# Patient Record
Sex: FEMALE | Race: WHITE | NOT HISPANIC OR LATINO | URBAN - METROPOLITAN AREA
[De-identification: names, ages, dates, MRNs, and addresses within clinical notes are randomized per-mention and may not be internally consistent; named-entity substitution may affect disease eponyms.]

---

## 2020-12-05 ENCOUNTER — INPATIENT (INPATIENT)
Facility: HOSPITAL | Age: 85
LOS: 5 days | Discharge: NURSING, REHAB OR LONG-TERM CARE | End: 2020-12-11
Attending: INTERNAL MEDICINE | Admitting: INTERNAL MEDICINE
Payer: MEDICARE

## 2020-12-05 VITALS
HEART RATE: 99 BPM | HEIGHT: 67 IN | SYSTOLIC BLOOD PRESSURE: 182 MMHG | WEIGHT: 199.96 LBS | OXYGEN SATURATION: 95 % | TEMPERATURE: 98 F | DIASTOLIC BLOOD PRESSURE: 129 MMHG | RESPIRATION RATE: 17 BRPM

## 2020-12-05 LAB
ALBUMIN SERPL ELPH-MCNC: 3.4 G/DL — SIGNIFICANT CHANGE UP (ref 3.3–5)
ALP SERPL-CCNC: 105 U/L — SIGNIFICANT CHANGE UP (ref 40–120)
ALT FLD-CCNC: 33 U/L — SIGNIFICANT CHANGE UP (ref 12–78)
ANION GAP SERPL CALC-SCNC: 8 MMOL/L — SIGNIFICANT CHANGE UP (ref 5–17)
APTT BLD: 39.6 SEC — HIGH (ref 27.5–35.5)
AST SERPL-CCNC: 54 U/L — HIGH (ref 15–37)
BASOPHILS # BLD AUTO: 0.03 K/UL — SIGNIFICANT CHANGE UP (ref 0–0.2)
BASOPHILS NFR BLD AUTO: 0.4 % — SIGNIFICANT CHANGE UP (ref 0–2)
BILIRUB SERPL-MCNC: 1.3 MG/DL — HIGH (ref 0.2–1.2)
BUN SERPL-MCNC: 16 MG/DL — SIGNIFICANT CHANGE UP (ref 7–23)
CALCIUM SERPL-MCNC: 10 MG/DL — SIGNIFICANT CHANGE UP (ref 8.5–10.1)
CHLORIDE SERPL-SCNC: 105 MMOL/L — SIGNIFICANT CHANGE UP (ref 96–108)
CK SERPL-CCNC: 125 U/L — SIGNIFICANT CHANGE UP (ref 26–192)
CO2 SERPL-SCNC: 24 MMOL/L — SIGNIFICANT CHANGE UP (ref 22–31)
CREAT SERPL-MCNC: 1.16 MG/DL — SIGNIFICANT CHANGE UP (ref 0.5–1.3)
EOSINOPHIL # BLD AUTO: 0.04 K/UL — SIGNIFICANT CHANGE UP (ref 0–0.5)
EOSINOPHIL NFR BLD AUTO: 0.5 % — SIGNIFICANT CHANGE UP (ref 0–6)
GLUCOSE SERPL-MCNC: 115 MG/DL — HIGH (ref 70–99)
HCT VFR BLD CALC: 52.6 % — HIGH (ref 34.5–45)
HGB BLD-MCNC: 17.8 G/DL — HIGH (ref 11.5–15.5)
IMM GRANULOCYTES NFR BLD AUTO: 0.4 % — SIGNIFICANT CHANGE UP (ref 0–1.5)
INR BLD: 1.75 RATIO — HIGH (ref 0.88–1.16)
LYMPHOCYTES # BLD AUTO: 1.33 K/UL — SIGNIFICANT CHANGE UP (ref 1–3.3)
LYMPHOCYTES # BLD AUTO: 17.7 % — SIGNIFICANT CHANGE UP (ref 13–44)
MCHC RBC-ENTMCNC: 30.6 PG — SIGNIFICANT CHANGE UP (ref 27–34)
MCHC RBC-ENTMCNC: 33.8 GM/DL — SIGNIFICANT CHANGE UP (ref 32–36)
MCV RBC AUTO: 90.4 FL — SIGNIFICANT CHANGE UP (ref 80–100)
MONOCYTES # BLD AUTO: 0.69 K/UL — SIGNIFICANT CHANGE UP (ref 0–0.9)
MONOCYTES NFR BLD AUTO: 9.2 % — SIGNIFICANT CHANGE UP (ref 2–14)
NEUTROPHILS # BLD AUTO: 5.38 K/UL — SIGNIFICANT CHANGE UP (ref 1.8–7.4)
NEUTROPHILS NFR BLD AUTO: 71.8 % — SIGNIFICANT CHANGE UP (ref 43–77)
NRBC # BLD: 0 /100 WBCS — SIGNIFICANT CHANGE UP (ref 0–0)
PLATELET # BLD AUTO: 257 K/UL — SIGNIFICANT CHANGE UP (ref 150–400)
POTASSIUM SERPL-MCNC: 4.5 MMOL/L — SIGNIFICANT CHANGE UP (ref 3.5–5.3)
POTASSIUM SERPL-SCNC: 4.5 MMOL/L — SIGNIFICANT CHANGE UP (ref 3.5–5.3)
PROT SERPL-MCNC: 9 GM/DL — HIGH (ref 6–8.3)
PROTHROM AB SERPL-ACNC: 19.8 SEC — HIGH (ref 10.6–13.6)
RBC # BLD: 5.82 M/UL — HIGH (ref 3.8–5.2)
RBC # FLD: 12.6 % — SIGNIFICANT CHANGE UP (ref 10.3–14.5)
SODIUM SERPL-SCNC: 137 MMOL/L — SIGNIFICANT CHANGE UP (ref 135–145)
WBC # BLD: 7.69 K/UL — SIGNIFICANT CHANGE UP (ref 3.8–10.5)
WBC # FLD AUTO: 7.69 K/UL — SIGNIFICANT CHANGE UP (ref 3.8–10.5)

## 2020-12-05 PROCEDURE — 93010 ELECTROCARDIOGRAM REPORT: CPT

## 2020-12-05 PROCEDURE — 70450 CT HEAD/BRAIN W/O DYE: CPT | Mod: 26,MA

## 2020-12-05 PROCEDURE — 99285 EMERGENCY DEPT VISIT HI MDM: CPT

## 2020-12-05 PROCEDURE — 72125 CT NECK SPINE W/O DYE: CPT | Mod: 26,MA

## 2020-12-05 PROCEDURE — 74176 CT ABD & PELVIS W/O CONTRAST: CPT | Mod: 26,MA

## 2020-12-05 PROCEDURE — 71250 CT THORAX DX C-: CPT | Mod: 26,MA

## 2020-12-05 RX ORDER — ACETAMINOPHEN 500 MG
650 TABLET ORAL ONCE
Refills: 0 | Status: COMPLETED | OUTPATIENT
Start: 2020-12-05 | End: 2020-12-05

## 2020-12-05 RX ORDER — SODIUM CHLORIDE 9 MG/ML
500 INJECTION, SOLUTION INTRAVENOUS ONCE
Refills: 0 | Status: COMPLETED | OUTPATIENT
Start: 2020-12-05 | End: 2020-12-05

## 2020-12-05 RX ADMIN — SODIUM CHLORIDE 500 MILLILITER(S): 9 INJECTION, SOLUTION INTRAVENOUS at 23:47

## 2020-12-05 NOTE — ED PROVIDER NOTE - PROGRESS NOTE DETAILS
Nohemi: pt signed out to me at 7 pm, compression fracture @ L2, able to move lower extremities, sensation intact, unable to properly assess rectal tone as patient not bearing down/coughing, retaining >200 cc urine, MR ordered r/o cauda equina. Spoke with son who states that patient does not have metal in body, no contraindications to MRI.

## 2020-12-05 NOTE — ED PROVIDER NOTE - NS ED MD DISPO ISOLATION TYPES
VTE: Coumadin dosed nightly, with INR daily checks, goal 2-3  GI PPx: PPI  DNR- Made DNR, family wants escalation of care, pressors if needed.  F: No IVF in setting of HD  E: Replete electrolytes with caution in setting of HD  N: Jevity 1.5 goal rate 62cc/hr, per updated nutrition recs 5/21  Dispo: SD to [facility], likely chronic vent facility + HD, pending SW placement. Dispo plans and SD plans discussed with daughter Cristal Airborne/Contact

## 2020-12-05 NOTE — ED ADULT TRIAGE NOTE - CHIEF COMPLAINT QUOTE
89 y/o female with PMH of cva 2017, HLD, afib, DEMMENTIA,. pt had a fall 2 days ago and has been bed ridden now c/c of lower back pain. pt on coumadin.

## 2020-12-05 NOTE — ED PROVIDER NOTE - CONSTITUTIONAL, MLM
normal... Well appearing, awake, alert, oriented to person, place, time/situation and in no apparent distress. Well appearing. A&Ox1.

## 2020-12-05 NOTE — ED ADULT NURSE NOTE - ED STAT RN HANDOFF DETAILS
Assuming patient's care for coverage. Report received from VELASQUEZ Mckenna and patient informed during rounding. Assessment available on KB. Will continue to monitor. on cardiac monitor at bedside. awaiting LR and urine.

## 2020-12-05 NOTE — ED PROVIDER NOTE - MUSCULOSKELETAL, MLM
Spine appears normal, range of motion is not limited, no muscle or joint tenderness Spine appears normal, range of motion is not limited. Mild pain to the top of back. No midline tenderness

## 2020-12-05 NOTE — ED PROVIDER NOTE - NS ED ATTENDING STATEMENT MOD
Broad differential however patient refuses care wants to be made comfort measures   Continue 02 for support and comfort   Attending Only

## 2020-12-05 NOTE — ED ADULT TRIAGE NOTE - TEMPERATURE IN FAHRENHEIT (DEGREES F)
[facility]  Progress Note     Alysia Larkin Patient Status:  Observation    10/7/1935 MRN [id number]   [facility] 3NE-A Attending Saw Barron MD   Hosp Day # 0 PCP Rebekah Duarte MD     Chief Complaint: confusion    S: Patient wi TROP <0.045            Imaging: Imaging data reviewed in Epic.     Medications:   • Heparin Sodium (Porcine)  5,000 Units Subcutaneous 2 times per day   • ceFAZolin  1 g Intravenous Q12H   • allopurinol  300 mg Oral Daily   • aspirin  81 mg Oral Daily   • 97.7

## 2020-12-05 NOTE — ED ADULT NURSE NOTE - CHIEF COMPLAINT QUOTE
89 y/o female with PMH of cva 2017, HLD, afib, DEMMENTIA,. pt had a fall 2 days ago and has been bed ridden now c/c of lower back pain. pt on coumadin.

## 2020-12-05 NOTE — ED PROVIDER NOTE - CLINICAL SUMMARY MEDICAL DECISION MAKING FREE TEXT BOX
Will access for traumatic injury with CT. Will access for AMS with labs and urine. Will require admission for failure to thrive.

## 2020-12-05 NOTE — ED PROVIDER NOTE - OBJECTIVE STATEMENT
89 y/o F with a PMHx of CVA and A-Fib (on coumadin) presents to the ED 89 y/o F with a PMHx of CVA and A-Fib (on coumadin) presents to the ED for evaluation. 2 weeks ago Patient fell and since then she has not been getting out of bed. Patient has AMS at baseline but has been more altered s/p fall.

## 2020-12-06 DIAGNOSIS — S32.020A WEDGE COMPRESSION FRACTURE OF SECOND LUMBAR VERTEBRA, INITIAL ENCOUNTER FOR CLOSED FRACTURE: ICD-10-CM

## 2020-12-06 DIAGNOSIS — N30.00 ACUTE CYSTITIS WITHOUT HEMATURIA: ICD-10-CM

## 2020-12-06 DIAGNOSIS — F03.90 UNSPECIFIED DEMENTIA, UNSPECIFIED SEVERITY, WITHOUT BEHAVIORAL DISTURBANCE, PSYCHOTIC DISTURBANCE, MOOD DISTURBANCE, AND ANXIETY: ICD-10-CM

## 2020-12-06 DIAGNOSIS — Z29.9 ENCOUNTER FOR PROPHYLACTIC MEASURES, UNSPECIFIED: ICD-10-CM

## 2020-12-06 DIAGNOSIS — I48.91 UNSPECIFIED ATRIAL FIBRILLATION: ICD-10-CM
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
## 2020-12-06 DIAGNOSIS — I10 ESSENTIAL (PRIMARY) HYPERTENSION: ICD-10-CM

## 2020-12-06 LAB
ANION GAP SERPL CALC-SCNC: 8 MMOL/L — SIGNIFICANT CHANGE UP (ref 5–17)
APPEARANCE UR: CLEAR — SIGNIFICANT CHANGE UP
BACTERIA # UR AUTO: ABNORMAL
BILIRUB UR-MCNC: NEGATIVE — SIGNIFICANT CHANGE UP
BUN SERPL-MCNC: 20 MG/DL — SIGNIFICANT CHANGE UP (ref 7–23)
CALCIUM SERPL-MCNC: 9.6 MG/DL — SIGNIFICANT CHANGE UP (ref 8.5–10.1)
CHLORIDE SERPL-SCNC: 103 MMOL/L — SIGNIFICANT CHANGE UP (ref 96–108)
CO2 SERPL-SCNC: 30 MMOL/L — SIGNIFICANT CHANGE UP (ref 22–31)
COLOR SPEC: YELLOW — SIGNIFICANT CHANGE UP
CREAT SERPL-MCNC: 1.3 MG/DL — SIGNIFICANT CHANGE UP (ref 0.5–1.3)
DIFF PNL FLD: ABNORMAL
EPI CELLS # UR: ABNORMAL
FLUAV AG NPH QL: SIGNIFICANT CHANGE UP COUNTS
FLUBV AG NPH QL: SIGNIFICANT CHANGE UP COUNTS
GLUCOSE SERPL-MCNC: 106 MG/DL — HIGH (ref 70–99)
GLUCOSE UR QL: NEGATIVE MG/DL — SIGNIFICANT CHANGE UP
HCT VFR BLD CALC: 50.3 % — HIGH (ref 34.5–45)
HGB BLD-MCNC: 17.3 G/DL — HIGH (ref 11.5–15.5)
INR BLD: 1.81 RATIO — HIGH (ref 0.88–1.16)
KETONES UR-MCNC: NEGATIVE — SIGNIFICANT CHANGE UP
LEUKOCYTE ESTERASE UR-ACNC: ABNORMAL
MCHC RBC-ENTMCNC: 30.8 PG — SIGNIFICANT CHANGE UP (ref 27–34)
MCHC RBC-ENTMCNC: 34.4 GM/DL — SIGNIFICANT CHANGE UP (ref 32–36)
MCV RBC AUTO: 89.5 FL — SIGNIFICANT CHANGE UP (ref 80–100)
NITRITE UR-MCNC: NEGATIVE — SIGNIFICANT CHANGE UP
NRBC # BLD: 0 /100 WBCS — SIGNIFICANT CHANGE UP (ref 0–0)
PH UR: 7 — SIGNIFICANT CHANGE UP (ref 5–8)
PLATELET # BLD AUTO: 259 K/UL — SIGNIFICANT CHANGE UP (ref 150–400)
POTASSIUM SERPL-MCNC: 3.5 MMOL/L — SIGNIFICANT CHANGE UP (ref 3.5–5.3)
POTASSIUM SERPL-SCNC: 3.5 MMOL/L — SIGNIFICANT CHANGE UP (ref 3.5–5.3)
PROT UR-MCNC: 500 MG/DL
PROTHROM AB SERPL-ACNC: 20.4 SEC — HIGH (ref 10.6–13.6)
RBC # BLD: 5.62 M/UL — HIGH (ref 3.8–5.2)
RBC # FLD: 12.6 % — SIGNIFICANT CHANGE UP (ref 10.3–14.5)
RBC CASTS # UR COMP ASSIST: ABNORMAL /HPF (ref 0–4)
RSV RNA NPH QL NAA+NON-PROBE: SIGNIFICANT CHANGE UP COUNTS
SARS-COV-2 IGG SERPL QL IA: NEGATIVE — SIGNIFICANT CHANGE UP
SARS-COV-2 IGM SERPL IA-ACNC: 0.07 INDEX — SIGNIFICANT CHANGE UP
SARS-COV-2 RNA SPEC QL NAA+PROBE: SIGNIFICANT CHANGE UP COUNTS
SODIUM SERPL-SCNC: 141 MMOL/L — SIGNIFICANT CHANGE UP (ref 135–145)
SP GR SPEC: 1.01 — SIGNIFICANT CHANGE UP (ref 1.01–1.02)
UROBILINOGEN FLD QL: NEGATIVE MG/DL — SIGNIFICANT CHANGE UP
WBC # BLD: 9.44 K/UL — SIGNIFICANT CHANGE UP (ref 3.8–10.5)
WBC # FLD AUTO: 9.44 K/UL — SIGNIFICANT CHANGE UP (ref 3.8–10.5)
WBC UR QL: ABNORMAL

## 2020-12-06 PROCEDURE — 12345: CPT | Mod: NC

## 2020-12-06 PROCEDURE — G1004: CPT

## 2020-12-06 PROCEDURE — 76775 US EXAM ABDO BACK WALL LIM: CPT | Mod: 26

## 2020-12-06 PROCEDURE — 99223 1ST HOSP IP/OBS HIGH 75: CPT | Mod: AI

## 2020-12-06 PROCEDURE — 72148 MRI LUMBAR SPINE W/O DYE: CPT | Mod: 26,ME

## 2020-12-06 RX ORDER — CEFTRIAXONE 500 MG/1
1000 INJECTION, POWDER, FOR SOLUTION INTRAMUSCULAR; INTRAVENOUS ONCE
Refills: 0 | Status: COMPLETED | OUTPATIENT
Start: 2020-12-06 | End: 2020-12-06

## 2020-12-06 RX ORDER — INFLUENZA VIRUS VACCINE 15; 15; 15; 15 UG/.5ML; UG/.5ML; UG/.5ML; UG/.5ML
0.5 SUSPENSION INTRAMUSCULAR ONCE
Refills: 0 | Status: DISCONTINUED | OUTPATIENT
Start: 2020-12-06 | End: 2020-12-11

## 2020-12-06 RX ORDER — CEFTRIAXONE 500 MG/1
1000 INJECTION, POWDER, FOR SOLUTION INTRAMUSCULAR; INTRAVENOUS EVERY 24 HOURS
Refills: 0 | Status: DISCONTINUED | OUTPATIENT
Start: 2020-12-06 | End: 2020-12-07

## 2020-12-06 RX ORDER — WARFARIN SODIUM 2.5 MG/1
3 TABLET ORAL ONCE
Refills: 0 | Status: COMPLETED | OUTPATIENT
Start: 2020-12-06 | End: 2020-12-06

## 2020-12-06 RX ORDER — METOPROLOL TARTRATE 50 MG
25 TABLET ORAL
Refills: 0 | Status: DISCONTINUED | OUTPATIENT
Start: 2020-12-06 | End: 2020-12-11

## 2020-12-06 RX ORDER — ENOXAPARIN SODIUM 100 MG/ML
40 INJECTION SUBCUTANEOUS DAILY
Refills: 0 | Status: DISCONTINUED | OUTPATIENT
Start: 2020-12-06 | End: 2020-12-06

## 2020-12-06 RX ADMIN — SODIUM CHLORIDE 500 MILLILITER(S): 9 INJECTION, SOLUTION INTRAVENOUS at 03:33

## 2020-12-06 RX ADMIN — WARFARIN SODIUM 3 MILLIGRAM(S): 2.5 TABLET ORAL at 21:35

## 2020-12-06 RX ADMIN — Medication 650 MILLIGRAM(S): at 03:33

## 2020-12-06 RX ADMIN — Medication 25 MILLIGRAM(S): at 17:32

## 2020-12-06 RX ADMIN — CEFTRIAXONE 100 MILLIGRAM(S): 500 INJECTION, POWDER, FOR SOLUTION INTRAMUSCULAR; INTRAVENOUS at 02:59

## 2020-12-06 RX ADMIN — CEFTRIAXONE 1000 MILLIGRAM(S): 500 INJECTION, POWDER, FOR SOLUTION INTRAMUSCULAR; INTRAVENOUS at 03:33

## 2020-12-06 RX ADMIN — ENOXAPARIN SODIUM 40 MILLIGRAM(S): 100 INJECTION SUBCUTANEOUS at 11:41

## 2020-12-06 RX ADMIN — Medication 650 MILLIGRAM(S): at 00:04

## 2020-12-06 NOTE — ED ADULT NURSE REASSESSMENT NOTE - NS ED NURSE REASSESS COMMENT FT1
Dr. Song aware of pt's vitals and still wants fluids to be administered as ordered.
Patient remains stable while in the ED, admitted to medicine pending ebd availability. VS wdl. Report handoff to VELASQUEZ Faith of 1D. Transported to unit in stable condition.
Received a 89F from VELASQUEZ Crouch, patient awake, with h/o CVA, HLD, Afib, on coumadin, dementia, bibems from home with c/o AMS, unable to ambulate s/p fall  2 days ago and also c/o back pain. BP elevated, MD aware, no stat orders noted. Sent to CT scan for evaluation.

## 2020-12-06 NOTE — CHART NOTE - NSCHARTNOTEFT_GEN_A_CORE
seen and examined  spoke to son, he is unsure of medications patient is on, gave me pharmacy , pharmacy has no record of patient  pt eval/ortho    Vital Signs Last 24 Hrs  T(C): 36.6 (06 Dec 2020 06:10), Max: 37.2 (05 Dec 2020 23:33)  T(F): 97.9 (06 Dec 2020 06:10), Max: 98.9 (05 Dec 2020 23:33)  HR: 75 (06 Dec 2020 06:10) (71 - 99)  BP: 153/90 (06 Dec 2020 06:10) (153/90 - 189/98)  BP(mean): --  RR: 18 (06 Dec 2020 06:10) (16 - 18)  SpO2: 94% (06 Dec 2020 06:10) (92% - 98%)                          17.8   7.69  )-----------( 257      ( 05 Dec 2020 18:51 )             52.6     12-05    137  |  105  |  16  ----------------------------<  115<H>  4.5   |  24  |  1.16    Ca    10.0      05 Dec 2020 18:51    TPro  9.0<H>  /  Alb  3.4  /  TBili  1.3<H>  /  DBili  x   /  AST  54<H>  /  ALT  33  /  AlkPhos  105  12-05    PT/INR - ( 05 Dec 2020 18:51 )   PT: 19.8 sec;   INR: 1.75 ratio         PTT - ( 05 Dec 2020 18:51 )  PTT:39.6 sec  Urinalysis Basic - ( 06 Dec 2020 00:27 )    Color: Yellow / Appearance: Clear / S.015 / pH: x  Gluc: x / Ketone: Negative  / Bili: Negative / Urobili: Negative mg/dL   Blood: x / Protein: 500 mg/dL / Nitrite: Negative   Leuk Esterase: Moderate / RBC: 3-5 /HPF / WBC 26-50   Sq Epi: x / Non Sq Epi: Moderate / Bacteria: Many

## 2020-12-06 NOTE — H&P ADULT - ASSESSMENT
pt w/dementia, htn, afib on coumadin and cva comes w/increased confusion after fall w/age indeterminate t11,12, l2 compression fx and uti

## 2020-12-06 NOTE — H&P ADULT - HISTORY OF PRESENT ILLNESS
Pt is a 89 y/o female w/pmhx of dementia, htn, afib on coumadin and cva comes after fall 2 days ago and since has been more confused. no reported fever, chills, sob , cp, palpitations, n/v/d/c no travel or sick contacts.  in ed found to have uti and multiple compression fractures at t11, t12 and l2. Pt is a 89 y/o female w/pmhx of dementia, htn, afib on coumadin and cva comes after fall 2 days ago and since has been more confused. no reported fever, chills, sob , cp, palpitations, n/v/d/c no travel or sick contacts.  in ed found to have uti and multiple compression fractures at t11, t12 and l2.  unable to get med list from family and not available on surescripts, check in am.

## 2020-12-06 NOTE — H&P ADULT - NSHPLABSRESULTS_GEN_ALL_CORE
LABS:                        17.8   7.69  )-----------( 257      ( 05 Dec 2020 18:51 )             52.6     12-    137  |  105  |  16  ----------------------------<  115<H>  4.5   |  24  |  1.16    Ca    10.0      05 Dec 2020 18:51    TPro  9.0<H>  /  Alb  3.4  /  TBili  1.3<H>  /  DBili  x   /  AST  54<H>  /  ALT  33  /  AlkPhos  105  12-05    PT/INR - ( 05 Dec 2020 18:51 )   PT: 19.8 sec;   INR: 1.75 ratio         PTT - ( 05 Dec 2020 18:51 )  PTT:39.6 sec  Urinalysis Basic - ( 06 Dec 2020 00:27 )    Color: Yellow / Appearance: Clear / S.015 / pH: x  Gluc: x / Ketone: Negative  / Bili: Negative / Urobili: Negative mg/dL   Blood: x / Protein: 500 mg/dL / Nitrite: Negative   Leuk Esterase: Moderate / RBC: 3-5 /HPF / WBC 26-50   Sq Epi: x / Non Sq Epi: Moderate / Bacteria: Many      CAPILLARY BLOOD GLUCOSE            RADIOLOGY & ADDITIONAL TESTS:    Imaging Personally Reviewed:  [ X] YES  [ ] NO

## 2020-12-06 NOTE — H&P ADULT - NSHPPHYSICALEXAM_GEN_ALL_CORE
Vital Signs Last 24 Hrs  T(C): 37.2 (05 Dec 2020 23:33), Max: 37.2 (05 Dec 2020 23:33)  T(F): 98.9 (05 Dec 2020 23:33), Max: 98.9 (05 Dec 2020 23:33)  HR: 71 (06 Dec 2020 02:59) (71 - 99)  BP: 158/101 (06 Dec 2020 02:59) (158/101 - 182/129)  BP(mean): --  RR: 16 (06 Dec 2020 02:59) (16 - 18)  SpO2: 94% (06 Dec 2020 02:59) (92% - 95%)    PHYSICAL EXAM:    GENERAL: NAD, well-groomed, elderly female  HEAD:  Atraumatic, Normocephalic  EYES: EOMI, PERRLA, conjunctiva and sclera clear  ENMT: No tonsillar erythema, exudates, or enlargement; Moist mucous membranes, No lesions  NECK: Supple, No JVD, Normal thyroid  NERVOUS SYSTEM:  Alert & Oriented X1, unable to cooperate but able to move all limbs  CHEST/LUNG: Clear to percussion bilaterally; No rales, rhonchi, wheezing, or rubs  HEART: Regular rate and rhythm; No rubs, or gallops, +S1,S2  ABDOMEN: Soft, Nontender, Nondistended; Bowel sounds present  EXTREMITIES:  2+ Peripheral Pulses, No clubbing, cyanosis, or edema  LYMPH: No cervical adenopathy  RECTAL: deferred  BREAST: deferred  : deferred  SKIN: No rashes or lesions    IMPROVE VTE Individual Risk Assessment        RISK                                                          Points  [  ] Previous VTE                                                3  [  ] Thrombophilia                                             2  [  ] Lower limb paralysis                                    2        (unable to hold up >15 seconds)    [  ] Current Cancer                                             2         (within 6 months)  [ x ] Immobilization > 24 hrs                              1  [  ] ICU/CCU stay > 24 hours                            1  [ x ] Age > 60                                                    1  IMPROVE VTE Score _____2____

## 2020-12-07 LAB
CULTURE RESULTS: SIGNIFICANT CHANGE UP
INR BLD: 1.9 RATIO — HIGH (ref 0.88–1.16)
PROTHROM AB SERPL-ACNC: 21.4 SEC — HIGH (ref 10.6–13.6)
SPECIMEN SOURCE: SIGNIFICANT CHANGE UP

## 2020-12-07 PROCEDURE — 99232 SBSQ HOSP IP/OBS MODERATE 35: CPT

## 2020-12-07 RX ORDER — WARFARIN SODIUM 2.5 MG/1
3 TABLET ORAL ONCE
Refills: 0 | Status: COMPLETED | OUTPATIENT
Start: 2020-12-07 | End: 2020-12-07

## 2020-12-07 RX ADMIN — Medication 25 MILLIGRAM(S): at 17:20

## 2020-12-07 RX ADMIN — WARFARIN SODIUM 3 MILLIGRAM(S): 2.5 TABLET ORAL at 22:17

## 2020-12-07 RX ADMIN — CEFTRIAXONE 100 MILLIGRAM(S): 500 INJECTION, POWDER, FOR SOLUTION INTRAMUSCULAR; INTRAVENOUS at 04:18

## 2020-12-07 RX ADMIN — Medication 25 MILLIGRAM(S): at 05:58

## 2020-12-07 NOTE — PROGRESS NOTE ADULT - SUBJECTIVE AND OBJECTIVE BOX
Patient is a 89y old  Female who presents with a chief complaint of fall (06 Dec 2020 04:21)      INTERVAL HPI/OVERNIGHT EVENTS: pt is a poor hx. reports feeling fine     MEDICATIONS  (STANDING):  cefTRIAXone   IVPB 1000 milliGRAM(s) IV Intermittent every 24 hours  influenza   Vaccine 0.5 milliLiter(s) IntraMuscular once  metoprolol tartrate 25 milliGRAM(s) Oral two times a day    MEDICATIONS  (PRN):      Allergies    No Known Allergies    Intolerances        Vital Signs Last 24 Hrs  T(C): 36.5 (07 Dec 2020 10:48), Max: 37.2 (07 Dec 2020 05:29)  T(F): 97.7 (07 Dec 2020 10:48), Max: 98.9 (07 Dec 2020 05:29)  HR: 70 (07 Dec 2020 14:20) (70 - 88)  BP: 141/84 (07 Dec 2020 14:20) (124/92 - 173/98)  BP(mean): --  RR: 18 (07 Dec 2020 14:20) (16 - 18)  SpO2: 95% (07 Dec 2020 14:20) (95% - 98%)    PHYSICAL EXAM:  GENERAL: NAD, well-groomed, well-developed  HEAD:  Atraumatic, Normocephalic  EYES: EOMI, PERRLA, conjunctiva and sclera clear  ENMT: No tonsillar erythema, exudates, or enlargement; Moist mucous membranes, Good dentition, No lesions  NECK: Supple, No JVD, Normal thyroid  NERVOUS SYSTEM:  Alert & Oriented X3, Good concentration; Motor Strength 5/5 B/L upper and lower extremities; DTRs 2+ intact and symmetric  CHEST/LUNG: Clear to percussion bilaterally; No rales, rhonchi, wheezing, or rubs  HEART: Regular rate and rhythm; No murmurs, rubs, or gallops  ABDOMEN: Soft, Nontender, Nondistended; Bowel sounds present  EXTREMITIES:  2+ Peripheral Pulses, No clubbing, cyanosis, or edema  LYMPH: No lymphadenopathy noted  SKIN: No rashes or lesions    LABS:                        17.3   9.44  )-----------( 259      ( 06 Dec 2020 12:42 )             50.3     12-06    141  |  103  |  20  ----------------------------<  106<H>  3.5   |  30  |  1.30    Ca    9.6      06 Dec 2020 12:42    TPro  9.0<H>  /  Alb  3.4  /  TBili  1.3<H>  /  DBili  x   /  AST  54<H>  /  ALT  33  /  AlkPhos  105  12-05    PT/INR - ( 07 Dec 2020 06:28 )   PT: 21.4 sec;   INR: 1.90 ratio         PTT - ( 05 Dec 2020 18:51 )  PTT:39.6 sec  Urinalysis Basic - ( 06 Dec 2020 00:27 )    Color: Yellow / Appearance: Clear / S.015 / pH: x  Gluc: x / Ketone: Negative  / Bili: Negative / Urobili: Negative mg/dL   Blood: x / Protein: 500 mg/dL / Nitrite: Negative   Leuk Esterase: Moderate / RBC: 3-5 /HPF / WBC 26-50   Sq Epi: x / Non Sq Epi: Moderate / Bacteria: Many      CAPILLARY BLOOD GLUCOSE          RADIOLOGY & ADDITIONAL TESTS:    Imaging Personally Reviewed:  [ ] YES  [ ] NO    Consultant(s) Notes Reviewed:  [ ] YES  [ ] NO    Care Discussed with Consultants/Other Providers [ ] YES  [ ] NO Patient is a 89y old  Female who presents with a chief complaint of fall (06 Dec 2020 04:21)      INTERVAL HPI/OVERNIGHT EVENTS: pt is a poor hx. reports feeling fine     MEDICATIONS  (STANDING):  cefTRIAXone   IVPB 1000 milliGRAM(s) IV Intermittent every 24 hours  influenza   Vaccine 0.5 milliLiter(s) IntraMuscular once  metoprolol tartrate 25 milliGRAM(s) Oral two times a day    MEDICATIONS  (PRN):      Allergies    No Known Allergies    Intolerances        Vital Signs Last 24 Hrs  T(C): 36.5 (07 Dec 2020 10:48), Max: 37.2 (07 Dec 2020 05:29)  T(F): 97.7 (07 Dec 2020 10:48), Max: 98.9 (07 Dec 2020 05:29)  HR: 70 (07 Dec 2020 14:20) (70 - 88)  BP: 141/84 (07 Dec 2020 14:20) (124/92 - 173/98)  BP(mean): --  RR: 18 (07 Dec 2020 14:20) (16 - 18)  SpO2: 95% (07 Dec 2020 14:20) (95% - 98%)    PHYSICAL EXAM:  GENERAL: NAD, well-groomed, well-developed  HEAD:  Atraumatic, Normocephalic  EYES: EOMI, PERRLA, conjunctiva and sclera clear  ENMT: No tonsillar erythema, exudates, or enlargement; Moist mucous membranes, Good dentition, No lesions  NECK: Supple, No JVD, Normal thyroid  NERVOUS SYSTEM:  Alert & Oriented X1, FROM x4   CHEST/LUNG: Clear to percussion bilaterally; No rales, rhonchi, wheezing, or rubs  HEART: Regular rate and rhythm; No murmurs, rubs, or gallops  ABDOMEN: Soft, Nontender, Nondistended; Bowel sounds present  EXTREMITIES:  2+ Peripheral Pulses, No clubbing, cyanosis, or edema  LYMPH: No lymphadenopathy noted  SKIN: No rashes or lesions    LABS:                        17.3   9.44  )-----------( 259      ( 06 Dec 2020 12:42 )             50.3     12-    141  |  103  |  20  ----------------------------<  106<H>  3.5   |  30  |  1.30    Ca    9.6      06 Dec 2020 12:42    TPro  9.0<H>  /  Alb  3.4  /  TBili  1.3<H>  /  DBili  x   /  AST  54<H>  /  ALT  33  /  AlkPhos  105  12-05    PT/INR - ( 07 Dec 2020 06:28 )   PT: 21.4 sec;   INR: 1.90 ratio         PTT - ( 05 Dec 2020 18:51 )  PTT:39.6 sec  Urinalysis Basic - ( 06 Dec 2020 00:27 )    Color: Yellow / Appearance: Clear / S.015 / pH: x  Gluc: x / Ketone: Negative  / Bili: Negative / Urobili: Negative mg/dL   Blood: x / Protein: 500 mg/dL / Nitrite: Negative   Leuk Esterase: Moderate / RBC: 3-5 /HPF / WBC 26-50   Sq Epi: x / Non Sq Epi: Moderate / Bacteria: Many      CAPILLARY BLOOD GLUCOSE          RADIOLOGY & ADDITIONAL TESTS:    Imaging Personally Reviewed:  [x ] YES  [ ] NO    Consultant(s) Notes Reviewed:  [ x] YES  [ ] NO    Care Discussed with Consultants/Other Providers [ ] YES  [ ] NO

## 2020-12-07 NOTE — PROGRESS NOTE ADULT - PROBLEM SELECTOR PLAN 5
dc abx as urine cx negative and is afebrile   renal lesion - Apparent 2.1 cm complex cyst in the upper pole of the right kidney as described above. If clinically indicated, abdominal MR without and with IV contrast may be pursued for further evaluation. can be done as an outpatient

## 2020-12-07 NOTE — PHYSICAL THERAPY INITIAL EVALUATION ADULT - GENERAL OBSERVATIONS, REHAB EVAL
Pt encountered in R sidelying, davila in place, alert, unable to assess orientation, NAD. Pt with (+) age indeterminate compression deformity T11, T12, L2. CT head and cervical spine (-) for acute changes.

## 2020-12-07 NOTE — PHYSICAL THERAPY INITIAL EVALUATION ADULT - PERTINENT HX OF CURRENT PROBLEM, REHAB EVAL
Pt is a 89 y/o female w/pmhx of dementia, htn, afib on coumadin and cva comes after fall 2 days ago and since has been more confused. no reported fever, chills, sob , cp, palpitations, n/v/d/c no travel or sick contacts.  in ed found to have uti and multiple compression fractures at t11, t12 and l2.

## 2020-12-07 NOTE — PROGRESS NOTE ADULT - PROBLEM SELECTOR PLAN 3
continue ac for now but will likely dc as she is a fall risk. reached out to family but no answer.   carole

## 2020-12-07 NOTE — PHYSICAL THERAPY INITIAL EVALUATION ADULT - ADDITIONAL COMMENTS
Pt spoke to pt son Denis via phone regarding pt previous level of function and home environment. Pt lives in a private house, there are steps but pt does not negotiate them. Pt has a cane and rolling walker, does not use them. Pt uses glasses, no home health aide services, recent fall. Son not able to recall if pt has had other falls. Pt needs assistance with showering, was able to walk short distances in the home without assistance prior to hospitalization.

## 2020-12-07 NOTE — PHYSICAL THERAPY INITIAL EVALUATION ADULT - TRANSFER TRAINING, PT EVAL
pt will be able to perform sit to stand transfers with stand by assistance with appropriate device x8 weeks

## 2020-12-07 NOTE — PROGRESS NOTE ADULT - PROBLEM SELECTOR PLAN 1
1. Mild to moderate acute benign appearing compression fracture of T12. Chronic compression deformity of L2 and T11.  2. Underlying disc disease and degenerative changes with mild stenosis at L4-5. No tight stenosis suggested.  analgesia

## 2020-12-08 DIAGNOSIS — R33.9 RETENTION OF URINE, UNSPECIFIED: ICD-10-CM

## 2020-12-08 DIAGNOSIS — I50.22 CHRONIC SYSTOLIC (CONGESTIVE) HEART FAILURE: ICD-10-CM

## 2020-12-08 LAB
ANION GAP SERPL CALC-SCNC: 9 MMOL/L — SIGNIFICANT CHANGE UP (ref 5–17)
BUN SERPL-MCNC: 23 MG/DL — SIGNIFICANT CHANGE UP (ref 7–23)
CALCIUM SERPL-MCNC: 9.7 MG/DL — SIGNIFICANT CHANGE UP (ref 8.5–10.1)
CHLORIDE SERPL-SCNC: 105 MMOL/L — SIGNIFICANT CHANGE UP (ref 96–108)
CO2 SERPL-SCNC: 26 MMOL/L — SIGNIFICANT CHANGE UP (ref 22–31)
CREAT SERPL-MCNC: 1.13 MG/DL — SIGNIFICANT CHANGE UP (ref 0.5–1.3)
GLUCOSE SERPL-MCNC: 87 MG/DL — SIGNIFICANT CHANGE UP (ref 70–99)
HCT VFR BLD CALC: 50.1 % — HIGH (ref 34.5–45)
HGB BLD-MCNC: 16.6 G/DL — HIGH (ref 11.5–15.5)
INR BLD: 1.73 RATIO — HIGH (ref 0.88–1.16)
MCHC RBC-ENTMCNC: 30.6 PG — SIGNIFICANT CHANGE UP (ref 27–34)
MCHC RBC-ENTMCNC: 33.1 GM/DL — SIGNIFICANT CHANGE UP (ref 32–36)
MCV RBC AUTO: 92.3 FL — SIGNIFICANT CHANGE UP (ref 80–100)
NRBC # BLD: 0 /100 WBCS — SIGNIFICANT CHANGE UP (ref 0–0)
PLATELET # BLD AUTO: 249 K/UL — SIGNIFICANT CHANGE UP (ref 150–400)
POTASSIUM SERPL-MCNC: 3.2 MMOL/L — LOW (ref 3.5–5.3)
POTASSIUM SERPL-SCNC: 3.2 MMOL/L — LOW (ref 3.5–5.3)
PROTHROM AB SERPL-ACNC: 19.6 SEC — HIGH (ref 10.6–13.6)
RBC # BLD: 5.43 M/UL — HIGH (ref 3.8–5.2)
RBC # FLD: 12.7 % — SIGNIFICANT CHANGE UP (ref 10.3–14.5)
SODIUM SERPL-SCNC: 140 MMOL/L — SIGNIFICANT CHANGE UP (ref 135–145)
WBC # BLD: 8.8 K/UL — SIGNIFICANT CHANGE UP (ref 3.8–10.5)
WBC # FLD AUTO: 8.8 K/UL — SIGNIFICANT CHANGE UP (ref 3.8–10.5)

## 2020-12-08 PROCEDURE — 99232 SBSQ HOSP IP/OBS MODERATE 35: CPT

## 2020-12-08 RX ORDER — WARFARIN SODIUM 2.5 MG/1
5 TABLET ORAL ONCE
Refills: 0 | Status: COMPLETED | OUTPATIENT
Start: 2020-12-08 | End: 2020-12-08

## 2020-12-08 RX ORDER — ATORVASTATIN CALCIUM 80 MG/1
40 TABLET, FILM COATED ORAL AT BEDTIME
Refills: 0 | Status: DISCONTINUED | OUTPATIENT
Start: 2020-12-08 | End: 2020-12-11

## 2020-12-08 RX ORDER — POLYETHYLENE GLYCOL 3350 17 G/17G
17 POWDER, FOR SOLUTION ORAL DAILY
Refills: 0 | Status: DISCONTINUED | OUTPATIENT
Start: 2020-12-08 | End: 2020-12-11

## 2020-12-08 RX ORDER — ISOSORBIDE MONONITRATE 60 MG/1
60 TABLET, EXTENDED RELEASE ORAL DAILY
Refills: 0 | Status: DISCONTINUED | OUTPATIENT
Start: 2020-12-08 | End: 2020-12-11

## 2020-12-08 RX ORDER — POTASSIUM CHLORIDE 20 MEQ
40 PACKET (EA) ORAL ONCE
Refills: 0 | Status: COMPLETED | OUTPATIENT
Start: 2020-12-08 | End: 2020-12-08

## 2020-12-08 RX ORDER — DIGOXIN 250 MCG
0.12 TABLET ORAL DAILY
Refills: 0 | Status: DISCONTINUED | OUTPATIENT
Start: 2020-12-08 | End: 2020-12-11

## 2020-12-08 RX ORDER — LACTULOSE 10 G/15ML
20 SOLUTION ORAL ONCE
Refills: 0 | Status: COMPLETED | OUTPATIENT
Start: 2020-12-08 | End: 2020-12-08

## 2020-12-08 RX ORDER — AMLODIPINE BESYLATE 2.5 MG/1
5 TABLET ORAL DAILY
Refills: 0 | Status: DISCONTINUED | OUTPATIENT
Start: 2020-12-08 | End: 2020-12-08

## 2020-12-08 RX ORDER — ESCITALOPRAM OXALATE 10 MG/1
5 TABLET, FILM COATED ORAL DAILY
Refills: 0 | Status: DISCONTINUED | OUTPATIENT
Start: 2020-12-08 | End: 2020-12-11

## 2020-12-08 RX ORDER — DILTIAZEM HCL 120 MG
360 CAPSULE, EXT RELEASE 24 HR ORAL DAILY
Refills: 0 | Status: DISCONTINUED | OUTPATIENT
Start: 2020-12-08 | End: 2020-12-11

## 2020-12-08 RX ORDER — OXYCODONE AND ACETAMINOPHEN 5; 325 MG/1; MG/1
1 TABLET ORAL EVERY 6 HOURS
Refills: 0 | Status: DISCONTINUED | OUTPATIENT
Start: 2020-12-08 | End: 2020-12-11

## 2020-12-08 RX ORDER — CALCITONIN SALMON 200 [IU]/ML
1 INJECTION, SOLUTION INTRAMUSCULAR DAILY
Refills: 0 | Status: DISCONTINUED | OUTPATIENT
Start: 2020-12-08 | End: 2020-12-11

## 2020-12-08 RX ADMIN — OXYCODONE AND ACETAMINOPHEN 1 TABLET(S): 5; 325 TABLET ORAL at 14:23

## 2020-12-08 RX ADMIN — OXYCODONE AND ACETAMINOPHEN 1 TABLET(S): 5; 325 TABLET ORAL at 13:53

## 2020-12-08 RX ADMIN — Medication 40 MILLIEQUIVALENT(S): at 09:01

## 2020-12-08 RX ADMIN — ATORVASTATIN CALCIUM 40 MILLIGRAM(S): 80 TABLET, FILM COATED ORAL at 21:41

## 2020-12-08 RX ADMIN — AMLODIPINE BESYLATE 5 MILLIGRAM(S): 2.5 TABLET ORAL at 09:01

## 2020-12-08 RX ADMIN — LACTULOSE 20 GRAM(S): 10 SOLUTION ORAL at 13:55

## 2020-12-08 RX ADMIN — WARFARIN SODIUM 5 MILLIGRAM(S): 2.5 TABLET ORAL at 21:41

## 2020-12-08 RX ADMIN — Medication 25 MILLIGRAM(S): at 17:01

## 2020-12-08 RX ADMIN — Medication 25 MILLIGRAM(S): at 05:27

## 2020-12-08 NOTE — CONSULT NOTE ADULT - SUBJECTIVE AND OBJECTIVE BOX
89F w/ hx of dementia who was a/w UTI and low back pain several days ago for evaluation of low back pain. Patient is extremely poor historian due to baseline dementia but reports diffuse pain. ROS is unobtainable.     Vital Signs Last 24 Hrs  T(C): 36.9 (08 Dec 2020 17:37), Max: 36.9 (08 Dec 2020 17:37)  T(F): 98.5 (08 Dec 2020 17:37), Max: 98.5 (08 Dec 2020 17:37)  HR: 71 (08 Dec 2020 18:51) (68 - 81)  BP: 151/94 (08 Dec 2020 18:51) (135/71 - 155/98)  BP(mean): --  RR: 18 (08 Dec 2020 18:51) (18 - 18)  SpO2: 94% (08 Dec 2020 18:51) (92% - 95%)                          16.6   8.80  )-----------( 249      ( 08 Dec 2020 07:38 )             50.1       Exam:  General: Awake, alert, no acute distress  Spine:   Skin intact. No obvious abrasions or lesions.   No apparent C/T/L spine tenderness. No palpable deformity or stepoff. No hematoma appreciated.   Negative Saeed. Negative Babinski. Negative Clonus.   No apparent calf tenderness bilaterally  Motor/sensory exam difficult to obtain secondary to dementia; but patient spontaneously moving all extremities    Negative Log roll/axial load bilaterally. Ecchymosis over anterior left knee. No other apparent bony tenderness appreciated.     MRI imaging reviewed with evidence of T12 acute VCF, chronic VCFs T11 and L2

## 2020-12-08 NOTE — PROGRESS NOTE ADULT - PROBLEM SELECTOR PLAN 1
1. Mild to moderate acute benign appearing compression fracture of T12. Chronic compression deformity of L2 and T11.  2. Underlying disc disease and degenerative changes with mild stenosis at L4-5. No tight stenosis suggested.  pt still with significant pain. will get ortho consult. may need TLSO brace. add bisphosphonate?   add calcitonin nasal spray  analgesia

## 2020-12-08 NOTE — PROGRESS NOTE ADULT - PROBLEM SELECTOR PLAN 3
continue ac for now but will likely dc as she is a fall risk. reached out to family but no answer so could not do med rec. pharmacy given does not know the pt  bb  3mg 3mg 5mg continue ac for now but will likely dc as she is a fall risk. reached out to family but no answer so could not do med rec. finally got pharmacy and med rec done   pt on 5mg daily on average

## 2020-12-08 NOTE — CONSULT NOTE ADULT - ASSESSMENT
89F a/w UTI back pain with acute T12 VCF and chronic appearing T11/L2 VCFs    Plan:   Medical management appreciated  MRI Imaging reviewed w/ acute T12 VCF, chronic T11/L2 VCF  XR C/T/L spine ordered  XR L knee ordered to evaluate ecchymosis as patient poor historian so difficult to evaluate tenderness  TLSO brace ordered; To be worn only with ambulation - not while in bed  Recommend continue IN calcitonin for 2 weeks, then start bisphosphonate treatment  WBAT/PT  Pain control PRN  DVT per primary team  No signs of acute cord compression. No evidence of fever or infection that would suggest epidural abscess. No back/spinal hematoma appreciated on clinical exam.   No acute surgical orthopedic intervention indicated at this time. Ortho stable for discharge. Patient can follow up with spine attending Dr. Veras as outpatient after discharge from hospital/[facility] for further evaluation and treatment  Discussed with spine attending Dr. Veras who agrees with treatment plan

## 2020-12-08 NOTE — PROGRESS NOTE ADULT - SUBJECTIVE AND OBJECTIVE BOX
Patient is a 89y old  Female who presents with a chief complaint of fall (06 Dec 2020 04:21)      INTERVAL HPI/OVERNIGHT EVENTS: pt is a poor hx. reports feeling fine but is in pain with movement     MEDICATIONS  (STANDING):  amLODIPine   Tablet 5 milliGRAM(s) Oral daily  influenza   Vaccine 0.5 milliLiter(s) IntraMuscular once  metoprolol tartrate 25 milliGRAM(s) Oral two times a day  warfarin 5 milliGRAM(s) Oral once    MEDICATIONS  (PRN):  oxycodone    5 mG/acetaminophen 325 mG 1 Tablet(s) Oral every 6 hours PRN Severe Pain (7 - 10)  polyethylene glycol 3350 17 Gram(s) Oral daily PRN Constipation        Allergies    No Known Allergies    Intolerances      Vital Signs Last 24 Hrs  T(C): 36.6 (08 Dec 2020 14:33), Max: 36.7 (07 Dec 2020 23:39)  T(F): 97.8 (08 Dec 2020 14:33), Max: 98 (07 Dec 2020 23:39)  HR: 71 (08 Dec 2020 14:33) (68 - 80)  BP: 135/71 (08 Dec 2020 14:33) (135/71 - 155/98)  BP(mean): --  RR: 18 (08 Dec 2020 14:33) (18 - 18)  SpO2: 95% (08 Dec 2020 14:33) (92% - 96%)    PHYSICAL EXAM:  GENERAL: NAD, well-groomed, well-developed  HEAD:  Atraumatic, Normocephalic  EYES: EOMI, PERRLA, conjunctiva and sclera clear  ENMT: No tonsillar erythema, exudates, or enlargement; Moist mucous membranes, Good dentition, No lesions  NECK: Supple, No JVD, Normal thyroid  NERVOUS SYSTEM:  Alert & Oriented X1, FROM x4   CHEST/LUNG: Clear to percussion bilaterally; No rales, rhonchi, wheezing, or rubs  HEART: Regular rate and rhythm; No murmurs, rubs, or gallops  ABDOMEN: Soft, Nontender, Nondistended; Bowel sounds present  EXTREMITIES:  2+ Peripheral Pulses, No clubbing, cyanosis, or edema  LYMPH: No lymphadenopathy noted  SKIN: No rashes or lesions    LABS:                                   16.6   8.80  )-----------( 249      ( 08 Dec 2020 07:38 )             50.1     12-08    140  |  105  |  23  ----------------------------<  87  3.2<L>   |  26  |  1.13    Ca    9.7      08 Dec 2020 07:38      PT/INR - ( 08 Dec 2020 07:38 )   PT: 19.6 sec;   INR: 1.73 ratio               CAPILLARY BLOOD GLUCOSE          RADIOLOGY & ADDITIONAL TESTS:    Imaging Personally Reviewed:  [x ] YES  [ ] NO    Consultant(s) Notes Reviewed:  [ x] YES  [ ] NO    Care Discussed with Consultants/Other Providers [ ] YES  [ ] NO

## 2020-12-09 DIAGNOSIS — N28.1 CYST OF KIDNEY, ACQUIRED: ICD-10-CM

## 2020-12-09 LAB
ANION GAP SERPL CALC-SCNC: 7 MMOL/L — SIGNIFICANT CHANGE UP (ref 5–17)
BUN SERPL-MCNC: 18 MG/DL — SIGNIFICANT CHANGE UP (ref 7–23)
CALCIUM SERPL-MCNC: 9.5 MG/DL — SIGNIFICANT CHANGE UP (ref 8.5–10.1)
CHLORIDE SERPL-SCNC: 105 MMOL/L — SIGNIFICANT CHANGE UP (ref 96–108)
CO2 SERPL-SCNC: 27 MMOL/L — SIGNIFICANT CHANGE UP (ref 22–31)
CREAT SERPL-MCNC: 1.03 MG/DL — SIGNIFICANT CHANGE UP (ref 0.5–1.3)
DIGOXIN SERPL-MCNC: 0.62 NG/ML — LOW (ref 0.8–2)
GLUCOSE SERPL-MCNC: 98 MG/DL — SIGNIFICANT CHANGE UP (ref 70–99)
HCT VFR BLD CALC: 48.2 % — HIGH (ref 34.5–45)
HGB BLD-MCNC: 16.4 G/DL — HIGH (ref 11.5–15.5)
INR BLD: 1.74 RATIO — HIGH (ref 0.88–1.16)
MCHC RBC-ENTMCNC: 31.4 PG — SIGNIFICANT CHANGE UP (ref 27–34)
MCHC RBC-ENTMCNC: 34 GM/DL — SIGNIFICANT CHANGE UP (ref 32–36)
MCV RBC AUTO: 92.3 FL — SIGNIFICANT CHANGE UP (ref 80–100)
NRBC # BLD: 0 /100 WBCS — SIGNIFICANT CHANGE UP (ref 0–0)
PLATELET # BLD AUTO: 201 K/UL — SIGNIFICANT CHANGE UP (ref 150–400)
POTASSIUM SERPL-MCNC: 3.6 MMOL/L — SIGNIFICANT CHANGE UP (ref 3.5–5.3)
POTASSIUM SERPL-SCNC: 3.6 MMOL/L — SIGNIFICANT CHANGE UP (ref 3.5–5.3)
PROTHROM AB SERPL-ACNC: 19.7 SEC — HIGH (ref 10.6–13.6)
RBC # BLD: 5.22 M/UL — HIGH (ref 3.8–5.2)
RBC # FLD: 12.6 % — SIGNIFICANT CHANGE UP (ref 10.3–14.5)
SODIUM SERPL-SCNC: 139 MMOL/L — SIGNIFICANT CHANGE UP (ref 135–145)
WBC # BLD: 7.24 K/UL — SIGNIFICANT CHANGE UP (ref 3.8–10.5)
WBC # FLD AUTO: 7.24 K/UL — SIGNIFICANT CHANGE UP (ref 3.8–10.5)

## 2020-12-09 PROCEDURE — 72040 X-RAY EXAM NECK SPINE 2-3 VW: CPT | Mod: 26

## 2020-12-09 PROCEDURE — 73562 X-RAY EXAM OF KNEE 3: CPT | Mod: 26,LT

## 2020-12-09 PROCEDURE — 72110 X-RAY EXAM L-2 SPINE 4/>VWS: CPT | Mod: 26

## 2020-12-09 PROCEDURE — 72072 X-RAY EXAM THORAC SPINE 3VWS: CPT | Mod: 26

## 2020-12-09 PROCEDURE — 99233 SBSQ HOSP IP/OBS HIGH 50: CPT

## 2020-12-09 RX ORDER — DILTIAZEM HCL 120 MG
1 CAPSULE, EXT RELEASE 24 HR ORAL
Qty: 0 | Refills: 0 | DISCHARGE
Start: 2020-12-09

## 2020-12-09 RX ORDER — DIGOXIN 250 MCG
1 TABLET ORAL
Qty: 0 | Refills: 0 | DISCHARGE
Start: 2020-12-09

## 2020-12-09 RX ORDER — METOPROLOL TARTRATE 50 MG
1 TABLET ORAL
Qty: 0 | Refills: 0 | DISCHARGE
Start: 2020-12-09

## 2020-12-09 RX ORDER — POLYETHYLENE GLYCOL 3350 17 G/17G
17 POWDER, FOR SOLUTION ORAL
Qty: 0 | Refills: 0 | DISCHARGE
Start: 2020-12-09

## 2020-12-09 RX ORDER — ESCITALOPRAM OXALATE 10 MG/1
1 TABLET, FILM COATED ORAL
Qty: 0 | Refills: 0 | DISCHARGE
Start: 2020-12-09

## 2020-12-09 RX ORDER — ISOSORBIDE MONONITRATE 60 MG/1
1 TABLET, EXTENDED RELEASE ORAL
Qty: 0 | Refills: 0 | DISCHARGE
Start: 2020-12-09

## 2020-12-09 RX ORDER — CALCITONIN SALMON 200 [IU]/ML
1 INJECTION, SOLUTION INTRAMUSCULAR
Qty: 0 | Refills: 0 | DISCHARGE
Start: 2020-12-09 | End: 2020-12-15

## 2020-12-09 RX ORDER — ATORVASTATIN CALCIUM 80 MG/1
1 TABLET, FILM COATED ORAL
Qty: 0 | Refills: 0 | DISCHARGE
Start: 2020-12-09

## 2020-12-09 RX ADMIN — ATORVASTATIN CALCIUM 40 MILLIGRAM(S): 80 TABLET, FILM COATED ORAL at 21:53

## 2020-12-09 RX ADMIN — Medication 25 MILLIGRAM(S): at 05:40

## 2020-12-09 RX ADMIN — Medication 0.12 MILLIGRAM(S): at 05:40

## 2020-12-09 RX ADMIN — CALCITONIN SALMON 1 SPRAY(S): 200 INJECTION, SOLUTION INTRAMUSCULAR at 11:21

## 2020-12-09 RX ADMIN — Medication 360 MILLIGRAM(S): at 05:40

## 2020-12-09 RX ADMIN — ISOSORBIDE MONONITRATE 60 MILLIGRAM(S): 60 TABLET, EXTENDED RELEASE ORAL at 11:21

## 2020-12-09 RX ADMIN — ESCITALOPRAM OXALATE 5 MILLIGRAM(S): 10 TABLET, FILM COATED ORAL at 11:21

## 2020-12-09 NOTE — PROGRESS NOTE ADULT - ASSESSMENT
89 y.o. F w/ Hx dementia, htn, afib (on coumadin) and cva comes w/ increased confusion after fall; found to have age-indeterminate t11,12, l2 compression fx and uti.

## 2020-12-09 NOTE — PROGRESS NOTE ADULT - PROBLEM SELECTOR PLAN 3
Continue ac for now (Warfarin 5mg daily) however, if pt does not do well w/ rehab -- i.e. remains at risk for falls, then would need to re-assess

## 2020-12-09 NOTE — PROGRESS NOTE ADULT - PROBLEM SELECTOR PLAN 1
MRI of L-spine shows:   1. Mild to moderate acute benign appearing compression fracture of T12. Chronic compression deformity of L2 and T11.   2. Underlying disc disease and degenerative changes with mild stenosis at L4-5. No tight stenosis suggested.    Ortho consulted (Robbin).  No surgery recommended, but recommended a TLSO brace to be worn only when ambulating (not when in bed)  added calcitonin nasal spray for 2 week  After that should consider changing to a Bisphosphonate or other long-term treatment (endocrine or rheum consult recommended)  analgesia prn

## 2020-12-09 NOTE — PROGRESS NOTE ADULT - PROBLEM SELECTOR PLAN 10
renal lesion - Apparent 2.1 cm complex cyst in the upper pole of the right kidney. Abdominal MR without and with IV contrast may be pursued for further evaluation. can be done as an outpatient

## 2020-12-09 NOTE — CHART NOTE - NSCHARTNOTEFT_GEN_A_CORE
Order received from orthopedics to fit patient with TLSO for t11 and T12 Fx  pt measured and brace adjusted   Pt unable to sit or stand.  Brace left at bedside  RN instruted to sho  written instructions left at bedside  Follow up if needed      Clark [place]  [phone]

## 2020-12-09 NOTE — DISCHARGE NOTE PROVIDER - CARE PROVIDER_API CALL
Primary physician, After discharge from rehab  Phone: (   )    -  Fax: (   )    -  Follow Up Time:

## 2020-12-09 NOTE — PROGRESS NOTE ADULT - SUBJECTIVE AND OBJECTIVE BOX
Patient is a 89y old  Female who presents with a chief complaint of fall (08 Dec 2020 20:06)      INTERVAL HPI/OVERNIGHT EVENTS:    Comfortable in bed; no pain at present.    REVIEW OF SYSTEMS:   Remaining ROS negative    MEDICATIONS  (STANDING):  atorvastatin 40 milliGRAM(s) Oral at bedtime  calcitonin Nasal 1 Spray(s) Nasal daily  digoxin     Tablet 0.125 milliGRAM(s) Oral daily  diltiazem    milliGRAM(s) Oral daily  escitalopram 5 milliGRAM(s) Oral daily  influenza   Vaccine 0.5 milliLiter(s) IntraMuscular once  isosorbide   mononitrate ER Tablet (IMDUR) 60 milliGRAM(s) Oral daily  metoprolol tartrate 25 milliGRAM(s) Oral two times a day    MEDICATIONS  (PRN):  oxycodone    5 mG/acetaminophen 325 mG 1 Tablet(s) Oral every 6 hours PRN Severe Pain (7 - 10)  polyethylene glycol 3350 17 Gram(s) Oral daily PRN Constipation      Allergies    No Known Allergies    Intolerances        Vital Signs Last 24 Hrs  T(C): 36.8 (09 Dec 2020 11:11), Max: 36.9 (08 Dec 2020 17:37)  T(F): 98.3 (09 Dec 2020 11:11), Max: 98.5 (08 Dec 2020 17:37)  HR: 81 (09 Dec 2020 11:11) (70 - 81)  BP: 124/84 (09 Dec 2020 11:11) (124/84 - 154/94)  BP(mean): --  RR: 18 (09 Dec 2020 11:11) (18 - 18)  SpO2: 94% (09 Dec 2020 11:11) (92% - 95%)    PHYSICAL EXAM:  GENERAL: NAD  HEAD:  Atraumatic, Normocephalic  EYES: EOMI, PERRLA, conjunctiva and sclera clear  ENT: O/P Clear  NECK: Supple, No JVD  NERVOUS SYSTEM:  No focal deficits  CHEST/LUNG: Clear to percussion bilaterally; No rales, rhonchi, wheezing  HEART: Regular rate and rhythm; No murmurs, rubs, or gallops  ABDOMEN: Soft, Nontender, Nondistended; Bowel sounds present  EXTREMITIES:  2+ Peripheral Pulses, No clubbing, cyanosis, or edema  SKIN: No rashes or lesions    LABS:                        16.4   7.24  )-----------( 201      ( 09 Dec 2020 07:48 )             48.2     12-09    139  |  105  |  18  ----------------------------<  98  3.6   |  27  |  1.03    Ca    9.5      09 Dec 2020 07:48    TPro  x   /  Alb  x   /  TBili  0.9  /  DBili  x   /  AST  x   /  ALT  x   /  AlkPhos  x   12-09    PT/INR - ( 09 Dec 2020 07:48 )   PT: 19.7 sec;   INR: 1.74 ratio             CAPILLARY BLOOD GLUCOSE          RADIOLOGY & ADDITIONAL TESTS:    Imaging Personally Reviewed:  [ ] YES  [ ] NO    Consultant(s) Notes Reviewed:  [ ] YES  [ ] NO    Care Discussed with Consultants/Other Providers [ ] YES  [ ] NO

## 2020-12-09 NOTE — DISCHARGE NOTE PROVIDER - NSDCCPCAREPLAN_GEN_ALL_CORE_FT
PRINCIPAL DISCHARGE DIAGNOSIS  Diagnosis: Compression fracture of L2 vertebra  Assessment and Plan of Treatment:       SECONDARY DISCHARGE DIAGNOSES  Diagnosis: Fall  Assessment and Plan of Treatment:

## 2020-12-09 NOTE — DISCHARGE NOTE PROVIDER - PROVIDER TOKENS
FREE:[LAST:[Primary physician],FIRST:[After discharge from rehab],PHONE:[(   )    -],FAX:[(   )    -]]

## 2020-12-09 NOTE — DISCHARGE NOTE PROVIDER - HOSPITAL COURSE
89 y.o. F w/ Hx dementia, htn, afib (on coumadin) and cva comes w/ increased confusion after fall; found to have age-indeterminate t11,12, l2 compression fx and uti.     Problem/Plan - 1:  ·  Problem: Compression fracture of L2 vertebra, initial encounter.  Plan: MRI of L-spine shows:   1. Mild to moderate acute benign appearing compression fracture of T12. Chronic compression deformity of L2 and T11.   2. Underlying disc disease and degenerative changes with mild stenosis at L4-5. No tight stenosis suggested.    Ortho consulted (Robbin).  No surgery recommended, but recommended a TLSO brace to be worn only when ambulating (not when in bed)  added calcitonin nasal spray for 2 week  After that should consider changing to a Bisphosphonate or other long-term treatment (endocrine or rheum consult recommended)  analgesia prn.      Problem/Plan - 2:  ·  Problem: Fall, initial encounter.  Plan: as above  Needs Rehab for PT.      Problem/Plan - 3:  ·  Problem: Atrial fibrillation, unspecified type.  Plan: Continue ac for now (Warfarin 5mg daily) however, if pt does not do well w/ rehab -- i.e. remains at risk for falls, then would need to re-assess.    recheck INR on 12/12/2020, then prn     Problem/Plan - 4:  ·  Problem: Dementia without behavioral disturbance, unspecified dementia type.  Plan: supportive care.      Problem/Plan - 5:  ·  Problem: Acute cystitis without hematuria.  Plan: dc abx as urine cx negative and is afebrile.      Problem/Plan - 6:  Problem: Essential hypertension. Plan: restart home meds.     Problem/Plan - 8:  ·  Problem: Urinary retention.  Plan: davila placed in the ed.   TOV today.      Problem/Plan - 9:  ·  Problem: Chronic systolic congestive heart failure.  Plan: Stable  restarted home meds   imdur, cardizem, digoxin, lipitor.      Problem/Plan - 10:  Problem: Renal cyst. Plan; renal lesion - Apparent 2.1 cm complex cyst in the upper pole of the right kidney. Abdominal MR without and with IV contrast may be pursued for further evaluation. can be done as an outpatient.     89 y.o. F w/ Hx dementia, htn, afib (on coumadin) and cva comes w/ increased confusion after fall; found to have age-indeterminate t11,12, l2 compression fx and uti.     Problem/Plan - 1:  ·  Problem: Pathological compression fracture of L2 vertebra, initial encounter.  - The recent fall may have helped precipitate an event, however the multitude of fractures supports Osteoporosis as the underlying cause   MRI of L-spine shows:   1. Mild to moderate acute benign appearing compression fracture of T12. Chronic compression deformity of L2 and T11.   2. Underlying disc disease and degenerative changes with mild stenosis at L4-5. No tight stenosis suggested.    Ortho consulted (Robbin).  No surgery recommended, but recommended a TLSO brace to be worn only when ambulating (not when in bed)  Treat osteoporosis as below  analgesia prn.      Problem/Plan - 2:  ·  Problem: Fall, initial encounter.  Plan: as above     Problem/Plan - 3:  ·  Problem: Senile osteoporosis.  - added calcitonin nasal spray for 2 week  - After that should consider changing to a Bisphosphonate or other long-term treatment (endocrine or rheum consult recommended)     Problem/Plan - 4:  ·  Problem: Atrial fibrillation, unspecified type.  Plan: Continue ac for now (Warfarin 5mg daily) however, if pt does not do well w/ rehab -- i.e. remains at risk for falls, then would need to re-assess.    recheck INR on 12/12/2020, then prn     Problem/Plan - 5:  ·  Problem: Dementia without behavioral disturbance, unspecified dementia type.  Plan: supportive care.      Problem/Plan - 6:  ·  Problem: Acute cystitis without hematuria.  Plan: dc abx as urine cx negative and is afebrile.      Problem/Plan - 7:  Problem: Essential hypertension. Plan: restart home meds.     Problem/Plan - 8:  ·  Problem: Urinary retention.  Plan: davila placed in the ed.   TOV today.      Problem/Plan - 9:  ·  Problem: Chronic systolic congestive heart failure.  Plan: Stable  restarted home meds   imdur, cardizem, digoxin, lipitor.      Problem/Plan - 10:  Problem: Renal cyst. Plan; renal lesion - Apparent 2.1 cm complex cyst in the upper pole of the right kidney. Abdominal MR without and with IV contrast may be pursued for further evaluation. can be done as an outpatient.

## 2020-12-09 NOTE — DISCHARGE NOTE PROVIDER - NSDCMRMEDTOKEN_GEN_ALL_CORE_FT
atorvastatin 40 mg oral tablet: 1 tab(s) orally once a day (at bedtime)  calcitonin 200 intl units/inh nasal spray: 1 spray(s) nasal once a day  digoxin 125 mcg (0.125 mg) oral tablet: 1 tab(s) orally once a day  dilTIAZem 360 mg/24 hours oral capsule, extended release: 1 cap(s) orally once a day  escitalopram 5 mg oral tablet: 1 tab(s) orally once a day  isosorbide mononitrate 60 mg oral tablet, extended release: 1 tab(s) orally once a day  metoprolol tartrate 25 mg oral tablet: 1 tab(s) orally 2 times a day  oxycodone-acetaminophen 5 mg-325 mg oral tablet: 1 tab(s) orally every 6 hours, As needed, Severe Pain (7 - 10)  polyethylene glycol 3350 oral powder for reconstitution: 17 gram(s) orally once a day, As needed, Constipation  TLSO Brace: TLSO Brace  To Be Worn With Ambulation Only  Dx: Inability to Ambulate  ICD10: R26.2  warfarin 5 mg oral tablet: 1 tab(s) orally once a day

## 2020-12-10 LAB
INR BLD: 1.82 RATIO — HIGH (ref 0.88–1.16)
PROTHROM AB SERPL-ACNC: 20.5 SEC — HIGH (ref 10.6–13.6)

## 2020-12-10 PROCEDURE — 99232 SBSQ HOSP IP/OBS MODERATE 35: CPT

## 2020-12-10 RX ORDER — WARFARIN SODIUM 2.5 MG/1
5 TABLET ORAL ONCE
Refills: 0 | Status: COMPLETED | OUTPATIENT
Start: 2020-12-10 | End: 2020-12-10

## 2020-12-10 RX ADMIN — Medication 25 MILLIGRAM(S): at 06:04

## 2020-12-10 RX ADMIN — Medication 360 MILLIGRAM(S): at 06:03

## 2020-12-10 RX ADMIN — CALCITONIN SALMON 1 SPRAY(S): 200 INJECTION, SOLUTION INTRAMUSCULAR at 11:59

## 2020-12-10 RX ADMIN — WARFARIN SODIUM 5 MILLIGRAM(S): 2.5 TABLET ORAL at 22:14

## 2020-12-10 RX ADMIN — ISOSORBIDE MONONITRATE 60 MILLIGRAM(S): 60 TABLET, EXTENDED RELEASE ORAL at 11:59

## 2020-12-10 RX ADMIN — Medication 0.12 MILLIGRAM(S): at 06:03

## 2020-12-10 RX ADMIN — ATORVASTATIN CALCIUM 40 MILLIGRAM(S): 80 TABLET, FILM COATED ORAL at 22:08

## 2020-12-10 RX ADMIN — ESCITALOPRAM OXALATE 5 MILLIGRAM(S): 10 TABLET, FILM COATED ORAL at 11:59

## 2020-12-10 RX ADMIN — Medication 25 MILLIGRAM(S): at 17:45

## 2020-12-10 NOTE — PROGRESS NOTE ADULT - SUBJECTIVE AND OBJECTIVE BOX
Patient is a 89y old  Female who presents with a chief complaint of fall (09 Dec 2020 14:03)      INTERVAL HPI/OVERNIGHT EVENTS:    Doing well; no back pain at rest, but increased w/ activity    REVIEW OF SYSTEMS:   Remaining ROS negative    MEDICATIONS  (STANDING):  atorvastatin 40 milliGRAM(s) Oral at bedtime  calcitonin Nasal 1 Spray(s) Nasal daily  digoxin     Tablet 0.125 milliGRAM(s) Oral daily  diltiazem    milliGRAM(s) Oral daily  escitalopram 5 milliGRAM(s) Oral daily  influenza   Vaccine 0.5 milliLiter(s) IntraMuscular once  isosorbide   mononitrate ER Tablet (IMDUR) 60 milliGRAM(s) Oral daily  metoprolol tartrate 25 milliGRAM(s) Oral two times a day  warfarin 5 milliGRAM(s) Oral once    MEDICATIONS  (PRN):  oxycodone    5 mG/acetaminophen 325 mG 1 Tablet(s) Oral every 6 hours PRN Severe Pain (7 - 10)  polyethylene glycol 3350 17 Gram(s) Oral daily PRN Constipation      Allergies    No Known Allergies    Intolerances        Vital Signs Last 24 Hrs  T(C): 36.8 (10 Dec 2020 16:55), Max: 36.8 (10 Dec 2020 16:55)  T(F): 98.3 (10 Dec 2020 16:55), Max: 98.3 (10 Dec 2020 16:55)  HR: 73 (10 Dec 2020 17:43) (65 - 74)  BP: 128/79 (10 Dec 2020 17:43) (113/64 - 148/86)  BP(mean): --  RR: 19 (10 Dec 2020 16:55) (18 - 19)  SpO2: 94% (10 Dec 2020 16:55) (92% - 96%)    PHYSICAL EXAM:  GENERAL: NAD  HEAD:  Atraumatic, Normocephalic  EYES: EOMI, PERRLA, conjunctiva and sclera clear  ENT: O/P Clear  NECK: Supple, No JVD  NERVOUS SYSTEM:  No focal deficits  CHEST/LUNG: Clear to percussion bilaterally; No rales, rhonchi, wheezing  HEART: Regular rate and rhythm; No murmurs, rubs, or gallops  ABDOMEN: Soft, Nontender, Nondistended; Bowel sounds present  EXTREMITIES:  2+ Peripheral Pulses, No clubbing, cyanosis, or edema  SKIN: No rashes or lesions    LABS:                        16.4   7.24  )-----------( 201      ( 09 Dec 2020 07:48 )             48.2     12-09    139  |  105  |  18  ----------------------------<  98  3.6   |  27  |  1.03    Ca    9.5      09 Dec 2020 07:48    TPro  x   /  Alb  x   /  TBili  0.9  /  DBili  x   /  AST  x   /  ALT  x   /  AlkPhos  x   12-09    PT/INR - ( 10 Dec 2020 10:30 )   PT: 20.5 sec;   INR: 1.82 ratio             CAPILLARY BLOOD GLUCOSE          RADIOLOGY & ADDITIONAL TESTS:    Imaging Personally Reviewed:  [ ] YES  [ ] NO    Consultant(s) Notes Reviewed:  [ ] YES  [ ] NO    Care Discussed with Consultants/Other Providers [ ] YES  [ ] NO

## 2020-12-11 VITALS
TEMPERATURE: 98 F | RESPIRATION RATE: 18 BRPM | DIASTOLIC BLOOD PRESSURE: 72 MMHG | SYSTOLIC BLOOD PRESSURE: 143 MMHG | HEART RATE: 63 BPM | OXYGEN SATURATION: 93 %

## 2020-12-11 LAB
INR BLD: 1.46 RATIO — HIGH (ref 0.88–1.16)
PROTHROM AB SERPL-ACNC: 16.6 SEC — HIGH (ref 10.6–13.6)

## 2020-12-11 PROCEDURE — 99238 HOSP IP/OBS DSCHRG MGMT 30/<: CPT

## 2020-12-11 RX ORDER — WARFARIN SODIUM 2.5 MG/1
6 TABLET ORAL ONCE
Refills: 0 | Status: DISCONTINUED | OUTPATIENT
Start: 2020-12-11 | End: 2020-12-11

## 2020-12-11 RX ADMIN — OXYCODONE AND ACETAMINOPHEN 1 TABLET(S): 5; 325 TABLET ORAL at 06:08

## 2020-12-11 RX ADMIN — CALCITONIN SALMON 1 SPRAY(S): 200 INJECTION, SOLUTION INTRAMUSCULAR at 12:31

## 2020-12-11 RX ADMIN — Medication 360 MILLIGRAM(S): at 06:10

## 2020-12-11 RX ADMIN — Medication 0.12 MILLIGRAM(S): at 06:10

## 2020-12-11 RX ADMIN — ISOSORBIDE MONONITRATE 60 MILLIGRAM(S): 60 TABLET, EXTENDED RELEASE ORAL at 12:30

## 2020-12-11 RX ADMIN — OXYCODONE AND ACETAMINOPHEN 1 TABLET(S): 5; 325 TABLET ORAL at 03:57

## 2020-12-11 RX ADMIN — Medication 25 MILLIGRAM(S): at 17:15

## 2020-12-11 RX ADMIN — ESCITALOPRAM OXALATE 5 MILLIGRAM(S): 10 TABLET, FILM COATED ORAL at 12:30

## 2020-12-11 NOTE — DISCHARGE NOTE NURSING/CASE MANAGEMENT/SOCIAL WORK - NSDCPEPTSTRK_GEN_ALL_CORE
Risk factors for stroke/Stroke warning signs and symptoms/Prescribed medications/Need for follow up after discharge/Stroke education booklet/Signs and symptoms of stroke/Call 911 for stroke/Stroke support groups for patients, families, and friends

## 2020-12-11 NOTE — PROGRESS NOTE ADULT - PROBLEM SELECTOR PLAN 8
davila placed in the ed.   TOV today

## 2020-12-11 NOTE — PROGRESS NOTE ADULT - ATTENDING COMMENTS
needs [facility] but family not picking up the phone
Disp: Rehab later today
Disp: Rehab once arrangements and authorization done
Disp: Rehab once arrangements and authorization done

## 2020-12-11 NOTE — PROGRESS NOTE ADULT - SUBJECTIVE AND OBJECTIVE BOX
Patient is a 89y old  Female who presents with a chief complaint of fall (10 Dec 2020 19:26)      INTERVAL HPI/OVERNIGHT EVENTS:     No back pain when at rest.    REVIEW OF SYSTEMS:   Remaining ROS negative    MEDICATIONS  (STANDING):  atorvastatin 40 milliGRAM(s) Oral at bedtime  calcitonin Nasal 1 Spray(s) Nasal daily  digoxin     Tablet 0.125 milliGRAM(s) Oral daily  diltiazem    milliGRAM(s) Oral daily  escitalopram 5 milliGRAM(s) Oral daily  influenza   Vaccine 0.5 milliLiter(s) IntraMuscular once  isosorbide   mononitrate ER Tablet (IMDUR) 60 milliGRAM(s) Oral daily  metoprolol tartrate 25 milliGRAM(s) Oral two times a day  warfarin 6 milliGRAM(s) Oral once    MEDICATIONS  (PRN):  oxycodone    5 mG/acetaminophen 325 mG 1 Tablet(s) Oral every 6 hours PRN Severe Pain (7 - 10)  polyethylene glycol 3350 17 Gram(s) Oral daily PRN Constipation      Allergies    No Known Allergies    Intolerances        Vital Signs Last 24 Hrs  T(C): 36.3 (11 Dec 2020 12:06), Max: 37.2 (10 Dec 2020 23:42)  T(F): 97.4 (11 Dec 2020 12:06), Max: 98.9 (10 Dec 2020 23:42)  HR: 58 (11 Dec 2020 12:06) (57 - 73)  BP: 145/87 (11 Dec 2020 12:06) (135/89 - 158/74)  BP(mean): --  RR: 18 (11 Dec 2020 12:06) (18 - 18)  SpO2: 95% (11 Dec 2020 12:06) (93% - 96%)    PHYSICAL EXAM:  GENERAL: NAD; sitting up in bed eating lunch  HEAD:  Atraumatic, Normocephalic  EYES: EOMI, PERRLA, conjunctiva and sclera clear  ENT: O/P Clear  NECK: Supple, No JVD  NERVOUS SYSTEM:  No focal deficits  CHEST/LUNG: Clear to percussion bilaterally; No rales, rhonchi, wheezing  HEART: Regular rate and rhythm; No murmurs, rubs, or gallops  ABDOMEN: Soft, Nontender, Nondistended; Bowel sounds present  EXTREMITIES:  2+ Peripheral Pulses, No clubbing, cyanosis, or edema  SKIN: No rashes or lesions    LABS:          PT/INR - ( 11 Dec 2020 06:18 )   PT: 16.6 sec;   INR: 1.46 ratio             CAPILLARY BLOOD GLUCOSE          RADIOLOGY & ADDITIONAL TESTS:    Imaging Personally Reviewed:  [ ] YES  [ ] NO    Consultant(s) Notes Reviewed:  [ ] YES  [ ] NO    Care Discussed with Consultants/Other Providers [ ] YES  [ ] NO

## 2020-12-11 NOTE — PROGRESS NOTE ADULT - PROBLEM SELECTOR PLAN 9
Stable  restarted home meds   imdur, cardizem, digoxin, lipitor
restart home meds   imdur 60   cardizem 360 daily   digoxin   lipitor

## 2020-12-11 NOTE — DISCHARGE NOTE NURSING/CASE MANAGEMENT/SOCIAL WORK - PATIENT PORTAL LINK FT
You can access the FollowMyHealth Patient Portal offered by [facility] by registering at the following website: [email or web address]. By joining [employer]’s FollowMyHealth portal, you will also be able to view your health information using other applications (apps) compatible with our system.

## 2020-12-12 ENCOUNTER — EMERGENCY (EMERGENCY)
Facility: HOSPITAL | Age: 85
LOS: 0 days | Discharge: HOME | End: 2020-12-13
Attending: STUDENT IN AN ORGANIZED HEALTH CARE EDUCATION/TRAINING PROGRAM
Payer: MEDICARE

## 2020-12-12 VITALS
HEIGHT: 67 IN | HEART RATE: 76 BPM | OXYGEN SATURATION: 97 % | TEMPERATURE: 98 F | SYSTOLIC BLOOD PRESSURE: 148 MMHG | RESPIRATION RATE: 17 BRPM | DIASTOLIC BLOOD PRESSURE: 88 MMHG | WEIGHT: 149.91 LBS

## 2020-12-12 DIAGNOSIS — S00.83XA CONTUSION OF OTHER PART OF HEAD, INITIAL ENCOUNTER: ICD-10-CM

## 2020-12-12 DIAGNOSIS — I10 ESSENTIAL (PRIMARY) HYPERTENSION: ICD-10-CM

## 2020-12-12 DIAGNOSIS — I48.91 UNSPECIFIED ATRIAL FIBRILLATION: ICD-10-CM
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
## 2020-12-12 DIAGNOSIS — Z79.01 LONG TERM (CURRENT) USE OF ANTICOAGULANTS: ICD-10-CM
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
## 2020-12-12 DIAGNOSIS — Z23 ENCOUNTER FOR IMMUNIZATION: ICD-10-CM

## 2020-12-12 DIAGNOSIS — F03.90 UNSPECIFIED DEMENTIA WITHOUT BEHAVIORAL DISTURBANCE: ICD-10-CM

## 2020-12-12 LAB
APTT BLD: 29.2 SEC — SIGNIFICANT CHANGE UP (ref 27.5–35.5)
BASOPHILS # BLD AUTO: 0.04 K/UL — SIGNIFICANT CHANGE UP (ref 0–0.2)
BASOPHILS NFR BLD AUTO: 0.5 % — SIGNIFICANT CHANGE UP (ref 0–2)
BLD GP AB SCN SERPL QL: SIGNIFICANT CHANGE UP
EOSINOPHIL # BLD AUTO: 0.1 K/UL — SIGNIFICANT CHANGE UP (ref 0–0.5)
EOSINOPHIL NFR BLD AUTO: 1.3 % — SIGNIFICANT CHANGE UP (ref 0–6)
GLUCOSE BLDC GLUCOMTR-MCNC: 104 MG/DL — HIGH (ref 70–99)
HCT VFR BLD CALC: 45.2 % — HIGH (ref 34.5–45)
HGB BLD-MCNC: 15.3 G/DL — SIGNIFICANT CHANGE UP (ref 11.5–15.5)
IMM GRANULOCYTES NFR BLD AUTO: 0.5 % — SIGNIFICANT CHANGE UP (ref 0–1.5)
INR BLD: 1.31 RATIO — HIGH (ref 0.88–1.16)
LYMPHOCYTES # BLD AUTO: 1.68 K/UL — SIGNIFICANT CHANGE UP (ref 1–3.3)
LYMPHOCYTES # BLD AUTO: 22 % — SIGNIFICANT CHANGE UP (ref 13–44)
MCHC RBC-ENTMCNC: 31.2 PG — SIGNIFICANT CHANGE UP (ref 27–34)
MCHC RBC-ENTMCNC: 33.8 GM/DL — SIGNIFICANT CHANGE UP (ref 32–36)
MCV RBC AUTO: 92.2 FL — SIGNIFICANT CHANGE UP (ref 80–100)
MONOCYTES # BLD AUTO: 0.67 K/UL — SIGNIFICANT CHANGE UP (ref 0–0.9)
MONOCYTES NFR BLD AUTO: 8.8 % — SIGNIFICANT CHANGE UP (ref 2–14)
NEUTROPHILS # BLD AUTO: 5.1 K/UL — SIGNIFICANT CHANGE UP (ref 1.8–7.4)
NEUTROPHILS NFR BLD AUTO: 66.9 % — SIGNIFICANT CHANGE UP (ref 43–77)
NRBC # BLD: 0 /100 WBCS — SIGNIFICANT CHANGE UP (ref 0–0)
PLATELET # BLD AUTO: 260 K/UL — SIGNIFICANT CHANGE UP (ref 150–400)
PROTHROM AB SERPL-ACNC: 15 SEC — HIGH (ref 10.6–13.6)
RBC # BLD: 4.9 M/UL — SIGNIFICANT CHANGE UP (ref 3.8–5.2)
RBC # FLD: 12.8 % — SIGNIFICANT CHANGE UP (ref 10.3–14.5)
WBC # BLD: 7.63 K/UL — SIGNIFICANT CHANGE UP (ref 3.8–10.5)
WBC # FLD AUTO: 7.63 K/UL — SIGNIFICANT CHANGE UP (ref 3.8–10.5)

## 2020-12-12 PROCEDURE — 70450 CT HEAD/BRAIN W/O DYE: CPT | Mod: 26

## 2020-12-12 PROCEDURE — 71250 CT THORAX DX C-: CPT | Mod: 26,MA

## 2020-12-12 PROCEDURE — 74176 CT ABD & PELVIS W/O CONTRAST: CPT | Mod: 26,MA

## 2020-12-12 PROCEDURE — 99285 EMERGENCY DEPT VISIT HI MDM: CPT

## 2020-12-12 PROCEDURE — 72125 CT NECK SPINE W/O DYE: CPT | Mod: 26

## 2020-12-12 PROCEDURE — 93010 ELECTROCARDIOGRAM REPORT: CPT

## 2020-12-12 RX ORDER — TETANUS TOXOID, REDUCED DIPHTHERIA TOXOID AND ACELLULAR PERTUSSIS VACCINE, ADSORBED 5; 2.5; 8; 8; 2.5 [IU]/.5ML; [IU]/.5ML; UG/.5ML; UG/.5ML; UG/.5ML
0.5 SUSPENSION INTRAMUSCULAR ONCE
Refills: 0 | Status: COMPLETED | OUTPATIENT
Start: 2020-12-12 | End: 2020-12-12

## 2020-12-12 RX ORDER — ACETAMINOPHEN 500 MG
650 TABLET ORAL ONCE
Refills: 0 | Status: COMPLETED | OUTPATIENT
Start: 2020-12-12 | End: 2020-12-12

## 2020-12-12 RX ADMIN — Medication 650 MILLIGRAM(S): at 23:54

## 2020-12-12 RX ADMIN — TETANUS TOXOID, REDUCED DIPHTHERIA TOXOID AND ACELLULAR PERTUSSIS VACCINE, ADSORBED 0.5 MILLILITER(S): 5; 2.5; 8; 8; 2.5 SUSPENSION INTRAMUSCULAR at 22:56

## 2020-12-12 NOTE — ED ADULT NURSE NOTE - OBJECTIVE STATEMENT
A&Ox1. pt presented to ED with s/p unwitnessed fall at [facility] today. this nurse notes laceration to left forehead, actively bleeding. pt currently on coumadin. pt at this time comfortable in bed, 0 s/s of distress noted.

## 2020-12-12 NOTE — ED ADULT TRIAGE NOTE - CHIEF COMPLAINT QUOTE
María Jeter is a 68 y.o. male, evaluated via audio-only technology on 7/31/2020 for Follow Up Chronic Condition (6 mo)  . Assessment & Plan:   Diagnoses and all orders for this visit:    1. Chronic systolic heart failure ([address]  Followed by cardiology in [place] at the [facility]  Blood pressure is stable  He has had no medications changes    2. Seizures ([facility]  denies having any seizures  Denies any s/s of dilantin toxicity  This is also managed by the [place] in [place]    3. Simple chronic bronchitis (HCC)  Denies any problems  Not currently followed by pulmonary  Currently on no medications    4. Other persistent atrial fibrillation (HCC)  Was on coumadin but had too many problems with management  Is now on Edoxaban daily    5. Decreased hearing of both ears  Found this on his [address] in January of this year  Did not go to ENT due to the pandemic  Is interested in getting hearing aids and wants to know if his insurance will pay for this  Advised him to call customer service number on his insurance cared to find out this information  He verbalized uncerstanding    Follow-up and Dispositions    · Return in about 6 months (around 1/31/2021), or if symptoms worsen or fail to improve, for MAWV, HTN, HLD, hyperthyroid, 45 min, office only.          12  Subjective:     Concern today  Still having hearing problems  Had appointment with ENT but had to cancel it due to the COVID-19  Wants to know if his insurance will cover hearing aids      Chronic heart failure  Hx of mitral valve repair   B/p at home today is 126/66, p 91, t 97.9  Followed by cardiolgy in [place]  Last visit was telephone 1 week ago  F/u in 6 months    Seizures  Has had no seizures  Taking medications daily  Seizure is a result of CVA in 2002  Followed by neurology in [place]  Last visit: 2/2020  This was the last time he had his dilantin level checked  Denies confusion, psychosis, sleepiness    COPD  Denies any problems with breathing, PT from [facility] S/P unwitnessed fall. pt is alert x 1, cut/ hematoma present to L yasmeen. on warfarin sob  Not using any inhalers  Is not followed by pulmonology    A-fib  Denies problems  On chronic anticoagulant  Followed by cardiology in [place]  Taking all medications daily    HLD:  Has been compliant with meds  all of the time  Compliant with low-fat diet. most of the time    Denies myalgias or other side effects. yes  Cholesterol, total   Date Value Ref Range Status   01/27/2020 123 100 - 199 mg/dL Final     Triglyceride   Date Value Ref Range Status   01/27/2020 277 (H) 0 - 149 mg/dL Final     HDL Cholesterol   Date Value Ref Range Status   01/27/2020 22 (L) >39 mg/dL Final     LDL, calculated   Date Value Ref Range Status   01/27/2020 46 0 - 99 mg/dL Final   ]  Key Antihyperlipidemia Meds             atorvastatin (LIPITOR) 40 mg tablet (Taking) Take 40 mg by mouth daily. Hypothyroidism  Patient complains of hypothyroidism. Symptoms include none. Patient denies weight changes, heat / cold intolerance, change in energy level, palpitations, nervousness, diarrhea. Onset of symptoms was problem is longstanding. Course to date has been stable. meds prescribed by [place]. Prior to Admission medications    Medication Sig Start Date End Date Taking? Authorizing Provider   furosemide (LASIX) 40 mg tablet Take 40 mg by mouth. Yes Provider, Historical   terazosin (HYTRIN) 2 mg capsule Take 2 mg by mouth. 5/9/19  Yes Provider, Historical   midodrine (PROAMITINE) 10 mg tablet Take 10 mg by mouth. 5/9/19  Yes Provider, Historical   potassium chloride SA (MICRO-K) 10 mEq capsule Take 10 mEq by mouth two (2) times a day. Yes Provider, Historical   dilTIAZem ER (CARDIZEM LA) 360 mg Tb24 tablet Take 360 mg by mouth daily. Yes Provider, Historical   metoprolol tartrate (LOPRESSOR) 50 mg tablet Take 75 mg by mouth two (2) times a day. Yes Provider, Historical   edoxaban (SAVAYSA) 30 mg tablet Take 30 mg by mouth daily.    Yes Provider, Historical   allopurinol (ZYLOPRIM) 100 mg tablet Take 100 mg by mouth daily. Yes Other, MD Francoise   aspirin delayed-release 81 mg tablet Take 81 mg by mouth daily. Yes Julian, MD Francoise   atorvastatin (LIPITOR) 40 mg tablet Take 40 mg by mouth daily. Yes Julian, MD Francoise   cholecalciferol, vitamin D3, 2,000 unit tab Take 2,000 Units by mouth daily. Yes Other, MD Francoise   ferrous sulfate 325 mg (65 mg iron) tablet Take 65 mg by mouth daily. Yes Julian, MD Francoise   levothyroxine (SYNTHROID) 112 mcg tablet Take 112 mcg by mouth Daily (before breakfast). Yes Other, MD Francoise   Omeprazole delayed release (PRILOSEC D/R) 20 mg tablet Take 20 mg by mouth daily. Yes Julian, MD Francoise   phenytoin ER (DILANTIN ER) 100 mg ER capsule Take 300 mg by mouth nightly. Take 3 (100mg) tablets every day at bedtime   Yes Other, MD Francoise   albuterol (VENTOLIN HFA) 90 mcg/actuation inhaler Take 2 Puffs by inhalation every four (4) hours as needed for Wheezing.  7/31/20  Other, MD Francoise     Patient Active Problem List   Diagnosis Code    Systolic heart failure ([facility]) I50.20    A-fib ([facility]) I48.91    Chronic obstructive pulmonary disease ([facility]) J44.9    Seizures ([facility]) R56.9    Anemia D64.9    CKD (chronic kidney disease) N18.9    Hypothyroid E03.9    History of heart valve repair Z98.890    Mixed hyperlipidemia E78.2     Patient Active Problem List    Diagnosis Date Noted    Seizures ([facility] 01/27/2020    History of heart valve repair 01/27/2020    Mixed hyperlipidemia 01/27/2020    Anemia 04/27/2019    CKD (chronic kidney disease) 04/27/2019    Hypothyroid 04/27/2019    Chronic obstructive pulmonary disease ([facility] [address] 55/36/3623    Systolic heart failure ([facility] [facility] 04/18/2019    [facility] 04/18/2019     Current Outpatient Medications   Medication Sig Dispense Refill    furosemide (LASIX) 40 mg tablet Take 40 mg by mouth.  terazosin (HYTRIN) 2 mg capsule Take 2 mg by mouth.  midodrine (PROAMITINE) 10 mg tablet Take 10 mg by mouth.       potassium chloride SA (MICRO-K) 10 mEq capsule Take 10 mEq by mouth two (2) times a day.  dilTIAZem ER (CARDIZEM LA) 360 mg Tb24 tablet Take 360 mg by mouth daily.  metoprolol tartrate (LOPRESSOR) 50 mg tablet Take 75 mg by mouth two (2) times a day.  edoxaban (SAVAYSA) 30 mg tablet Take 30 mg by mouth daily.  allopurinol (ZYLOPRIM) 100 mg tablet Take 100 mg by mouth daily.  aspirin delayed-release 81 mg tablet Take 81 mg by mouth daily.  atorvastatin (LIPITOR) 40 mg tablet Take 40 mg by mouth daily.  cholecalciferol, vitamin D3, 2,000 unit tab Take 2,000 Units by mouth daily.  ferrous sulfate 325 mg (65 mg iron) tablet Take 65 mg by mouth daily.  levothyroxine (SYNTHROID) 112 mcg tablet Take 112 mcg by mouth Daily (before breakfast).  Omeprazole delayed release (PRILOSEC D/R) 20 mg tablet Take 20 mg by mouth daily.  phenytoin ER (DILANTIN ER) 100 mg ER capsule Take 300 mg by mouth nightly. Take 3 (100mg) tablets every day at bedtime       No Known Allergies  Past Medical History:   Diagnosis Date    A-fib ([address]     Chronic obstructive pulmonary disease (HCC)     MI (myocardial infarction) ([facility] [address]     Seizures ([facility] [address]     2002    [facility]     [facility]     Past Surgical History:   Procedure Laterality Date    CARDIAC SURG PROCEDURE UNLIST       Family History   Problem Relation Age of Onset    Heart Disease Father     Heart Attack Father      Social History     Tobacco Use    Smoking status: Former Smoker     Packs/day: 1.00     Years: 60.00     Pack years: 60.00    Smokeless tobacco: Never Used   Substance Use Topics    Alcohol use: Not Currently       ROS  As stated in HPI, otherwise all others negative. No flowsheet data found. Stiles Heron, who was evaluated through a patient-initiated, synchronous (real-time) audio only encounter, and/or her healthcare decision maker, is aware that it is a billable service, with coverage as determined by his insurance carrier.  He provided verbal consent to proceed: Yes. He has not had a related appointment within my department in the past 7 days or scheduled within the next 24 hours.       Total Time: minutes: 21-30 minutes    Justin Ambrose NP PT from [facility] S/P unwitnessed fall. pt is alert x 1, cut/ hematoma  actively bleeding present to L yasmeen. on warfarin.

## 2020-12-12 NOTE — ED ADULT NURSE REASSESSMENT NOTE - NS ED NURSE REASSESS COMMENT FT1
received report from VELASQUEZ Zimmerman. pt on the bed sleeping. hooked to cardiac monitor. wound dressed.

## 2020-12-12 NOTE — ED ADULT NURSE NOTE - INTERVENTIONS DEFINITIONS
Instruct patient to call for assistance/Physically safe environment: no spills, clutter or unnecessary equipment/Call bell, personal items and telephone within reach/Non-slip footwear when patient is off stretcher

## 2020-12-12 NOTE — ED ADULT NURSE NOTE - CHIEF COMPLAINT QUOTE
PT from [facility] S/P unwitnessed fall. pt is alert x 1, cut/ hematoma  actively bleeding present to L yasmeen. on warfarin.

## 2020-12-12 NOTE — ED ADULT TRIAGE NOTE - ESI TRIAGE ACUITY LEVEL, MLM
----- Message from Hari Curiel MD sent at 4/29/2017 12:44 AM CDT -----  Please notify patient with results. + Barboza's. Neg dysplasia. PPI . Schedule repeat EGD in 3 years.  
Spoke with patient and explained results and recommendations below. Patient verbalized understanding and denied further questions  Updated surgical history and wait list.  
2

## 2020-12-13 VITALS
SYSTOLIC BLOOD PRESSURE: 122 MMHG | TEMPERATURE: 98 F | DIASTOLIC BLOOD PRESSURE: 81 MMHG | RESPIRATION RATE: 21 BRPM | HEART RATE: 85 BPM | OXYGEN SATURATION: 94 %

## 2020-12-13 LAB
ALBUMIN SERPL ELPH-MCNC: 3.3 G/DL — SIGNIFICANT CHANGE UP (ref 3.3–5)
ALP SERPL-CCNC: 127 U/L — HIGH (ref 40–120)
ALT FLD-CCNC: 34 U/L — SIGNIFICANT CHANGE UP (ref 12–78)
ANION GAP SERPL CALC-SCNC: 6 MMOL/L — SIGNIFICANT CHANGE UP (ref 5–17)
AST SERPL-CCNC: 32 U/L — SIGNIFICANT CHANGE UP (ref 15–37)
BILIRUB SERPL-MCNC: 0.6 MG/DL — SIGNIFICANT CHANGE UP (ref 0.2–1.2)
BUN SERPL-MCNC: 21 MG/DL — SIGNIFICANT CHANGE UP (ref 7–23)
CALCIUM SERPL-MCNC: 9.6 MG/DL — SIGNIFICANT CHANGE UP (ref 8.5–10.1)
CHLORIDE SERPL-SCNC: 104 MMOL/L — SIGNIFICANT CHANGE UP (ref 96–108)
CO2 SERPL-SCNC: 30 MMOL/L — SIGNIFICANT CHANGE UP (ref 22–31)
CREAT SERPL-MCNC: 1.17 MG/DL — SIGNIFICANT CHANGE UP (ref 0.5–1.3)
GLUCOSE SERPL-MCNC: 104 MG/DL — HIGH (ref 70–99)
POTASSIUM SERPL-MCNC: 3.8 MMOL/L — SIGNIFICANT CHANGE UP (ref 3.5–5.3)
POTASSIUM SERPL-SCNC: 3.8 MMOL/L — SIGNIFICANT CHANGE UP (ref 3.5–5.3)
PROT SERPL-MCNC: 8.1 GM/DL — SIGNIFICANT CHANGE UP (ref 6–8.3)
SODIUM SERPL-SCNC: 140 MMOL/L — SIGNIFICANT CHANGE UP (ref 135–145)

## 2020-12-13 NOTE — ED PROVIDER NOTE - PROGRESS NOTE DETAILS
Nohemi: head CT negative, hematoma bleeding stopped, patient moving all extremities, mentating at baseline per chart review. DC to nursing home, per NH, pt gets around with wheelchair, constant observation, however patient got out of chair when someone turned their back. Confirm no LOC.

## 2020-12-13 NOTE — ED PROVIDER NOTE - PHYSICAL EXAMINATION
VITALS: reviewed  GEN: NAD, A & O x 1  HEAD/EYES: hematoma to L scalp, EOMI, anicteric sclerae, no conjunctival pallor  ENT: mucus membranes moist, oropharynx WNL, trachea midline,  RESP: lungs CTA with equal breath sounds bilaterally, chest wall nontender and atraumatic  CV: heart with reg rhythm S1, S2; distal pulses intact and symmetric bilaterally  ABDOMEN: normoactive bowel sounds, soft, nondistended, nontender, no palpable masses  : no CVAT  MSK: extremities atraumatic and nontender, no edema, no asymmetry. the back is without midline or lateral tenderness, there is no spinal deformity or stepoff and the back is ranged painlessly. the neck has no midline tenderness, deformity, or stepoff, and is ranged painlessly.  SKIN: warm, dry, no rash, no bruising, no cyanosis. color appropriate for ethnicity  NEURO: alert, mentating appropriately, no facial asymmetry. gross sensation, motor, are intact  PSYCH: Affect appropriate

## 2020-12-13 NOTE — ED PROVIDER NOTE - OBJECTIVE STATEMENT
89 yo F hx dementia, htn, afib on coumadin, hx CVA BIBA s/p fall at NH with hematoma to L forehead. No reported LOC.

## 2020-12-13 NOTE — ED PROVIDER NOTE - CLINICAL SUMMARY MEDICAL DECISION MAKING FREE TEXT BOX
89 yo F presenting s/p fall with hematoma to L forehead with active bleeding from hematoma, surgicel placed with pressure dressing. CT-H negative for bleed, bleeding from hematoma subsided. labs wnl, DC to NH.

## 2020-12-16 DIAGNOSIS — M80.00XD AGE-RELATED OSTEOPOROSIS WITH CURRENT PATHOLOGICAL FRACTURE, UNSPECIFIED SITE, SUBSEQUENT ENCOUNTER FOR FRACTURE WITH ROUTINE HEALING: ICD-10-CM

## 2020-12-18 DIAGNOSIS — F03.90 UNSPECIFIED DEMENTIA WITHOUT BEHAVIORAL DISTURBANCE: ICD-10-CM

## 2020-12-18 DIAGNOSIS — I48.91 UNSPECIFIED ATRIAL FIBRILLATION: ICD-10-CM
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
## 2020-12-18 DIAGNOSIS — N30.00 ACUTE CYSTITIS WITHOUT HEMATURIA: ICD-10-CM

## 2020-12-18 DIAGNOSIS — N28.1 CYST OF KIDNEY, ACQUIRED: ICD-10-CM
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
## 2020-12-18 DIAGNOSIS — R33.9 RETENTION OF URINE, UNSPECIFIED: ICD-10-CM

## 2020-12-18 DIAGNOSIS — I50.22 CHRONIC SYSTOLIC (CONGESTIVE) HEART FAILURE: ICD-10-CM

## 2020-12-18 DIAGNOSIS — M48.56XA COLLAPSED VERTEBRA, NOT ELSEWHERE CLASSIFIED, LUMBAR REGION, INITIAL ENCOUNTER FOR FRACTURE: ICD-10-CM

## 2020-12-18 DIAGNOSIS — M80.08XA AGE-RELATED OSTEOPOROSIS WITH CURRENT PATHOLOGICAL FRACTURE, VERTEBRA(E), INITIAL ENCOUNTER FOR FRACTURE: ICD-10-CM

## 2020-12-18 DIAGNOSIS — I10 ESSENTIAL (PRIMARY) HYPERTENSION: ICD-10-CM

## 2020-12-18 DIAGNOSIS — S32.029A UNSPECIFIED FRACTURE OF SECOND LUMBAR VERTEBRA, INITIAL ENCOUNTER FOR CLOSED FRACTURE: ICD-10-CM

## 2021-01-03 ENCOUNTER — INPATIENT (INPATIENT)
Facility: HOSPITAL | Age: 86
LOS: 3 days | Discharge: NURSING, REHAB OR LONG-TERM CARE | End: 2021-01-07
Attending: INTERNAL MEDICINE | Admitting: INTERNAL MEDICINE
Payer: MEDICARE

## 2021-01-03 VITALS
OXYGEN SATURATION: 97 % | RESPIRATION RATE: 16 BRPM | HEIGHT: 67 IN | SYSTOLIC BLOOD PRESSURE: 147 MMHG | HEART RATE: 60 BPM | WEIGHT: 134.04 LBS | DIASTOLIC BLOOD PRESSURE: 80 MMHG | TEMPERATURE: 99 F

## 2021-01-03 DIAGNOSIS — F03.90 UNSPECIFIED DEMENTIA, UNSPECIFIED SEVERITY, WITHOUT BEHAVIORAL DISTURBANCE, PSYCHOTIC DISTURBANCE, MOOD DISTURBANCE, AND ANXIETY: ICD-10-CM

## 2021-01-03 DIAGNOSIS — I10 ESSENTIAL (PRIMARY) HYPERTENSION: ICD-10-CM

## 2021-01-03 DIAGNOSIS — I50.22 CHRONIC SYSTOLIC (CONGESTIVE) HEART FAILURE: ICD-10-CM

## 2021-01-03 DIAGNOSIS — I48.91 UNSPECIFIED ATRIAL FIBRILLATION: ICD-10-CM

## 2021-01-03 DIAGNOSIS — M80.08XA AGE-RELATED OSTEOPOROSIS WITH CURRENT PATHOLOGICAL FRACTURE, VERTEBRA(E), INITIAL ENCOUNTER FOR FRACTURE: ICD-10-CM

## 2021-01-03 DIAGNOSIS — S72.009A FRACTURE OF UNSPECIFIED PART OF NECK OF UNSPECIFIED FEMUR, INITIAL ENCOUNTER FOR CLOSED FRACTURE: ICD-10-CM

## 2021-01-03 LAB
ALBUMIN SERPL ELPH-MCNC: 3 G/DL — LOW (ref 3.3–5)
ALP SERPL-CCNC: 108 U/L — SIGNIFICANT CHANGE UP (ref 40–120)
ALT FLD-CCNC: 34 U/L — SIGNIFICANT CHANGE UP (ref 12–78)
ANION GAP SERPL CALC-SCNC: 6 MMOL/L — SIGNIFICANT CHANGE UP (ref 5–17)
APPEARANCE UR: CLEAR — SIGNIFICANT CHANGE UP
APTT BLD: 37.8 SEC — HIGH (ref 27.5–35.5)
AST SERPL-CCNC: 34 U/L — SIGNIFICANT CHANGE UP (ref 15–37)
BACTERIA # UR AUTO: ABNORMAL
BASE EXCESS BLDA CALC-SCNC: 1.8 MMOL/L — SIGNIFICANT CHANGE UP (ref -2–2)
BASOPHILS # BLD AUTO: 0.02 K/UL — SIGNIFICANT CHANGE UP (ref 0–0.2)
BASOPHILS NFR BLD AUTO: 0.1 % — SIGNIFICANT CHANGE UP (ref 0–2)
BILIRUB SERPL-MCNC: 1.2 MG/DL — SIGNIFICANT CHANGE UP (ref 0.2–1.2)
BILIRUB UR-MCNC: NEGATIVE — SIGNIFICANT CHANGE UP
BLD GP AB SCN SERPL QL: SIGNIFICANT CHANGE UP
BLOOD GAS COMMENTS: SIGNIFICANT CHANGE UP
BLOOD GAS COMMENTS: SIGNIFICANT CHANGE UP
BLOOD GAS SOURCE: SIGNIFICANT CHANGE UP
BUN SERPL-MCNC: 28 MG/DL — HIGH (ref 7–23)
CALCIUM SERPL-MCNC: 8.7 MG/DL — SIGNIFICANT CHANGE UP (ref 8.5–10.1)
CHLORIDE SERPL-SCNC: 107 MMOL/L — SIGNIFICANT CHANGE UP (ref 96–108)
CO2 SERPL-SCNC: 25 MMOL/L — SIGNIFICANT CHANGE UP (ref 22–31)
COLOR SPEC: YELLOW — SIGNIFICANT CHANGE UP
COMMENT - URINE: SIGNIFICANT CHANGE UP
CREAT SERPL-MCNC: 1.21 MG/DL — SIGNIFICANT CHANGE UP (ref 0.5–1.3)
DIFF PNL FLD: ABNORMAL
DIGOXIN SERPL-MCNC: 1.06 NG/ML — SIGNIFICANT CHANGE UP (ref 0.8–2)
EOSINOPHIL # BLD AUTO: 0 K/UL — SIGNIFICANT CHANGE UP (ref 0–0.5)
EOSINOPHIL NFR BLD AUTO: 0 % — SIGNIFICANT CHANGE UP (ref 0–6)
FLUAV AG NPH QL: SIGNIFICANT CHANGE UP COUNTS
FLUBV AG NPH QL: SIGNIFICANT CHANGE UP COUNTS
GLUCOSE SERPL-MCNC: 158 MG/DL — HIGH (ref 70–99)
GLUCOSE UR QL: NEGATIVE MG/DL — SIGNIFICANT CHANGE UP
HCO3 BLDA-SCNC: 25 MMOL/L — SIGNIFICANT CHANGE UP (ref 21–29)
HCT VFR BLD CALC: 34.4 % — LOW (ref 34.5–45)
HGB BLD-MCNC: 11.1 G/DL — LOW (ref 11.5–15.5)
HOROWITZ INDEX BLDA+IHG-RTO: 0.21 — SIGNIFICANT CHANGE UP
IMM GRANULOCYTES NFR BLD AUTO: 0.6 % — SIGNIFICANT CHANGE UP (ref 0–1.5)
INR BLD: 3.46 RATIO — HIGH (ref 0.88–1.16)
KETONES UR-MCNC: NEGATIVE — SIGNIFICANT CHANGE UP
LEUKOCYTE ESTERASE UR-ACNC: NEGATIVE — SIGNIFICANT CHANGE UP
LYMPHOCYTES # BLD AUTO: 0.67 K/UL — LOW (ref 1–3.3)
LYMPHOCYTES # BLD AUTO: 4.3 % — LOW (ref 13–44)
MAGNESIUM SERPL-MCNC: 2 MG/DL — SIGNIFICANT CHANGE UP (ref 1.6–2.6)
MCHC RBC-ENTMCNC: 31.2 PG — SIGNIFICANT CHANGE UP (ref 27–34)
MCHC RBC-ENTMCNC: 32.3 GM/DL — SIGNIFICANT CHANGE UP (ref 32–36)
MCV RBC AUTO: 96.6 FL — SIGNIFICANT CHANGE UP (ref 80–100)
MONOCYTES # BLD AUTO: 1.04 K/UL — HIGH (ref 0–0.9)
MONOCYTES NFR BLD AUTO: 6.7 % — SIGNIFICANT CHANGE UP (ref 2–14)
NEUTROPHILS # BLD AUTO: 13.71 K/UL — HIGH (ref 1.8–7.4)
NEUTROPHILS NFR BLD AUTO: 88.3 % — HIGH (ref 43–77)
NITRITE UR-MCNC: NEGATIVE — SIGNIFICANT CHANGE UP
NRBC # BLD: 0 /100 WBCS — SIGNIFICANT CHANGE UP (ref 0–0)
NT-PROBNP SERPL-SCNC: 4300 PG/ML — HIGH (ref 0–450)
PCO2 BLDA: 36 MMHG — SIGNIFICANT CHANGE UP (ref 32–46)
PH BLD: 7.45 — SIGNIFICANT CHANGE UP (ref 7.35–7.45)
PH UR: 5 — SIGNIFICANT CHANGE UP (ref 5–8)
PLATELET # BLD AUTO: 197 K/UL — SIGNIFICANT CHANGE UP (ref 150–400)
PO2 BLDA: <47 MMHG — CRITICAL LOW (ref 74–108)
POTASSIUM SERPL-MCNC: 4.2 MMOL/L — SIGNIFICANT CHANGE UP (ref 3.5–5.3)
POTASSIUM SERPL-SCNC: 4.2 MMOL/L — SIGNIFICANT CHANGE UP (ref 3.5–5.3)
PROT SERPL-MCNC: 7.5 GM/DL — SIGNIFICANT CHANGE UP (ref 6–8.3)
PROT UR-MCNC: 100 MG/DL
PROTHROM AB SERPL-ACNC: 37.8 SEC — HIGH (ref 10.6–13.6)
RBC # BLD: 3.56 M/UL — LOW (ref 3.8–5.2)
RBC # FLD: 13.9 % — SIGNIFICANT CHANGE UP (ref 10.3–14.5)
RSV RNA NPH QL NAA+NON-PROBE: SIGNIFICANT CHANGE UP COUNTS
SAO2 % BLDA: 77 % — LOW (ref 92–96)
SARS-COV-2 RNA SPEC QL NAA+PROBE: SIGNIFICANT CHANGE UP COUNTS
SODIUM SERPL-SCNC: 138 MMOL/L — SIGNIFICANT CHANGE UP (ref 135–145)
SP GR SPEC: 1.02 — SIGNIFICANT CHANGE UP (ref 1.01–1.02)
TROPONIN I SERPL-MCNC: 0.25 NG/ML — HIGH (ref 0.01–0.04)
TSH SERPL-MCNC: 2.59 UIU/ML — SIGNIFICANT CHANGE UP (ref 0.36–3.74)
UROBILINOGEN FLD QL: NEGATIVE MG/DL — SIGNIFICANT CHANGE UP
WBC # BLD: 15.54 K/UL — HIGH (ref 3.8–10.5)
WBC # FLD AUTO: 15.54 K/UL — HIGH (ref 3.8–10.5)
WBC UR QL: SIGNIFICANT CHANGE UP

## 2021-01-03 PROCEDURE — 93010 ELECTROCARDIOGRAM REPORT: CPT

## 2021-01-03 PROCEDURE — 93306 TTE W/DOPPLER COMPLETE: CPT | Mod: 26

## 2021-01-03 PROCEDURE — 99285 EMERGENCY DEPT VISIT HI MDM: CPT

## 2021-01-03 PROCEDURE — 99223 1ST HOSP IP/OBS HIGH 75: CPT

## 2021-01-03 PROCEDURE — 70450 CT HEAD/BRAIN W/O DYE: CPT | Mod: 26

## 2021-01-03 PROCEDURE — 72125 CT NECK SPINE W/O DYE: CPT | Mod: 26

## 2021-01-03 PROCEDURE — 71045 X-RAY EXAM CHEST 1 VIEW: CPT | Mod: 26

## 2021-01-03 PROCEDURE — 73552 X-RAY EXAM OF FEMUR 2/>: CPT | Mod: 26,RT

## 2021-01-03 PROCEDURE — 73502 X-RAY EXAM HIP UNI 2-3 VIEWS: CPT | Mod: 26,RT

## 2021-01-03 RX ORDER — POLYETHYLENE GLYCOL 3350 17 G/17G
17 POWDER, FOR SOLUTION ORAL DAILY
Refills: 0 | Status: DISCONTINUED | OUTPATIENT
Start: 2021-01-03 | End: 2021-01-04

## 2021-01-03 RX ORDER — DIGOXIN 250 MCG
0.12 TABLET ORAL DAILY
Refills: 0 | Status: DISCONTINUED | OUTPATIENT
Start: 2021-01-03 | End: 2021-01-04

## 2021-01-03 RX ORDER — FUROSEMIDE 40 MG
40 TABLET ORAL ONCE
Refills: 0 | Status: COMPLETED | OUTPATIENT
Start: 2021-01-03 | End: 2021-01-03

## 2021-01-03 RX ORDER — MAGNESIUM HYDROXIDE 400 MG/1
30 TABLET, CHEWABLE ORAL DAILY
Refills: 0 | Status: DISCONTINUED | OUTPATIENT
Start: 2021-01-03 | End: 2021-01-04

## 2021-01-03 RX ORDER — ISOSORBIDE MONONITRATE 60 MG/1
60 TABLET, EXTENDED RELEASE ORAL DAILY
Refills: 0 | Status: DISCONTINUED | OUTPATIENT
Start: 2021-01-03 | End: 2021-01-04

## 2021-01-03 RX ORDER — PHYTONADIONE (VIT K1) 5 MG
5 TABLET ORAL ONCE
Refills: 0 | Status: COMPLETED | OUTPATIENT
Start: 2021-01-03 | End: 2021-01-03

## 2021-01-03 RX ORDER — OXYCODONE AND ACETAMINOPHEN 5; 325 MG/1; MG/1
1 TABLET ORAL EVERY 6 HOURS
Refills: 0 | Status: DISCONTINUED | OUTPATIENT
Start: 2021-01-03 | End: 2021-01-04

## 2021-01-03 RX ORDER — SENNA PLUS 8.6 MG/1
2 TABLET ORAL AT BEDTIME
Refills: 0 | Status: DISCONTINUED | OUTPATIENT
Start: 2021-01-03 | End: 2021-01-04

## 2021-01-03 RX ORDER — METOPROLOL TARTRATE 50 MG
25 TABLET ORAL
Refills: 0 | Status: DISCONTINUED | OUTPATIENT
Start: 2021-01-03 | End: 2021-01-04

## 2021-01-03 RX ORDER — DILTIAZEM HCL 120 MG
300 CAPSULE, EXT RELEASE 24 HR ORAL DAILY
Refills: 0 | Status: DISCONTINUED | OUTPATIENT
Start: 2021-01-03 | End: 2021-01-04

## 2021-01-03 RX ORDER — ATORVASTATIN CALCIUM 80 MG/1
40 TABLET, FILM COATED ORAL AT BEDTIME
Refills: 0 | Status: DISCONTINUED | OUTPATIENT
Start: 2021-01-03 | End: 2021-01-04

## 2021-01-03 RX ADMIN — ISOSORBIDE MONONITRATE 60 MILLIGRAM(S): 60 TABLET, EXTENDED RELEASE ORAL at 14:35

## 2021-01-03 RX ADMIN — Medication 25 MILLIGRAM(S): at 14:35

## 2021-01-03 RX ADMIN — SENNA PLUS 2 TABLET(S): 8.6 TABLET ORAL at 13:55

## 2021-01-03 RX ADMIN — Medication 40 MILLIGRAM(S): at 12:27

## 2021-01-03 RX ADMIN — ATORVASTATIN CALCIUM 40 MILLIGRAM(S): 80 TABLET, FILM COATED ORAL at 14:35

## 2021-01-03 RX ADMIN — Medication 300 MILLIGRAM(S): at 14:35

## 2021-01-03 RX ADMIN — Medication 5 MILLIGRAM(S): at 13:55

## 2021-01-03 NOTE — H&P ADULT - NSICDXPASTMEDICALHX_GEN_ALL_CORE_FT
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

## 2021-01-03 NOTE — ED ADULT NURSE NOTE - ED STAT RN HANDOFF DETAILS
Report endorsed to oncoming RN Jennifer. Safety checks completed this shift. Safety rounds completed hourly.  IV sites checked Q2+remains WDL. Medications administered as ordered with no signs/symptoms of adverse reactions. Fall & skin precautions in place. Any issues endorsed to oncoming RN for follow up.

## 2021-01-03 NOTE — ED ADULT NURSE REASSESSMENT NOTE - ED CARDIAC RATE
[facility] refill request for:      LORazepam (ATIVAN) 1 MG tablet 60 tablet 1 8/31/2020     Sig - Route: Take 1 tablet by mouth every 6 hours as needed (severe anxiety). - Oral    Sent to pharmacy as: LORazepam 1 MG Oral Tablet (ATIVAN)    Class: Eprescribe      NH- 09/28/2020  Route to provider for review.    
39/bradycardic

## 2021-01-03 NOTE — H&P ADULT - HISTORY OF PRESENT ILLNESS
· HPI Objective Statement: 89 year old female, from [facility] home w/PMH dementia HTN, afib, CHF, HLD, who presents to the ED today for right hip pain, s/p fall yesterday. Noted ecchymosis to the right hip. Pt is AO x 1. Pt per chart is not on any anticoagulation, likely fall risks. Pt with negative COVID 12/31. Denies urinary incontinence, hematuria, pain, headaches, CP, neck pain, dizziness, LOC, coughs, fevers, chills, generalized weakness, nausea, vomit, diarrhea, numbness, tingling, back pain, or SOB.

## 2021-01-03 NOTE — H&P ADULT - NSHPREVIEWOFSYSTEMS_GEN_ALL_CORE
REVIEW OF SYSTEMS:    CONSTITUTIONAL: No weakness, fevers or chills. demented  EYES/ENT: No visual changes;  No vertigo or throat pain   NECK: No pain or stiffness  RESPIRATORY: No cough, wheezing, hemoptysis; No shortness of breath  CARDIOVASCULAR: No chest pain or palpitations [ ] CHF [ ] MI [ ] CABG [ ]  GASTROINTESTINAL: No abdominal or epigastric pain. No nausea, vomiting, or hematemesis; No diarrhea or constipation. No melena or hematochezia. [ ]abdominal surgery [ ] prostrate problems   GENITOURINARY: No dysuria, frequency or hematuria   NEUROLOGICAL: No numbness or weakness. No hx of seizures  SKIN: No itching, burning, rashes, or lesions   All other review of systems is negative unless indicated above.

## 2021-01-03 NOTE — ED PROVIDER NOTE - PHYSICAL EXAMINATION
Gen: Alert, mild tachypnea  Head: NC, AT, PERRL, normal lids/conjunctiva   ENT: Bilateral TM WNL, patent oropharynx without erythema/exudate, uvula midline  Neck: supple, no tenderness/meningismus  Pulm: b/l rales  CV: irregular, no M/R/G, +dist pulses   Abd: soft, NT/ND, +BS, no guarding/rebound tenderness  Mskel: + LE edema, no erythema/cyanosis   Skin: no rash, no bruising  Neuro: AAOx1, no sensory/motor deficits, CN 2-12 intact

## 2021-01-03 NOTE — H&P ADULT - NSHPLABSRESULTS_GEN_ALL_CORE
11.1                 138  | 25   | 28           15.54 >-----------< 197     ------------------------< 158                   34.4                 4.2  | 107  | 1.21                                         Ca 8.7   Mg 2.0   Ph x      PT/INR - ( 03 Jan 2021 11:11 )   PT: 37.8 sec;   INR: 3.46 ratio         PTT - ( 03 Jan 2021 11:11 )  PTT:37.8 sec  ct head - no acute pathology   EKG A. fib ? old as ischemia  old cts of chest and abd reviewed- Fracture of T4, T12. L1( from dec 2020)

## 2021-01-03 NOTE — CONSULT NOTE ADULT - SUBJECTIVE AND OBJECTIVE BOX
Orthopaedic Surgery Consult Note    Pt is a 89y Female presenting with right hip pain s/p mechanical fall at nursing facility. Pt has dementia and does not remember event, furthermore patient cannot provide any history due to mental status. Attempted to call son Denis but was unable to reach him, multiple attempts by ED/EMS have been unsuccessful to reach family. Pt on coumadin for Afib.  PMHx:  RIGHT HIP PAIN    No pertinent family history in first degree relatives    MEWS Score    Hypertension    A-fib    CVA (cerebral vascular accident)    A-fib    CVA (cerebral vascular accident)    No significant past surgical history    No significant past surgical history    RIGHT HIP PAIN    21    SysAdmin_VisitLink      PSHx:    Allergies:  No Known Allergies    Medications:    Social: Denies smoking, EtOH, illicit drug use.    Labs:                        11.1   15.54 )-----------( 197      ( 03 Jan 2021 11:11 )             34.4     01-03    138  |  107  |  28<H>  ----------------------------<  158<H>  4.2   |  25  |  1.21    Ca    8.7      03 Jan 2021 11:11  Mg     2.0     01-03    TPro  7.5  /  Alb  3.0<L>  /  TBili  1.2  /  DBili  x   /  AST  34  /  ALT  34  /  AlkPhos  108  01-03    PT/INR - ( 03 Jan 2021 11:11 )   PT: 37.8 sec;   INR: 3.46 ratio         PTT - ( 03 Jan 2021 11:11 )  PTT:37.8 sec    Vitals:  T(C): 37.1 (01-03-21 @ 10:28), Max: 37.1 (01-03-21 @ 10:28)  HR: 60 (01-03-21 @ 10:28) (60 - 60)  BP: 147/80 (01-03-21 @ 10:28) (147/80 - 147/80)  RR: 16 (01-03-21 @ 10:28) (16 - 16)  SpO2: 97% (01-03-21 @ 10:28) (97% - 97%)    Physical Exam:  Gen: Confused, AxOx0    RLE:   Skin intact  No edema, erythema, ecchymosis  No gross deformity  Limb is shortened and externally rotated  No bony TTP of Knee/Foot/Toes  Unable to SLR  Tender with logroll/heelstrike  +EHL/FHL/TA/GS  SILT L2-S1  +DP/PT Pulses  Compartments soft and compressible  No calf TTP B/L    Secondary Assessment:  NC/AT, NTTP of clavicles, NTTP of C-,T-,L-Spine, NTTP of Pelvis  UEs: NTTP of Shoulders, Elbows, Wrists, Hands; NT with AROM/PROM of Shoulders, Elbows, Wrists, Hands; AIN/PIN/Med/Uln/Msc/Rad/Ax intact  LLE: Able to SLR, NT with Log Roll, NT with Heel Strike, NTTP of Hip, Knee, Ankle, Foot; NT with AROM/PROM of Hip, Knee, Ankle, Foot; Q/H/Gsc/TA/EHL/FHL intact    Pt is a 89y Female with a R Intertrochanteric Hip Fracture.  -Admit to medicine  -Closed fracture, NV intact  -Pain control  -NWB affected extremity  -Bedrest  -FU Pre-Op Labs  -Please document medical optimization for OR  -NPO after midnight except medications  -IVF while NPO  -Hold all chemical DVT prophylaxis after midnight  -SCDs  -Plan for OR tomorrow for IMN  -Dr. Anderson is aware and agrees with the above plan.    Dk Valladares D.O.  PGY-2 Orthopaedic Surgery Orthopaedic Surgery Consult Note    Pt is a 89y Female presenting with right hip pain s/p mechanical fall at nursing facility. Pt has dementia and does not remember event, furthermore patient cannot provide any history due to mental status. Attempted to call son Denis but was unable to reach him, multiple attempts by ED/EMS have been unsuccessful to reach family. Pt on coumadin for Afib.  PMHx:  RIGHT HIP PAIN    No pertinent family history in first degree relatives    MEWS Score    Hypertension    A-fib    CVA (cerebral vascular accident)    A-fib    CVA (cerebral vascular accident)    No significant past surgical history    No significant past surgical history    RIGHT HIP PAIN    21    SysAdmin_VisitLink      PSHx:    Allergies:  No Known Allergies    Medications:    Social: Denies smoking, EtOH, illicit drug use.    Labs:                        11.1   15.54 )-----------( 197      ( 03 Jan 2021 11:11 )             34.4     01-03    138  |  107  |  28<H>  ----------------------------<  158<H>  4.2   |  25  |  1.21    Ca    8.7      03 Jan 2021 11:11  Mg     2.0     01-03    TPro  7.5  /  Alb  3.0<L>  /  TBili  1.2  /  DBili  x   /  AST  34  /  ALT  34  /  AlkPhos  108  01-03    PT/INR - ( 03 Jan 2021 11:11 )   PT: 37.8 sec;   INR: 3.46 ratio         PTT - ( 03 Jan 2021 11:11 )  PTT:37.8 sec    Vitals:  T(C): 37.1 (01-03-21 @ 10:28), Max: 37.1 (01-03-21 @ 10:28)  HR: 60 (01-03-21 @ 10:28) (60 - 60)  BP: 147/80 (01-03-21 @ 10:28) (147/80 - 147/80)  RR: 16 (01-03-21 @ 10:28) (16 - 16)  SpO2: 97% (01-03-21 @ 10:28) (97% - 97%)    Physical Exam:  Gen: Confused, AxOx0    RLE:   Skin intact  No edema, erythema, ecchymosis  No gross deformity  Limb is shortened and externally rotated  No bony TTP of Knee/Foot/Toes  Unable to SLR  Tender with logroll/heelstrike  Unable to cooperate with motor/sensory exam but moving extremity spontaneously  +DP/PT Pulses  Compartments soft and compressible  No calf TTP B/L    Secondary Assessment:  NC/AT, NTTP of clavicles, NTTP of C-,T-,L-Spine, NTTP of Pelvis  UEs: NTTP of Shoulders, Elbows, Wrists, Hands; NT with AROM/PROM of Shoulders, Elbows, Wrists, Hands; AIN/PIN/Med/Uln/Msc/Rad/Ax intact  LLE: Able to SLR, NT with Log Roll, NT with Heel Strike, NTTP of Hip, Knee, Ankle, Foot; NT with AROM/PROM of Hip, Knee, Ankle, Foot; Q/H/Gsc/TA/EHL/FHL intact    Pt is a 89y Female with a R Intertrochanteric Hip Fracture.  -Admit to medicine  -Closed fracture, NV intact  -Pain control  -NWB affected extremity  -Bedrest  -FU Pre-Op Labs  -Please document medical optimization for OR  -NPO after midnight except medications  -IVF while NPO  -Hold all chemical DVT prophylaxis after midnight  -SCDs  -Plan for OR tomorrow for IMN  -Dr. Anderson is aware and agrees with the above plan.    Dk Valladares D.O.  PGY-2 Orthopaedic Surgery

## 2021-01-03 NOTE — ED ADULT NURSE REASSESSMENT NOTE - NS ED NURSE REASSESS COMMENT FT1
Pt remains bradycardic to 38. Saturation 77%on room air, hypotensive.   Bipap attempted patient cannot tolerate due to altered mental status, No9n rebreather initiated saturation to 95% RR 25.  Pt A&Ox1
RT states pt refused to wear bipap. Dr romero made aware.
Pt radu down to 30s HR. Dr. Reid made aware. Paged RT for bipap as per Dr scott. Pt placed on NRB 10ml.

## 2021-01-03 NOTE — ED PROVIDER NOTE - CLINICAL SUMMARY MEDICAL DECISION MAKING FREE TEXT BOX
Pt with hip fx, ortho aware. pt tachypneic and hypoxic, likely from CHF, will give lasix. EKG changed from prior, dr jefferson consulted. dr koch aware.

## 2021-01-03 NOTE — CONSULT NOTE ADULT - SUBJECTIVE AND OBJECTIVE BOX
CHIEF COMPLAINT:  Patient is a 89y old  Female who presents with a chief complaint of rt hip fracture (2021 13:30)      HPI:  She is a 89-year-old with hypertension, chronic atrial fibrillation, hypertension, heart failure, dementia, presents from nursing home after a fall and right hip pain. Diffuse no obvious chest pains, shortness of breath noted. Findings of hip fracture noted.    ALLERGIES:  No Known Allergies    Home Medications:  atorvastatin 40 mg oral tablet: 1 tab(s) orally once a day (at bedtime) (12 Dec 2020 22:26)  calcitonin 200 intl units/inh nasal spray: 1 spray(s) nasal once a day (12 Dec 2020 22:)  digoxin 125 mcg (0.125 mg) oral tablet: 1 tab(s) orally once a day (12 Dec 2020 22:)  dilTIAZem 360 mg/24 hours oral capsule, extended release: 1 cap(s) orally once a day (12 Dec 2020 22:26)  escitalopram 5 mg oral tablet: 1 tab(s) orally once a day (12 Dec 2020 22:26)  isosorbide mononitrate 60 mg oral tablet, extended release: 1 tab(s) orally once a day (12 Dec 2020 22:26)  metoprolol tartrate 25 mg oral tablet: 1 tab(s) orally 2 times a day (12 Dec 2020 22:26)  oxycodone-acetaminophen 5 mg-325 mg oral tablet: 1 tab(s) orally every 6 hours, As needed, Severe Pain (7 - 10) (12 Dec 2020 22:26)  polyethylene glycol 3350 oral powder for reconstitution: 17 gram(s) orally once a day, As needed, Constipation (12 Dec 2020 22:26)  warfarin 5 mg oral tablet: 1 tab(s) orally once a day (12 Dec 2020 22:26)    PAST MEDICAL & SURGICAL HISTORY:  Hypertension    A-fib  stable    CVA (cerebral vascular accident)  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    REVIEW OF SYSTEMS:  unable to assess.    PHYSICAL EXAM:  Vital Signs:  Vital Signs Last 24 Hrs  T(C): 36.7 (2021 17:11), Max: 37.1 (2021 10:28)  T(F): 98.1 (2021 17:11), Max: 98.8 (2021 10:28)  HR: 40 (2021 17:51) (40 - 64)  BP: 103/62 (2021 17:51) (103/62 - 147/80)  RR: 20 (2021 17:51) (15 - 20)  SpO2: 84% (2021 17:51) (84% - 97%)      Constitutional: well developed, normal appearance, well groomed, well nourished, no deformities and no acute distress.   Eyes: the conjunctiva exhibited no abnormalities and the eyelids demonstrated no xanthelasmas.   HEENT: normal oral mucosa, no oral pallor and no oral cyanosis.   Neck: normal jugular venous A waves present, normal jugular venous V waves present and no jugular venous briceno A waves.   Pulmonary: no respiratory distress, normal respiratory rhythm and effort, no accessory muscle use and lungs were clear to auscultation bilaterally.   Cardiovascular: heart rhythm is irregular, normal S1 and S2 and no murmur, gallop, rub, heave or thrill are present.   Abdomen: soft, non-tender  Musculoskeletal: the gait could not be assessed.   Extremities: no clubbing of the fingernails, no localized cyanosis, no petechial hemorrhages and no ischemic changes.   Skin: normal skin color and pigmentation, no rash, no venous stasis, no skin lesions, no skin ulcer and no xanthoma was observed.   Psychiatric: oriented to person, place, and time, the affect was normal, the mood was normal and not feeling anxious.      LABORATORY:                          11.1   15.54 )-----------( 197      ( 2021 11:11 )             34.4     01-03    138  |  107  |  28<H>  ----------------------------<  158<H>  4.2   |  25  |  1.21    Ca    8.7      2021 11:11  Mg     2.0         TPro  7.5  /  Alb  3.0<L>  /  TBili  1.2  /  DBili  x   /  AST  34  /  ALT  34  /  AlkPhos  108      ABG - ( 2021 14:44 )  pH, Arterial: x     pH, Blood: 7.45  /  pCO2: 36    /  pO2: <47   / HCO3: 25    / Base Excess: 1.8   /  SaO2: 77                CARDIAC MARKERS ( 2021 11:11 )  .254 ng/mL / x     / x     / x     / x            LIVER FUNCTIONS - ( 2021 11:11 )  Alb: 3.0 g/dL / Pro: 7.5 gm/dL / ALK PHOS: 108 U/L / ALT: 34 U/L / AST: 34 U/L / GGT: x           PT/INR - ( 2021 11:11 )   PT: 37.8 sec;   INR: 3.46 ratio         PTT - ( 2021 11:11 )  PTT:37.8 sec  Urinalysis Basic - ( 2021 13:04 )    Color: Yellow / Appearance: Clear / S.025 / pH: x  Gluc: x / Ketone: Negative  / Bili: Negative / Urobili: Negative mg/dL   Blood: x / Protein: 100 mg/dL / Nitrite: Negative   Leuk Esterase: Negative / RBC: x / WBC 3-5   Sq Epi: x / Non Sq Epi: x / Bacteria: Occasional      BNPSerum Pro-Brain Natriuretic Peptide: 4300 pg/mL (21 @ 11:11)      IMAGING:  < from: 12 Lead ECG (20 @ 23:02) >  Atrial fibrillation  Voltage criteria for left ventricular hypertrophy  ST & T wave abnormality, consider anterolateral ischemia  Abnormal ECG  When compared with ECG of 05-DEC-2020 17:49,  Inverted T waves have replaced nonspecific T wave abnormality in Anterolateral leads    < end of copied text >  < from: Xray Femur 2 Views, Right (21 @ 12:00) >  IMPRESSION: There is a trochanteric fracture of the right hip with varus angulation. There is mild degenerative change of the right hip. The distal femur is intact. Basilar calcification is present.    < end of copied text >    Prelim echo: Preserved biventricular size and systolic function. Moderate tricuspid insufficiency. Further report to follow.    ASSESSMENT:  She is a 89-year-old with hypertension, chronic atrial fibrillation, hypertension, heart failure, dementia, presents from nursing home after a fall and right hip pain. Diffuse no obvious chest pains, shortness of breath noted. Findings of hip fracture noted.  Elevated BNP and troponin likely from an element of mild heart failure preserved ejection fraction.     PLAN:       She will continue on diltiazem 300 mg daily plus digoxin 125 mcg daily and metoprolol 25 mg b.i.d. along with isosorbide 60 mg daily for heart rate and blood pressure control. Continue atorvastatin for lipid-lowering.   Hold off on warfarin at the present time with plans of operative repair of her hip. From her current clinical in cardiac standpoint, she may proceed ahead with her upcoming orthopedic repair with increased but acceptable risk given risk, benefits, and alternatives. Routine hemodynamic monitoring perioperatively is recommended. Would resume warfarin postoperatively as soon as possible for stroke risk reduction in the setting of atrial fibrillation. Given a dose of IV Lasix to help offset element of heart failure preserved ejection fraction.    Sundeep Gooden MD, FACC, DIA CUBA, FACP  Director, Heart Failure Services  [facility]  , Department of Cardiology  [facility]

## 2021-01-03 NOTE — ED PROVIDER NOTE - OBJECTIVE STATEMENT
89 year old female, from [facility] home w/PMH dementia HTN, afib, CHF, HLD, who presents to the ED today for right hip pain, s/p fall yesterday. Noted ecchymosis to the right hip. Pt is AO x 1. Pt per chart is not on any anticoagulation, likely fall risks. Pt with negative COVID 12/31. Denies urinary incontinence, hematuria, pain, headaches, CP, neck pain, dizziness, LOC, coughs, fevers, chills, generalized weakness, nausea, vomit, diarrhea, numbness, tingling, back pain, or SOB. 89 year old female, from [facility] w/PMH dementia HTN, afib, CHF, HLD, who presents to the ED today for right hip pain, s/p fall yesterday. Noted ecchymosis to the right hip. Pt is AO x 1.  per chart is not on any anticoagulation, though previously on coumadin, Pt with negative COVID 12/31. Of note, EMS, states pt was hypoxic when they arrived and started her on O2.  Denies urinary incontinence, hematuria, pain, headaches, CP, neck pain, dizziness, LOC, coughs, fevers, chills, generalized weakness, nausea, vomit, diarrhea, numbness, tingling, back pain, or SOB.

## 2021-01-03 NOTE — H&P ADULT - NSHPPHYSICALEXAM_GEN_ALL_CORE
General: A/ox 3, No acute Distress. dmented  skin : Normal  Head. Noreen. No lacerations  Neck: Supple, NO JVD  Cardiac: S1 S2, No M/R/G  Pulmonary: CTAP, Breathing unlabored, No Rhonchi/Rales/Wheezing  Abdomen: Soft, Non -tender, +BS x 4 quads  Neuro: A/o x 3, No focal deficits. Normal Motor and sensory exam  Vascular: Normal distal pulses.  Extremities: . No rashes. No edema. rt leg rotated  Decubiti ; None

## 2021-01-03 NOTE — ED ADULT NURSE NOTE - OBJECTIVE STATEMENT
Pt received at bed 14 BIB co fall. Pt received at bed 14 BIB co fall from [facility]. Pt presents with R hip pain, with noted ecchymosis down the legs. Pt has hx of dementia, disoriented. Pt on 4L NC satting 90%, as per Dr romero orders. Pt noted to be bradycardic. Dr Romero evaluated at bedside.  Labs drawn and sent. Will continue to monitor.

## 2021-01-03 NOTE — H&P ADULT - ASSESSMENT
fracture rt hip   CHF  a. fib on coumadin. INR 3.46  old cva   dementia   fractures of t4, T12, and L2  HTN

## 2021-01-03 NOTE — ED PROVIDER NOTE - CARE PLAN
Principal Discharge DX:	Hip fracture  Secondary Diagnosis:	CHF (congestive heart failure)  Secondary Diagnosis:	Elevated troponin  Secondary Diagnosis:	Abnormal EKG

## 2021-01-04 LAB
ALBUMIN SERPL ELPH-MCNC: 3 G/DL — LOW (ref 3.3–5)
ALP SERPL-CCNC: 100 U/L — SIGNIFICANT CHANGE UP (ref 40–120)
ALT FLD-CCNC: 33 U/L — SIGNIFICANT CHANGE UP (ref 12–78)
ANION GAP SERPL CALC-SCNC: 7 MMOL/L — SIGNIFICANT CHANGE UP (ref 5–17)
ANION GAP SERPL CALC-SCNC: 7 MMOL/L — SIGNIFICANT CHANGE UP (ref 5–17)
APPEARANCE UR: CLEAR — SIGNIFICANT CHANGE UP
APTT BLD: 25.9 SEC — LOW (ref 27.5–35.5)
APTT BLD: 29.3 SEC — SIGNIFICANT CHANGE UP (ref 27.5–35.5)
AST SERPL-CCNC: 48 U/L — HIGH (ref 15–37)
BACTERIA # UR AUTO: ABNORMAL
BASOPHILS # BLD AUTO: 0.01 K/UL — SIGNIFICANT CHANGE UP (ref 0–0.2)
BASOPHILS NFR BLD AUTO: 0.1 % — SIGNIFICANT CHANGE UP (ref 0–2)
BILIRUB SERPL-MCNC: 2.3 MG/DL — HIGH (ref 0.2–1.2)
BILIRUB UR-MCNC: NEGATIVE — SIGNIFICANT CHANGE UP
BUN SERPL-MCNC: 28 MG/DL — HIGH (ref 7–23)
BUN SERPL-MCNC: 37 MG/DL — HIGH (ref 7–23)
CALCIUM SERPL-MCNC: 8.8 MG/DL — SIGNIFICANT CHANGE UP (ref 8.5–10.1)
CALCIUM SERPL-MCNC: 8.8 MG/DL — SIGNIFICANT CHANGE UP (ref 8.5–10.1)
CHLORIDE SERPL-SCNC: 102 MMOL/L — SIGNIFICANT CHANGE UP (ref 96–108)
CHLORIDE SERPL-SCNC: 104 MMOL/L — SIGNIFICANT CHANGE UP (ref 96–108)
CK MB BLD-MCNC: 0.7 % — SIGNIFICANT CHANGE UP (ref 0–3.5)
CK MB CFR SERPL CALC: 4.4 NG/ML — HIGH (ref 0.5–3.6)
CK SERPL-CCNC: 620 U/L — HIGH (ref 26–192)
CO2 SERPL-SCNC: 26 MMOL/L — SIGNIFICANT CHANGE UP (ref 22–31)
CO2 SERPL-SCNC: 28 MMOL/L — SIGNIFICANT CHANGE UP (ref 22–31)
COLOR SPEC: YELLOW — SIGNIFICANT CHANGE UP
CREAT SERPL-MCNC: 1.27 MG/DL — SIGNIFICANT CHANGE UP (ref 0.5–1.3)
CREAT SERPL-MCNC: 1.76 MG/DL — HIGH (ref 0.5–1.3)
CULTURE RESULTS: NO GROWTH — SIGNIFICANT CHANGE UP
DIFF PNL FLD: ABNORMAL
EOSINOPHIL # BLD AUTO: 0.01 K/UL — SIGNIFICANT CHANGE UP (ref 0–0.5)
EOSINOPHIL NFR BLD AUTO: 0.1 % — SIGNIFICANT CHANGE UP (ref 0–6)
EPI CELLS # UR: SIGNIFICANT CHANGE UP
GLUCOSE SERPL-MCNC: 119 MG/DL — HIGH (ref 70–99)
GLUCOSE SERPL-MCNC: 161 MG/DL — HIGH (ref 70–99)
GLUCOSE UR QL: NEGATIVE MG/DL — SIGNIFICANT CHANGE UP
HCT VFR BLD CALC: 28.8 % — LOW (ref 34.5–45)
HCT VFR BLD CALC: 30.3 % — LOW (ref 34.5–45)
HGB BLD-MCNC: 10.1 G/DL — LOW (ref 11.5–15.5)
HGB BLD-MCNC: 9.6 G/DL — LOW (ref 11.5–15.5)
IMM GRANULOCYTES NFR BLD AUTO: 1 % — SIGNIFICANT CHANGE UP (ref 0–1.5)
INR BLD: 1.38 RATIO — HIGH (ref 0.88–1.16)
INR BLD: 1.89 RATIO — HIGH (ref 0.88–1.16)
KETONES UR-MCNC: NEGATIVE — SIGNIFICANT CHANGE UP
LACTATE SERPL-SCNC: 4.5 MMOL/L — CRITICAL HIGH (ref 0.7–2)
LEUKOCYTE ESTERASE UR-ACNC: ABNORMAL
LYMPHOCYTES # BLD AUTO: 0.82 K/UL — LOW (ref 1–3.3)
LYMPHOCYTES # BLD AUTO: 5 % — LOW (ref 13–44)
MAGNESIUM SERPL-MCNC: 1.8 MG/DL — SIGNIFICANT CHANGE UP (ref 1.6–2.6)
MCHC RBC-ENTMCNC: 31.1 PG — SIGNIFICANT CHANGE UP (ref 27–34)
MCHC RBC-ENTMCNC: 31.3 PG — SIGNIFICANT CHANGE UP (ref 27–34)
MCHC RBC-ENTMCNC: 33.3 GM/DL — SIGNIFICANT CHANGE UP (ref 32–36)
MCHC RBC-ENTMCNC: 33.3 GM/DL — SIGNIFICANT CHANGE UP (ref 32–36)
MCV RBC AUTO: 93.2 FL — SIGNIFICANT CHANGE UP (ref 80–100)
MCV RBC AUTO: 93.8 FL — SIGNIFICANT CHANGE UP (ref 80–100)
MONOCYTES # BLD AUTO: 1.41 K/UL — HIGH (ref 0–0.9)
MONOCYTES NFR BLD AUTO: 8.6 % — SIGNIFICANT CHANGE UP (ref 2–14)
NEUTROPHILS # BLD AUTO: 13.98 K/UL — HIGH (ref 1.8–7.4)
NEUTROPHILS NFR BLD AUTO: 85.2 % — HIGH (ref 43–77)
NITRITE UR-MCNC: NEGATIVE — SIGNIFICANT CHANGE UP
NRBC # BLD: 0 /100 WBCS — SIGNIFICANT CHANGE UP (ref 0–0)
NRBC # BLD: 0 /100 WBCS — SIGNIFICANT CHANGE UP (ref 0–0)
PH UR: 6.5 — SIGNIFICANT CHANGE UP (ref 5–8)
PHOSPHATE SERPL-MCNC: 2.9 MG/DL — SIGNIFICANT CHANGE UP (ref 2.5–4.5)
PLATELET # BLD AUTO: 197 K/UL — SIGNIFICANT CHANGE UP (ref 150–400)
PLATELET # BLD AUTO: 213 K/UL — SIGNIFICANT CHANGE UP (ref 150–400)
POTASSIUM SERPL-MCNC: 3.3 MMOL/L — LOW (ref 3.5–5.3)
POTASSIUM SERPL-MCNC: 4.1 MMOL/L — SIGNIFICANT CHANGE UP (ref 3.5–5.3)
POTASSIUM SERPL-SCNC: 3.3 MMOL/L — LOW (ref 3.5–5.3)
POTASSIUM SERPL-SCNC: 4.1 MMOL/L — SIGNIFICANT CHANGE UP (ref 3.5–5.3)
PROT SERPL-MCNC: 7.7 GM/DL — SIGNIFICANT CHANGE UP (ref 6–8.3)
PROT UR-MCNC: 30 MG/DL
PROTHROM AB SERPL-ACNC: 15.8 SEC — HIGH (ref 10.6–13.6)
PROTHROM AB SERPL-ACNC: 21.3 SEC — HIGH (ref 10.6–13.6)
RBC # BLD: 3.07 M/UL — LOW (ref 3.8–5.2)
RBC # BLD: 3.25 M/UL — LOW (ref 3.8–5.2)
RBC # FLD: 13.6 % — SIGNIFICANT CHANGE UP (ref 10.3–14.5)
RBC # FLD: 13.8 % — SIGNIFICANT CHANGE UP (ref 10.3–14.5)
RBC CASTS # UR COMP ASSIST: SIGNIFICANT CHANGE UP /HPF (ref 0–4)
SARS-COV-2 IGG SERPL QL IA: NEGATIVE — SIGNIFICANT CHANGE UP
SARS-COV-2 IGM SERPL IA-ACNC: 0.06 INDEX — SIGNIFICANT CHANGE UP
SODIUM SERPL-SCNC: 137 MMOL/L — SIGNIFICANT CHANGE UP (ref 135–145)
SODIUM SERPL-SCNC: 137 MMOL/L — SIGNIFICANT CHANGE UP (ref 135–145)
SP GR SPEC: 1.01 — SIGNIFICANT CHANGE UP (ref 1.01–1.02)
SPECIMEN SOURCE: SIGNIFICANT CHANGE UP
TROPONIN I SERPL-MCNC: 0.3 NG/ML — HIGH (ref 0.01–0.04)
TROPONIN I SERPL-MCNC: 0.71 NG/ML — HIGH (ref 0.01–0.04)
UROBILINOGEN FLD QL: NEGATIVE MG/DL — SIGNIFICANT CHANGE UP
VIT B12 SERPL-MCNC: 385 PG/ML — SIGNIFICANT CHANGE UP (ref 232–1245)
WBC # BLD: 13.51 K/UL — HIGH (ref 3.8–10.5)
WBC # BLD: 16.39 K/UL — HIGH (ref 3.8–10.5)
WBC # FLD AUTO: 13.51 K/UL — HIGH (ref 3.8–10.5)
WBC # FLD AUTO: 16.39 K/UL — HIGH (ref 3.8–10.5)
WBC UR QL: ABNORMAL

## 2021-01-04 PROCEDURE — 99291 CRITICAL CARE FIRST HOUR: CPT

## 2021-01-04 PROCEDURE — 93010 ELECTROCARDIOGRAM REPORT: CPT

## 2021-01-04 RX ORDER — DEXMEDETOMIDINE HYDROCHLORIDE IN 0.9% SODIUM CHLORIDE 4 UG/ML
0.2 INJECTION INTRAVENOUS
Qty: 200 | Refills: 0 | Status: DISCONTINUED | OUTPATIENT
Start: 2021-01-04 | End: 2021-01-05

## 2021-01-04 RX ORDER — OXYCODONE HYDROCHLORIDE 5 MG/1
5 TABLET ORAL EVERY 4 HOURS
Refills: 0 | Status: DISCONTINUED | OUTPATIENT
Start: 2021-01-04 | End: 2021-01-07

## 2021-01-04 RX ORDER — CEFAZOLIN SODIUM 1 G
2000 VIAL (EA) INJECTION EVERY 8 HOURS
Refills: 0 | Status: COMPLETED | OUTPATIENT
Start: 2021-01-04 | End: 2021-01-05

## 2021-01-04 RX ORDER — ASCORBIC ACID 60 MG
500 TABLET,CHEWABLE ORAL
Refills: 0 | Status: DISCONTINUED | OUTPATIENT
Start: 2021-01-04 | End: 2021-01-07

## 2021-01-04 RX ORDER — OXYCODONE HYDROCHLORIDE 5 MG/1
2.5 TABLET ORAL EVERY 4 HOURS
Refills: 0 | Status: DISCONTINUED | OUTPATIENT
Start: 2021-01-04 | End: 2021-01-07

## 2021-01-04 RX ORDER — SODIUM CHLORIDE 9 MG/ML
1000 INJECTION, SOLUTION INTRAVENOUS
Refills: 0 | Status: DISCONTINUED | OUTPATIENT
Start: 2021-01-04 | End: 2021-01-04

## 2021-01-04 RX ORDER — ATORVASTATIN CALCIUM 80 MG/1
40 TABLET, FILM COATED ORAL AT BEDTIME
Refills: 0 | Status: DISCONTINUED | OUTPATIENT
Start: 2021-01-04 | End: 2021-01-07

## 2021-01-04 RX ORDER — MAGNESIUM HYDROXIDE 400 MG/1
30 TABLET, CHEWABLE ORAL DAILY
Refills: 0 | Status: DISCONTINUED | OUTPATIENT
Start: 2021-01-04 | End: 2021-01-07

## 2021-01-04 RX ORDER — CHLORHEXIDINE GLUCONATE 213 G/1000ML
1 SOLUTION TOPICAL
Refills: 0 | Status: DISCONTINUED | OUTPATIENT
Start: 2021-01-04 | End: 2021-01-07

## 2021-01-04 RX ORDER — SODIUM CHLORIDE 9 MG/ML
1000 INJECTION, SOLUTION INTRAVENOUS
Refills: 0 | Status: DISCONTINUED | OUTPATIENT
Start: 2021-01-04 | End: 2021-01-05

## 2021-01-04 RX ORDER — MAGNESIUM SULFATE 500 MG/ML
2 VIAL (ML) INJECTION ONCE
Refills: 0 | Status: COMPLETED | OUTPATIENT
Start: 2021-01-04 | End: 2021-01-04

## 2021-01-04 RX ORDER — FOLIC ACID 0.8 MG
1 TABLET ORAL DAILY
Refills: 0 | Status: DISCONTINUED | OUTPATIENT
Start: 2021-01-04 | End: 2021-01-07

## 2021-01-04 RX ORDER — PROPOFOL 10 MG/ML
10 INJECTION, EMULSION INTRAVENOUS
Qty: 1000 | Refills: 0 | Status: DISCONTINUED | OUTPATIENT
Start: 2021-01-04 | End: 2021-01-04

## 2021-01-04 RX ORDER — WARFARIN SODIUM 2.5 MG/1
5 TABLET ORAL ONCE
Refills: 0 | Status: COMPLETED | OUTPATIENT
Start: 2021-01-04 | End: 2021-01-04

## 2021-01-04 RX ORDER — CHLORHEXIDINE GLUCONATE 213 G/1000ML
15 SOLUTION TOPICAL EVERY 12 HOURS
Refills: 0 | Status: DISCONTINUED | OUTPATIENT
Start: 2021-01-04 | End: 2021-01-04

## 2021-01-04 RX ORDER — ONDANSETRON 8 MG/1
4 TABLET, FILM COATED ORAL EVERY 6 HOURS
Refills: 0 | Status: DISCONTINUED | OUTPATIENT
Start: 2021-01-04 | End: 2021-01-07

## 2021-01-04 RX ORDER — ACETAMINOPHEN 500 MG
650 TABLET ORAL EVERY 6 HOURS
Refills: 0 | Status: DISCONTINUED | OUTPATIENT
Start: 2021-01-04 | End: 2021-01-07

## 2021-01-04 RX ORDER — POTASSIUM CHLORIDE 20 MEQ
10 PACKET (EA) ORAL
Refills: 0 | Status: COMPLETED | OUTPATIENT
Start: 2021-01-04 | End: 2021-01-04

## 2021-01-04 RX ADMIN — WARFARIN SODIUM 5 MILLIGRAM(S): 2.5 TABLET ORAL at 22:38

## 2021-01-04 RX ADMIN — DEXMEDETOMIDINE HYDROCHLORIDE IN 0.9% SODIUM CHLORIDE 3.01 MICROGRAM(S)/KG/HR: 4 INJECTION INTRAVENOUS at 21:27

## 2021-01-04 RX ADMIN — Medication 50 GRAM(S): at 22:33

## 2021-01-04 RX ADMIN — PROPOFOL 3.65 MICROGRAM(S)/KG/MIN: 10 INJECTION, EMULSION INTRAVENOUS at 19:17

## 2021-01-04 RX ADMIN — Medication 100 MILLIEQUIVALENT(S): at 23:14

## 2021-01-04 RX ADMIN — ATORVASTATIN CALCIUM 40 MILLIGRAM(S): 80 TABLET, FILM COATED ORAL at 22:38

## 2021-01-04 RX ADMIN — SODIUM CHLORIDE 50 MILLILITER(S): 9 INJECTION, SOLUTION INTRAVENOUS at 23:11

## 2021-01-04 RX ADMIN — Medication 100 MILLIEQUIVALENT(S): at 23:13

## 2021-01-04 RX ADMIN — Medication 100 MILLIGRAM(S): at 22:33

## 2021-01-04 RX ADMIN — Medication 100 MILLIEQUIVALENT(S): at 22:37

## 2021-01-04 NOTE — BRIEF OPERATIVE NOTE - COMMENTS
unable to extubate post op due to increased O2 requirements; transferred to ICU for monitoring and extubation attempt in the morning

## 2021-01-04 NOTE — DISCHARGE NOTE PROVIDER - CARE PROVIDER_API CALL
Sam Anderson (DO)  Orthopaedic Surgery  [address] [place] [address]  Phone: [phone]  Fax: [phone]  Follow Up Time: 2 weeks   Sam Anderson (DO)  Orthopaedic Surgery  [address] [place] [address]  Phone: [phone]  Fax: [phone]  Follow Up Time: 2 weeks    CAMERON FISCHER  [id number]  [address] [place] [id number]  Phone: [phone]  Fax: ()-  Follow Up Time:

## 2021-01-04 NOTE — PROGRESS NOTE ADULT - SUBJECTIVE AND OBJECTIVE BOX
Patient seen and examined post op.  Still intubated due to significant comorbid condition.          PE:  Intubated.  Dressing D/C/I  Vascular intact RLE  Compartments soft B/L LE

## 2021-01-04 NOTE — PROGRESS NOTE ADULT - SUBJECTIVE AND OBJECTIVE BOX
Pt seen and examined at bedside, resting comfortably. Unable to provide history or ROS due to mental status. Denies numbness/tingling, chest pain, SOB.    T(C): 36.6 (01-04-21 @ 05:03), Max: 37.1 (01-03-21 @ 10:28)  HR: 49 (01-04-21 @ 05:03) (36 - 64)  BP: 118/54 (01-04-21 @ 05:03) (96/48 - 147/80)  RR: 22 (01-04-21 @ 05:03) (13 - 25)  SpO2: 92% (01-04-21 @ 05:03) (84% - 97%)                          10.1   13.51 )-----------( 197      ( 04 Jan 2021 04:01 )             30.3     04 Jan 2021 04:01    137    |  104    |  37     ----------------------------<  119    4.1     |  26     |  1.76     Ca    8.8        04 Jan 2021 04:01  Mg     2.0       03 Jan 2021 11:11    TPro  7.5    /  Alb  3.0    /  TBili  1.2    /  DBili  x      /  AST  34     /  ALT  34     /  AlkPhos  108    03 Jan 2021 11:11      Physical Exam:  Gen: NAD, confused    RLE:   Skin intact  No edema, erythema, ecchymosis  No gross deformity  Limb is shortened and externally rotated  No bony TTP of Knee/Foot/Toes  Unable to SLR  Tender with logroll/heelstrike  Unable to cooperate with motor/sensory exam but moving extremity spontaneously  +DP/PT Pulses  Compartments soft and compressible  No calf TTP B/L    Pt is a 89y Female with a R Intertrochanteric Hip Fracture.  -Admitted to medicine  -Further attempts to contact family have been unsuccessful, will require 2 physician consent in OR prior to procedure  -Closed fracture, NV intact  -Pain control  -NWB affected extremity  -Bedrest  -Please document medical optimization for OR  -NPO w/ IVF  -Hold all chemical DVT prophylaxis after midnight  -SCDs  -Plan for OR today for IMN  -Dr. Anderson is aware and agrees with the above plan.    Dk Valladares D.O.  PGY-2 Orthopaedic Surgery

## 2021-01-04 NOTE — PROGRESS NOTE ADULT - ATTENDING COMMENTS
Patient seen and examined.  For surgical fixation of right hip fracture today.  The patient and the family aware of all other treatment option and possible complication and the recovery .

## 2021-01-04 NOTE — DISCHARGE NOTE PROVIDER - NSDCCPCAREPLAN_GEN_ALL_CORE_FT
PRINCIPAL DISCHARGE DIAGNOSIS  Diagnosis: Hip fracture  Assessment and Plan of Treatment:       SECONDARY DISCHARGE DIAGNOSES  Diagnosis: Abnormal EKG  Assessment and Plan of Treatment:     Diagnosis: Elevated troponin  Assessment and Plan of Treatment:     Diagnosis: CHF (congestive heart failure)  Assessment and Plan of Treatment:

## 2021-01-04 NOTE — PROGRESS NOTE ADULT - ASSESSMENT
S/P IM nail right right hip intertrochanteric fracture.    Plan:  PT/TTWBA RLE with crutches after extubation.  Pain management .  DVT prophylaxis  F/U labs  NV and compartments check RLE  ICE Rt hip.  Incentive spirometry.  May discharge to rehab after extubation when stable and cleared by medicine and follow as outpatient.

## 2021-01-04 NOTE — PROGRESS NOTE ADULT - SUBJECTIVE AND OBJECTIVE BOX
Pt seen and examined at bedside in ICU, brought to ICU for airway monitoring post op, currently awake and extubated, unable to obtain ROS due to baseline AMS    Vital Signs Last 24 Hrs  T(C): 36.7 (01-04-21 @ 19:32), Max: 37.1 (01-04-21 @ 18:31)  T(F): 98.1 (01-04-21 @ 19:32), Max: 98.7 (01-04-21 @ 18:31)  HR: 89 (01-04-21 @ 19:35) (84 - 117)  BP: 143/82 (01-04-21 @ 19:35) (143/82 - 186/95)  BP(mean): 93 (01-04-21 @ 19:35) (93 - 126)  RR: 20 (01-04-21 @ 19:35) (12 - 24)  SpO2: 97% (01-04-21 @ 19:35) (97% - 100%)    PE:  General: Pt Alert and oriented, NAD  DSG C/D/I  Pulses: Foot WWP; DP pulse 2+; Cap refill < 2 sec  Sensation: unable to obtain due to baseline AMS, withdraws from painful stimuli  Motor: EHL/FHL/TA/GS 5/5 grossly intact                          10.1   13.51 )-----------( 197      ( 04 Jan 2021 04:01 )             30.3     04 Jan 2021 04:01    137    |  104    |  37     ----------------------------<  119    4.1     |  26     |  1.76     Mg     2.0       03 Jan 2021 11:11    TPro  7.5    /  Alb  3.0    /  TBili  1.2    /  DBili  x      /  AST  34     /  ALT  34     /  AlkPhos  108    03 Jan 2021 11:11          A/P: 89yFemale s/p right hip IMN    - Pain Control  - AC: home dose warfarin given post op, ok to start AC per primary team  - Post op abx: Ancef 2g Q8H x 2 doses  - WBAT  - monitor respiratory status  - medical and ICU management appreciated  - PT: pending PT eval  - FU AM labs

## 2021-01-04 NOTE — BRIEF OPERATIVE NOTE - NSICDXBRIEFPROCEDURE_GEN_ALL_CORE_FT
PROCEDURES:  Insertion of locking intramedullary sky into right hip 04-Jan-2021 18:41:23  Dk Valladares

## 2021-01-04 NOTE — DISCHARGE NOTE PROVIDER - NSDCMRMEDTOKEN_GEN_ALL_CORE_FT
atorvastatin 40 mg oral tablet: 1 tab(s) orally once a day (at bedtime)  calcitonin 200 intl units/inh nasal spray: 1 spray(s) nasal once a day  digoxin 125 mcg (0.125 mg) oral tablet: 1 tab(s) orally once a day  dilTIAZem 360 mg/24 hours oral capsule, extended release: 1 cap(s) orally once a day  escitalopram 5 mg oral tablet: 1 tab(s) orally once a day  isosorbide mononitrate 60 mg oral tablet, extended release: 1 tab(s) orally once a day  metoprolol tartrate 25 mg oral tablet: 1 tab(s) orally 2 times a day  oxycodone-acetaminophen 5 mg-325 mg oral tablet: 1 tab(s) orally every 6 hours, As needed, Severe Pain (7 - 10)  polyethylene glycol 3350 oral powder for reconstitution: 17 gram(s) orally once a day, As needed, Constipation  TLSO Brace: TLSO Brace  To Be Worn With Ambulation Only  Dx: Inability to Ambulate  ICD10: R26.2  warfarin 5 mg oral tablet: 1 tab(s) orally once a day   ascorbic acid 500 mg oral tablet: 1 tab(s) orally 2 times a day  atorvastatin 40 mg oral tablet: 1 tab(s) orally once a day (at bedtime)  calcitonin 200 intl units/inh nasal spray: 1 spray(s) nasal once a day  digoxin 125 mcg (0.125 mg) oral tablet: 1 tab(s) orally once a day  dilTIAZem 360 mg/24 hours oral capsule, extended release: 1 cap(s) orally once a day  escitalopram 5 mg oral tablet: 1 tab(s) orally once a day  folic acid 1 mg oral tablet: 1 tab(s) orally once a day  isosorbide mononitrate 60 mg oral tablet, extended release: 1 tab(s) orally once a day  metoprolol tartrate 25 mg oral tablet: 1 tab(s) orally 2 times a day  Multiple Vitamins oral tablet: 1 tab(s) orally once a day  polyethylene glycol 3350 oral powder for reconstitution: 17 gram(s) orally once a day, As needed, Constipation  warfarin 5 mg oral tablet: 1 tab(s) orally once a day

## 2021-01-04 NOTE — PROGRESS NOTE ADULT - SUBJECTIVE AND OBJECTIVE BOX
Patient is a 89y old  Female who presents with a chief complaint of rt hip fracture (2021 06:18)  vitals stable   no distress   INR 1.89.   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    No significant past surgical history        MEDICATIONS  (STANDING):  atorvastatin 40 milliGRAM(s) Oral at bedtime  polyethylene glycol 3350 17 Gram(s) Oral daily  senna 2 Tablet(s) Oral at bedtime    MEDICATIONS  (PRN):  magnesium hydroxide Suspension 30 milliLiter(s) Oral daily PRN Constipation  oxycodone    5 mG/acetaminophen 325 mG 1 Tablet(s) Oral every 6 hours PRN Mild Pain (1 - 3)      Allergies    No Known Allergies    Intolerances        REVIEW OF SYSTEMS:  CONSTITUTIONAL: No fever, weight loss, or fatigue  EYES: No eye pain, visual disturbances, or discharge  ENMT:  No difficulty hearing, tinnitus, vertigo; No sinus or throat pain  NECK: No pain or stiffness  BREASTS: No pain, masses, or nipple discharge  RESPIRATORY: No cough, wheezing, chills or hemoptysis; No shortness of breath  CARDIOVASCULAR: No chest pain, palpitations, dizziness, or leg swelling  GASTROINTESTINAL: No abdominal or epigastric pain. No nausea, vomiting, or hematemesis; No diarrhea or constipation. No melena or hematochezia.  GENITOURINARY: No dysuria, frequency, hematuria, or incontinence  NEUROLOGICAL: No headaches, memory loss, loss of strength, numbness, or tremors  SKIN: No itching, burning, rashes, or lesions   LYMPH NODES: No enlarged glands  ENDOCRINE: No heat or cold intolerance; No hair loss  MUSCULOSKELETAL: No joint pain or swelling; No muscle, back, or extremity pain  PSYCHIATRIC: No depression, anxiety, mood swings, or difficulty sleeping  HEME/LYMPH: No easy bruising, or bleeding gums  ALLERY AND IMMUNOLOGIC: No hives or eczema    Vital Signs Last 24 Hrs  T(C): 36.6 (2021 05:03), Max: 37.1 (2021 10:28)  T(F): 97.8 (2021 05:03), Max: 98.8 (2021 10:28)  HR: 49 (2021 05:03) (36 - 64)  BP: 118/54 (2021 05:03) (96/48 - 147/80)  BP(mean): --  RR: 22 (2021 05:03) (13 - 25)  SpO2: 92% (2021 05:03) (84% - 97%)    PHYSICAL EXAM:  GENERAL: NAD, well-groomed, well-developed  HEAD:  Atraumatic, Normocephalic  EYES: EOMI, PERRLA, conjunctiva and sclera clear  ENMT: No tonsillar erythema, exudates, or enlargement; Moist mucous membranes, Good dentition, No lesions  NECK: Supple, No JVD, Normal thyroid  NERVOUS SYSTEM:  Alert & Oriented X3, Good concentration; Motor Strength 5/5 B/L upper and lower extremities; DTRs 2+ intact and symmetric  CHEST/LUNG: Clear to percussion bilaterally; No rales, rhonchi, wheezing, or rubs  HEART: IrRegular rate and rhythm; No murmurs, rubs, or gallops  ABDOMEN: Soft, Nontender, Nondistended; Bowel sounds present  EXTREMITIES:  2+ Peripheral Pulses, No clubbing, cyanosis, or edema  LYMPH: No lymphadenopathy noted  SKIN: No rashes or lesions    LABS:                        10.1   13.51 )-----------( 197      ( 2021 04:01 )             30.3     01-04    137  |  104  |  37<H>  ----------------------------<  119<H>  4.1   |  26  |  1.76<H>    Ca    8.8      2021 04:01  Mg     2.0     01-03    TPro  7.5  /  Alb  3.0<L>  /  TBili  1.2  /  DBili  x   /  AST  34  /  ALT  34  /  AlkPhos  108  01-03      PT/INR - ( 2021 04:01 )   PT: 21.3 sec;   INR: 1.89 ratio         PTT - ( 2021 04:01 )  PTT:29.3 sec  Urinalysis Basic - ( 2021 13:04 )    Color: Yellow / Appearance: Clear / S.025 / pH: x  Gluc: x / Ketone: Negative  / Bili: Negative / Urobili: Negative mg/dL   Blood: x / Protein: 100 mg/dL / Nitrite: Negative   Leuk Esterase: Negative / RBC: x / WBC 3-5   Sq Epi: x / Non Sq Epi: x / Bacteria: Occasional      CAPILLARY BLOOD GLUCOSE        ABG - ( 2021 14:44 )  pH, Arterial: x     pH, Blood: 7.45  /  pCO2: 36    /  pO2: <47   / HCO3: 25    / Base Excess: 1.8   /  SaO2: 77                CARDIAC MARKERS ( 2021 04:01 )  .300 ng/mL / x     / x     / x     / x      CARDIAC MARKERS ( 2021 11:11 )  .254 ng/mL / x     / x     / x     / x                RADIOLOGY & ADDITIONAL TESTS:    Imaging Personally Reviewed:  [ ] YES  [ ] NO    Consultant(s) Notes Reviewed:  [ ] YES  [ ] NO    Care Discussed with Consultants/Other Providers [ ] YES  [ ] NO    Care discussed with family,         [  ]   yes  [  ]  No    imp:    stable[ ]    unstable[  ]     improving [   ]       unchanged  [x ]                Plans:  Continue present plans  [ x ]  as per ortho               New consult [  ]   specialty  .......               order test[  ]    test name.  labs in AM                Discharge Planning  [  ]

## 2021-01-04 NOTE — PROGRESS NOTE ADULT - ASSESSMENT
rt hip fx   HTn   A. fib elevated INR 1.89    Recommend  1 unit of FFP perioperatively to reduce the risk of bleeding. Patient is considered optimized  as Surgery is semi emergent in nature.  Restart coumadin ASAP at Surgeon's discretion.  rt hip fx   HTn   A. fib elevated INR 1.89   Severe Pulmonary  Hypertension; Nomal LVEF  multiple fractures of vertebrae(chronic)       Recommend  1 unit of FFP perioperatively to reduce the risk of bleeding. Patient is considered optimized  as Surgery is semi emergent in nature.  Restart coumadin ASAP at Surgeon's discretion.

## 2021-01-04 NOTE — DISCHARGE NOTE PROVIDER - PROVIDER TOKENS
PROVIDER:[TOKEN:[[id number]],FOLLOWUP:[2 weeks]] PROVIDER:[TOKEN:[[id number]],FOLLOWUP:[2 weeks]],PROVIDER:[TOKEN:[[id number]]]

## 2021-01-04 NOTE — CONSULT NOTE ADULT - ASSESSMENT
89 year old female, from [facility] w/PMH dementia HTN, afib, CHF, HLD who presents to the ED after mechanical fall. Noted to have R intertrochanteric hip fracture. Patient is now POD 0 R hip IM nailing. Remains intubated post operatively for poor ABG and hypoxia prior to OR case. ICU consulted for ventilator management.     Neuro:  -- Remains sedated on Propofol at this time  -- Wean sedation for SBT in AM   -- Continue to monitor    Cardio:  -- History of Afib controlled at this time   -- Continue with cardiac meds with hold parameters  -- Repeat Troponin at this time. Cardiology (Dr. Gooden) following    Resp:  -- PRVC 10/500/50/5  -- Weaning protocols  -- Titrate FIO2 as tolerated  -- SBT daily     GI:   -- NPO for now  -- GI prophylaxis     Renal:  -- ROBERTO CARLOS on admission  -- Bradley placed. Strict I/Os     Infectious Disease:  -- Continue with Ancef at this time  -- Monitor fever curve     Heme:  -- Afib on Coumadin outpatient  -- Will resume A/C in AM per medicine team     Endocrine:  -- FS Q6   -- RISS     Disposition:  -- Admit to the ICU

## 2021-01-04 NOTE — CONSULT NOTE ADULT - SUBJECTIVE AND OBJECTIVE BOX
89 year old female, from [facility] home w/PMH dementia HTN, afib, CHF, HLD, who presents to the ED today for right hip pain, s/p fall yesterday. Noted ecchymosis to the right hip. Pt is AO x 1. Pt per chart is not on any anticoagulation, likely fall risks. Pt with negative COVID 12/31. Denies urinary incontinence, hematuria, pain, headaches, CP, neck pain, dizziness, LOC, coughs, fevers, chills, generalized weakness, nausea, vomit, diarrhea, numbness, tingling, back pain, or SOB.    Patient admitted to the medicine service. Noted to have fracture of the R Intertrochanteric fracture of the hip. Consulted by Ortho team and is now POD 0 s/p R hip IM nailing. Patient with worsening hypoxia prior to OR case which required NRB to maintain saturation. Remains intubated post operatively at this time. ICU consulted for ventilator management.     REVIEW OF SYSTEMS: Unable to obtain as patient on ventilator     ICU Vital Signs Last 24 Hrs  T(C): 37.2 (04 Jan 2021 13:35), Max: 37.2 (04 Jan 2021 13:35)  T(F): 98.9 (04 Jan 2021 13:35), Max: 98.9 (04 Jan 2021 13:35)  HR: 65 (04 Jan 2021 13:35) (36 - 65)  BP: 152/75 (04 Jan 2021 13:35) (96/48 - 152/75)  RR: 24 (04 Jan 2021 13:35) (13 - 27)  SpO2: 99% (04 Jan 2021 13:35) (91% - 99%)    PHYSICAL EXAM:  GENERAL: NAD, well-groomed, well-developed  HEAD:  Atraumatic, Normocephalic  EYES: EOMI, PERRLA, conjunctiva and sclera clear  ENMT: No tonsillar erythema, exudates, or enlargement; Moist mucous membranes, Good dentition, No lesions  NECK: Supple, No JVD, Normal thyroid  NERVOUS SYSTEM:  Alert & Oriented X3, Good concentration; Motor Strength 5/5 B/L upper and lower extremities; DTRs 2+ intact and symmetric  CHEST/LUNG: Clear to percussion bilaterally; No rales, rhonchi, wheezing, or rubs  HEART: Regular rate and rhythm; No murmurs, rubs, or gallops  ABDOMEN: Soft, Nontender, Nondistended; Bowel sounds present  EXTREMITIES:  2+ Peripheral Pulses, No clubbing, cyanosis, or edema  LYMPH: No lymphadenopathy noted  SKIN: No rashes or lesions

## 2021-01-04 NOTE — AIRWAY REMOVAL NOTE  ADULT & PEDS - ARTIFICAL AIRWAY REMOVAL COMMENTS
Extubated without incident, leak test positive, no stridor post extubation. Patient remains comfortable post extubation

## 2021-01-04 NOTE — CONSULT NOTE ADULT - ATTENDING COMMENTS
Agree with above.  Transferred to ICU from OR post-op.  Underwent SAT / SBT successfully, and was extubated shortly after arrival.  Rest of care per surgery.    I have personally provided 35 minutes of critical care time.

## 2021-01-04 NOTE — DISCHARGE NOTE PROVIDER - HOSPITAL COURSE
HPI Objective Statement: 89 year old female, from [facility] home w/PMH dementia HTN, afib, CHF, HLD, who presents to the ED today for right hip pain, s/p fall 1/2/21, right hip and pelvic film's showing right trochanter fracture. orthopedics' consulted and presurgical risk stratification with 2d echo performed grade 3 diastolic failure, moderate tr, elevated pulmonary artery pressures of 70 . right femur  Intramedullary nail placed 1/4/21 .pt was intubated p ost procedure and  monitored in MICU .Pt  extubated 1/4  and transferred to medical team 1/5 .  HPI Objective Statement: 89 year old female, from [facility] w/PMH dementia HTN, afib, CHF, HLD, who presents to the ED today for right hip pain, s/p fall 1/2/21, right hip and pelvic film's showing right trochanter fracture. orthopedics' consulted and presurgical risk stratification with 2d echo performed grade 3 diastolic failure, moderate tr, elevated pulmonary artery pressures of 70 . right femur Intramedullary nail placed 1/4/21. Pt was intubated post procedure and  monitored in MICU. Pt extubated 1/4  and transferred to medical team 1/5. Now pt is POD3 for R femur IMN. Patient seen and evaluated by Dr. Keller who deems patient medically stable for discharge to short term rehab.  HPI Objective Statement: 89 year old female, from [facility] w/PMH dementia HTN, afib, CHF, HLD, who presents to the ED today for right hip pain, s/p fall 1/2/21, right hip and pelvic film's showing right trochanter fracture. orthopedics' consulted and presurgical risk stratification with 2d echo performed grade 3 diastolic failure, moderate tr, elevated pulmonary artery pressures of 70 . right femur Intramedullary nail placed 1/4/21. Pt was intubated post procedure and  monitored in MICU. Pt extubated 1/4  and transferred to medical team 1/5. Now pt is POD3 for R femur IMN. Patient seen and evaluated by Dr. Keller who deems patient medically stable for discharge to short term rehab. To continue coumadin 5mg daily and check PT/INR 2 times weekly.

## 2021-01-04 NOTE — DISCHARGE NOTE PROVIDER - NSDCFUADDINST_GEN_ALL_CORE_FT
IM Nail DC Instructions:    1. ACTIVITY: Weight Bearing as Tolerated with assistance and rolling walker  2. DVT:Continue DVT/PE Prophylaxis. See Med Rec for Duration and dose.  3. PT: daily  4. FOLLOW UP: Orthopedic Surgeon Dr. Anderson in 14 Days. Call Office For Appointment.  5. STAPLES: Remove at Rehab by RN POD14   6. BANDAGE: Change bandage on POD7 to dry gauze and tegaderm or paper tape. Can also change PRN if saturated. Do NOT remove on arrival to inspect wound.

## 2021-01-05 LAB
ANION GAP SERPL CALC-SCNC: 7 MMOL/L — SIGNIFICANT CHANGE UP (ref 5–17)
BUN SERPL-MCNC: 27 MG/DL — HIGH (ref 7–23)
CALCIUM SERPL-MCNC: 8.8 MG/DL — SIGNIFICANT CHANGE UP (ref 8.5–10.1)
CHLORIDE SERPL-SCNC: 103 MMOL/L — SIGNIFICANT CHANGE UP (ref 96–108)
CO2 SERPL-SCNC: 28 MMOL/L — SIGNIFICANT CHANGE UP (ref 22–31)
CREAT SERPL-MCNC: 1.08 MG/DL — SIGNIFICANT CHANGE UP (ref 0.5–1.3)
CULTURE RESULTS: SIGNIFICANT CHANGE UP
GLUCOSE SERPL-MCNC: 128 MG/DL — HIGH (ref 70–99)
HCT VFR BLD CALC: 29.1 % — LOW (ref 34.5–45)
HGB BLD-MCNC: 9.6 G/DL — LOW (ref 11.5–15.5)
INR BLD: 1.16 RATIO — SIGNIFICANT CHANGE UP (ref 0.88–1.16)
LACTATE SERPL-SCNC: 2.1 MMOL/L — HIGH (ref 0.7–2)
MAGNESIUM SERPL-MCNC: 2.4 MG/DL — SIGNIFICANT CHANGE UP (ref 1.6–2.6)
MCHC RBC-ENTMCNC: 31.3 PG — SIGNIFICANT CHANGE UP (ref 27–34)
MCHC RBC-ENTMCNC: 33 GM/DL — SIGNIFICANT CHANGE UP (ref 32–36)
MCV RBC AUTO: 94.8 FL — SIGNIFICANT CHANGE UP (ref 80–100)
NRBC # BLD: 0 /100 WBCS — SIGNIFICANT CHANGE UP (ref 0–0)
PHOSPHATE SERPL-MCNC: 2.1 MG/DL — LOW (ref 2.5–4.5)
PLATELET # BLD AUTO: 205 K/UL — SIGNIFICANT CHANGE UP (ref 150–400)
POTASSIUM SERPL-MCNC: 3.7 MMOL/L — SIGNIFICANT CHANGE UP (ref 3.5–5.3)
POTASSIUM SERPL-SCNC: 3.7 MMOL/L — SIGNIFICANT CHANGE UP (ref 3.5–5.3)
PROCALCITONIN SERPL-MCNC: 0.3 NG/ML — HIGH (ref 0.02–0.1)
PROTHROM AB SERPL-ACNC: 13.4 SEC — SIGNIFICANT CHANGE UP (ref 10.6–13.6)
RBC # BLD: 3.07 M/UL — LOW (ref 3.8–5.2)
RBC # FLD: 13.4 % — SIGNIFICANT CHANGE UP (ref 10.3–14.5)
SODIUM SERPL-SCNC: 138 MMOL/L — SIGNIFICANT CHANGE UP (ref 135–145)
SPECIMEN SOURCE: SIGNIFICANT CHANGE UP
TROPONIN I SERPL-MCNC: 0.63 NG/ML — HIGH (ref 0.01–0.04)
WBC # BLD: 14.03 K/UL — HIGH (ref 3.8–10.5)
WBC # FLD AUTO: 14.03 K/UL — HIGH (ref 3.8–10.5)

## 2021-01-05 RX ORDER — POTASSIUM PHOSPHATE, MONOBASIC POTASSIUM PHOSPHATE, DIBASIC 236; 224 MG/ML; MG/ML
15 INJECTION, SOLUTION INTRAVENOUS ONCE
Refills: 0 | Status: COMPLETED | OUTPATIENT
Start: 2021-01-05 | End: 2021-01-05

## 2021-01-05 RX ORDER — INFLUENZA VIRUS VACCINE 15; 15; 15; 15 UG/.5ML; UG/.5ML; UG/.5ML; UG/.5ML
0.5 SUSPENSION INTRAMUSCULAR ONCE
Refills: 0 | Status: DISCONTINUED | OUTPATIENT
Start: 2021-01-05 | End: 2021-01-07

## 2021-01-05 RX ORDER — WARFARIN SODIUM 2.5 MG/1
5 TABLET ORAL ONCE
Refills: 0 | Status: COMPLETED | OUTPATIENT
Start: 2021-01-05 | End: 2021-01-05

## 2021-01-05 RX ORDER — ACETAMINOPHEN 500 MG
1000 TABLET ORAL ONCE
Refills: 0 | Status: COMPLETED | OUTPATIENT
Start: 2021-01-05 | End: 2021-01-05

## 2021-01-05 RX ADMIN — Medication 500 MILLIGRAM(S): at 01:06

## 2021-01-05 RX ADMIN — Medication 500 MILLIGRAM(S): at 17:44

## 2021-01-05 RX ADMIN — Medication 1 MILLIGRAM(S): at 11:44

## 2021-01-05 RX ADMIN — OXYCODONE HYDROCHLORIDE 2.5 MILLIGRAM(S): 5 TABLET ORAL at 12:41

## 2021-01-05 RX ADMIN — WARFARIN SODIUM 5 MILLIGRAM(S): 2.5 TABLET ORAL at 22:07

## 2021-01-05 RX ADMIN — POTASSIUM PHOSPHATE, MONOBASIC POTASSIUM PHOSPHATE, DIBASIC 62.5 MILLIMOLE(S): 236; 224 INJECTION, SOLUTION INTRAVENOUS at 10:10

## 2021-01-05 RX ADMIN — OXYCODONE HYDROCHLORIDE 5 MILLIGRAM(S): 5 TABLET ORAL at 01:31

## 2021-01-05 RX ADMIN — Medication 500 MILLIGRAM(S): at 05:50

## 2021-01-05 RX ADMIN — Medication 1000 MILLIGRAM(S): at 03:55

## 2021-01-05 RX ADMIN — OXYCODONE HYDROCHLORIDE 5 MILLIGRAM(S): 5 TABLET ORAL at 01:07

## 2021-01-05 RX ADMIN — Medication 400 MILLIGRAM(S): at 03:26

## 2021-01-05 RX ADMIN — Medication 100 MILLIGRAM(S): at 05:50

## 2021-01-05 RX ADMIN — Medication 1 MILLIGRAM(S): at 01:06

## 2021-01-05 RX ADMIN — Medication 1 TABLET(S): at 01:06

## 2021-01-05 RX ADMIN — Medication 1 TABLET(S): at 11:44

## 2021-01-05 RX ADMIN — CHLORHEXIDINE GLUCONATE 1 APPLICATION(S): 213 SOLUTION TOPICAL at 07:00

## 2021-01-05 RX ADMIN — CHLORHEXIDINE GLUCONATE 1 APPLICATION(S): 213 SOLUTION TOPICAL at 01:06

## 2021-01-05 RX ADMIN — OXYCODONE HYDROCHLORIDE 2.5 MILLIGRAM(S): 5 TABLET ORAL at 11:41

## 2021-01-05 NOTE — PHYSICAL THERAPY INITIAL EVALUATION ADULT - BALANCE TRAINING, PT EVAL
pt will increase sitting static and dynamic balance by full grade for safety with ambulation, ADLs and transfers x8 weeks, standing TBA

## 2021-01-05 NOTE — OCCUPATIONAL THERAPY INITIAL EVALUATION ADULT - BED MOBILITY TRAINING, PT EVAL
Patient will be able to perform bed mobility tasks independently, using least restrictive device, within 8 weeks.

## 2021-01-05 NOTE — PHYSICAL THERAPY INITIAL EVALUATION ADULT - GENERAL OBSERVATIONS, REHAB EVAL
Pt encountered supine, alert and oriented x0, primafit, nasal canula, iv in place, NAD. Pt is POD#1 R hip IMN, WBAT.

## 2021-01-05 NOTE — PROGRESS NOTE ADULT - SUBJECTIVE AND OBJECTIVE BOX
Patient is a 89y old  Female who presents with a chief complaint of rt hip fracture (2021 06:02)  patient extubated   hemodynami.   patient restrted on coumdin today for a. 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 1000 milliLiter(s) (50 mL/Hr) IV Continuous <Continuous>  multivitamin 1 Tablet(s) Oral daily  warfarin 5 milliGRAM(s) Oral once    MEDICATIONS  (PRN):  acetaminophen   Tablet .. 650 milliGRAM(s) Oral every 6 hours PRN Mild Pain (1 - 3)  magnesium hydroxide Suspension 30 milliLiter(s) Oral daily PRN Constipation  ondansetron Injectable 4 milliGRAM(s) IV Push every 6 hours PRN Nausea and/or Vomiting  oxyCODONE    IR 5 milliGRAM(s) Oral every 4 hours PRN Severe Pain (7 - 10)  oxyCODONE    IR 2.5 milliGRAM(s) Oral every 4 hours PRN Mild Pain (1 - 3)      Allergies    No Known Allergies    Intolerances        REVIEW OF SYSTEMS:  CONSTITUTIONAL: No fever, weight loss, or fatigue  EYES: No eye pain, visual disturbances, or discharge  ENMT:  No difficulty hearing, tinnitus, vertigo; No sinus or throat pain  NECK: No pain or stiffness  BREASTS: No pain, masses, or nipple discharge  RESPIRATORY: No cough, wheezing, chills or hemoptysis; No shortness of breath  CARDIOVASCULAR: No chest pain, palpitations, dizziness, or leg swelling  GASTROINTESTINAL: No abdominal or epigastric pain. No nausea, vomiting, or hematemesis; No diarrhea or constipation. No melena or hematochezia.  GENITOURINARY: No dysuria, frequency, hematuria, or incontinence  NEUROLOGICAL: No headaches, memory loss, loss of strength, numbness, or tremors  SKIN: No itching, burning, rashes, or lesions   LYMPH NODES: No enlarged glands  ENDOCRINE: No heat or cold intolerance; No hair loss  MUSCULOSKELETAL: No joint pain or swelling; No muscle, back, or extremity pain  PSYCHIATRIC: No depression, anxiety, mood swings, or difficulty sleeping  HEME/LYMPH: No easy bruising, or bleeding gums  ALLERY AND IMMUNOLOGIC: No hives or eczema    Vital Signs Last 24 Hrs  T(C): 37.2 (2021 07:15), Max: 37.6 (2021 04:30)  T(F): 98.9 (2021 07:15), Max: 99.6 (2021 04:30)  HR: 85 (2021 11:00) (62 - 117)  BP: 147/78 (2021 11:00) (103/59 - 186/95)  BP(mean): 94 (2021 11:00) (65 - 126)  RR: 22 (2021 11:00) (12 - 32)  SpO2: 97% (2021 11:00) (90% - 100%)    PHYSICAL EXAM:  GENERAL: NAD, well-groomed, well-developed  HEAD:  Atraumatic, Normocephalic  EYES: EOMI, PERRLA, conjunctiva and sclera clear  ENMT: No tonsillar erythema, exudates, or enlargement; Moist mucous membranes, Good dentition, No lesions  NECK: Supple, No JVD, Normal thyroid  NERVOUS SYSTEM:  Alert & Oriented X3, Good concentration; Motor Strength 5/5 B/L upper and lower extremities; DTRs 2+ intact and symmetric  CHEST/LUNG: Clear to percussion bilaterally; No rales, rhonchi, wheezing, or rubs  HEART: Regular rate and rhythm; No murmurs, rubs, or gallops  ABDOMEN: Soft, Nontender, Nondistended; Bowel sounds present  EXTREMITIES:  2+ Peripheral Pulses, No clubbing, cyanosis, or edema  LYMPH: No lymphadenopathy noted  SKIN: No rashes or lesions    LABS:                        9.6    14.03 )-----------( 205      ( 2021 04:15 )             29.1     01-05    138  |  103  |  27<H>  ----------------------------<  128<H>  3.7   |  28  |  1.08    Ca    8.8      2021 04:15  Phos  2.1     01-05  Mg     2.4     01-05    TPro  7.7  /  Alb  3.0<L>  /  TBili  2.3<H>  /  DBili  x   /  AST  48<H>  /  ALT  33  /  AlkPhos  100  01-04      PT/INR - ( 2021 04:15 )   PT: 13.4 sec;   INR: 1.16 ratio         PTT - ( 2021 12:39 )  PTT:25.9 sec  Urinalysis Basic - ( 2021 21:09 )    Color: Yellow / Appearance: Clear / S.010 / pH: x  Gluc: x / Ketone: Negative  / Bili: Negative / Urobili: Negative mg/dL   Blood: x / Protein: 30 mg/dL / Nitrite: Negative   Leuk Esterase: Small / RBC: 0-2 /HPF / WBC 6-10   Sq Epi: x / Non Sq Epi: Occasional / Bacteria: Occasional      CAPILLARY BLOOD GLUCOSE        ABG - ( 2021 14:44 )  pH, Arterial: x     pH, Blood: 7.45  /  pCO2: 36    /  pO2: <47   / HCO3: 25    / Base Excess: 1.8   /  SaO2: 77                CARDIAC MARKERS ( 2021 04:15 )  .628 ng/mL / x     / x     / x     / x      CARDIAC MARKERS ( 2021 21:08 )  .712 ng/mL / x     / 620 U/L / x     / 4.4 ng/mL  CARDIAC MARKERS ( 2021 04:01 )  .300 ng/mL / x     / x     / x     / x                RADIOLOGY & ADDITIONAL TESTS:    Imaging Personally Reviewed:  [ ] YES  [ ] NO    Consultant(s) Notes Reviewed:  [ ] YES  [ ] NO    Care Discussed with Consultants/Other Providers [ ] YES  [ ] NO    Care discussed with family,         [  ]   yes  [  ]  No    imp:    stable[ ]    unstable[  ]     improving [   ]       unchanged  [  ]                Plans:  Continue present plans  [  ]               New consult [  ]   specialty  .......               order test[  ]    test name.                  Discharge Planning  [  ]

## 2021-01-05 NOTE — PROGRESS NOTE ADULT - SUBJECTIVE AND OBJECTIVE BOX
Pt seen and examined at bedside in ICU, brought to ICU for airway monitoring post op, currently awake and extubated, unable to obtain ROS due to baseline AMS    Vital Signs Last 24 Hrs  T(C): 37.6 (2021 04:30), Max: 37.6 (2021 04:30)  T(F): 99.6 (2021 04:30), Max: 99.6 (2021 04:30)  HR: 88 (2021 06:00) (62 - 117)  BP: 132/84 (2021 05:30) (103/59 - 186/95)  BP(mean): 97 (2021 05:30) (65 - 126)  RR: 23 (2021 06:00) (12 - 32)  SpO2: 100% (2021 05:30) (90% - 100%)    PE:  General: Pt Alert and oriented, NAD  DSG C/D/I  Pulses: Foot WWP; DP pulse 2+; Cap refill < 2 sec  Sensation: unable to obtain due to baseline AMS, withdraws from painful stimuli  Motor: EHL/FHL/TA/GS 5/5 grossly intact                    LABS:                          9.6    14.03 )-----------( 205      ( 2021 04:15 )             29.1     01-05    138  |  103  |  27<H>  ----------------------------<  128<H>  3.7   |  28  |  1.08    Ca    8.8      2021 04:15  Phos  2.9     01-04  Mg     1.8     01-04    TPro  7.7  /  Alb  3.0<L>  /  TBili  2.3<H>  /  DBili  x   /  AST  48<H>  /  ALT  33  /  AlkPhos  100  01-04    LIVER FUNCTIONS - ( 2021 21:08 )  Alb: 3.0 g/dL / Pro: 7.7 gm/dL / ALK PHOS: 100 U/L / ALT: 33 U/L / AST: 48 U/L / GGT: x           PT/INR - ( 2021 04:15 )   PT: 13.4 sec;   INR: 1.16 ratio         PTT - ( 2021 12:39 )  PTT:25.9 sec  Urinalysis Basic - ( 2021 21:09 )    Color: Yellow / Appearance: Clear / S.010 / pH: x  Gluc: x / Ketone: Negative  / Bili: Negative / Urobili: Negative mg/dL   Blood: x / Protein: 30 mg/dL / Nitrite: Negative   Leuk Esterase: Small / RBC: 0-2 /HPF / WBC 6-10   Sq Epi: x / Non Sq Epi: Occasional / Bacteria: Occasional                A/P: 89yFemale s/p right hip IMN POD1    - Pain Control  - AC: home dose warfarin given post op, ok to start AC per primary team  - Post op abx: Ancef 2g Q8H x 2 doses  - WBAT  - monitor respiratory status  - medical and ICU management appreciated  - s/p extubation in ICU  - PT: pending PT eval  - Dispo planning

## 2021-01-05 NOTE — PHYSICAL THERAPY INITIAL EVALUATION ADULT - IMPAIRMENTS FOUND, PT EVAL
nasal canula/arousal, attention, and cognition/cognitive impairment/gait, locomotion, and balance/muscle strength/poor safety awareness/ventilation and respiration/gas exchange

## 2021-01-05 NOTE — OCCUPATIONAL THERAPY INITIAL EVALUATION ADULT - RANGE OF MOTION, PT EVAL
Pt will have WFL L Hip ROM (within posterior approach precautions) in order to perform mobility independently

## 2021-01-05 NOTE — OCCUPATIONAL THERAPY INITIAL EVALUATION ADULT - RANGE OF MOTION EXAMINATION, UPPER EXTREMITY
Grossly./bilateral UE Active ROM was WFL  (within functional limits)/bilateral UE Passive ROM was WFL  (within functional limits)

## 2021-01-05 NOTE — PHYSICAL THERAPY INITIAL EVALUATION ADULT - PERTINENT HX OF CURRENT PROBLEM, REHAB EVAL
89 year old female, from [facility] nursing home w/PMH dementia HTN, afib, CHF, HLD, who presents to the [facility] ED for right hip pain, X-ray of R hip on 1/3/21 revealed trochanteric fracture of the right hip with varus angulation.

## 2021-01-05 NOTE — OCCUPATIONAL THERAPY INITIAL EVALUATION ADULT - PERTINENT HX OF CURRENT PROBLEM, REHAB EVAL
89 year old female, from [facility] w/PMH dementia HTN, afib, CHF, HLD, who presents to the [facility] ED for right hip pain, 89 year old female, from [facility] nursing home w/PMH dementia HTN, afib, CHF, HLD, who presents to the [facility] ED for right hip pain, X-ray of R hip on 1/3/21 revealed trochanteric fracture of the right hip with varus angulation. Pt is S/P R hip IMN POD 1.

## 2021-01-05 NOTE — PROGRESS NOTE ADULT - SUBJECTIVE AND OBJECTIVE BOX
HPI:  · HPI Objective Statement: 89 year old female, from [facility] w/PMH dementia HTN, afib, CHF, HLD, who presents to the ED today for right hip pain, s/p fall yesterday. Noted ecchymosis to the right hip. Pt is AO x 1. Pt per chart is not on any anticoagulation, likely fall risks. Pt with negative COVID . Denies urinary incontinence, hematuria, pain, headaches, CP, neck pain, dizziness, LOC, coughs, fevers, chills, generalized weakness, nausea, vomit, diarrhea, numbness, tingling, back pain, or SOB.     (2021 13:30)      24 hr events:      ## ROS:  [ ] unable to obtain  CONSTITUTIONAL: No fever, weight loss, or fatigue  EYES: No eye pain, visual disturbances, or discharge  ENMT:  No difficulty hearing, tinnitus, vertigo; No sinus or throat pain  NECK: No pain or stiffness  RESPIRATORY: No cough, wheezing, chills or hemoptysis; No shortness of breath  CARDIOVASCULAR: No chest pain, palpitations, dizziness, or leg swelling  GASTROINTESTINAL: No abdominal or epigastric pain. No nausea, vomiting, or hematemesis; No diarrhea or constipation. No melena or hematochezia.  GENITOURINARY: No dysuria, frequency, hematuria, or incontinence  NEUROLOGICAL: No headaches, memory loss, loss of strength, numbness, or tremors  SKIN: No itching, burning, rashes, or lesions   LYMPH NODES: No enlarged glands  ENDOCRINE: No heat or cold intolerance; No hair loss  MUSCULOSKELETAL: No joint pain or swelling; No muscle, back, or extremity pain  PSYCHIATRIC: No depression, anxiety, mood swings, or difficulty sleeping  HEME/LYMPH: No easy bruising, or bleeding gums  ALLERGY AND IMMUNOLOGIC: No hives or eczema    ## Labs:  CBC:                        9.6    14.03 )-----------( 205      ( 2021 04:15 )             29.1     Chem:  -    138  |  103  |  27<H>  ----------------------------<  128<H>  3.7   |  28  |  1.08    Ca    8.8      2021 04:15  Phos  2.1       Mg     2.4         TPro  7.7  /  Alb  3.0<L>  /  TBili  2.3<H>  /  DBili  x   /  AST  48<H>  /  ALT  33  /  AlkPhos  100      Coags:  PT/INR - ( 2021 04:15 )   PT: 13.4 sec;   INR: 1.16 ratio         PTT - ( 2021 12:39 )  PTT:25.9 sec        ## Imaging:    ## Medications:        atorvastatin 40 milliGRAM(s) Oral at bedtime    warfarin 5 milliGRAM(s) Oral once    magnesium hydroxide Suspension 30 milliLiter(s) Oral daily PRN    acetaminophen   Tablet .. 650 milliGRAM(s) Oral every 6 hours PRN  ondansetron Injectable 4 milliGRAM(s) IV Push every 6 hours PRN  oxyCODONE    IR 5 milliGRAM(s) Oral every 4 hours PRN  oxyCODONE    IR 2.5 milliGRAM(s) Oral every 4 hours PRN      ## Vitals:  T(C): 37.4 (21 @ 16:36), Max: 37.6 (21 @ 04:30)  HR: 109 (21 @ 20:00) (69 - 120)  BP: 131/104 (21 @ 20:00) (103/59 - 176/87)  BP(mean): 109 (21 @ 20:00) (65 - 115)  RR: 24 (21 @ 20:00) (17 - 32)  SpO2: 94% (21 @ 20:00) (90% - 100%)  Wt(kg): --  Vent:   AB-04 @ 07:  -   @ 07:00  --------------------------------------------------------  IN: 957.5 mL / OUT: 1250 mL / NET: -292.5 mL     @ 07:  -   @ 20:27  --------------------------------------------------------  IN: 1210 mL / OUT: 0 mL / NET: 1210 mL          ## P/E:  Gen: lying comfortably in bed in no apparent distress  HEENT: PERRL, EOMI  Resp: CTA B/L no c/r/w  CVS: S1S2 no m/r/g  Abd: soft NT/ND +BS  Ext: no c/c/e  Neuro: A&Ox3    CENTRAL LINE: [ ] YES [ ] NO  LOCATION:   DATE INSERTED:  REMOVE: [ ] YES [ ] NO      STEWART: [ ] YES [ ] NO    DATE INSERTED:  REMOVE:  [ ] YES [ ] NO      A-LINE:  [ ] YES [ ] NO  LOCATION:   DATE INSERTED:  REMOVE:  [ ] YES [ ] NO  EXPLAIN:    GLOBAL ISSUE/BEST PRACTICE:  Analgesia:  Sedation:  HOB elevation: yes  Stress ulcer prophylaxis:  VTE prophylaxis:  Oral Care:  Glycemic control:  Nutrition:    CODE STATUS: [ ] full code  [ ] DNR  [ ] DNI  [ ] [facility]  Goals of care discussion: [ ] yes

## 2021-01-05 NOTE — PHYSICAL THERAPY INITIAL EVALUATION ADULT - FOLLOWS COMMANDS/ANSWERS QUESTIONS, REHAB EVAL
constant verbal cueing and manual guidance required/25% of the time/able to follow multistep instructions/able to follow single-step instructions

## 2021-01-05 NOTE — OCCUPATIONAL THERAPY INITIAL EVALUATION ADULT - RANGE OF MOTION EXAMINATION, LOWER EXTREMITY
RLE AROM hip flexion grossly limited by more then 50%; RLE AROM distally to hip grossly WFL/Left LE Active ROM was WFL (within functional limits)/bilateral LE Passive ROM was WFL  (within functional limits)

## 2021-01-05 NOTE — PHYSICAL THERAPY INITIAL EVALUATION ADULT - ADDITIONAL COMMENTS
From previous EMR: Pt lives in a private house, there are steps but pt does not negotiate them. Pt has a cane and rolling walker, does not use them. Pt uses glasses, no home health aide services, recent fall. Son not able to recall if pt has had other falls. Pt needs assistance with showering, was able to walk short distances in the home without assistance prior to hospitalization.  Pt most recently at [facility].

## 2021-01-05 NOTE — OCCUPATIONAL THERAPY INITIAL EVALUATION ADULT - ADDITIONAL COMMENTS
Patient unreliable historian. As per previous PT note from last admission (12/5/20-12/13/20) and PT Torrie, "Pt lives in a private house, there are steps but pt does not negotiate them. Pt has a cane and rolling walker, does not use them. Pt uses glasses, no home health aide services, recent fall. Son not able to recall if pt has had other falls. Pt needs assistance with showering, was able to walk short distances in the home without assistance prior to hospitalization". Patient was D/C to [facility] on 12/13/20

## 2021-01-05 NOTE — OCCUPATIONAL THERAPY INITIAL EVALUATION ADULT - GENERAL OBSERVATIONS, REHAB EVAL
Pt was encountered supine in bed; NAD, S/P R hip IMN POD 1, RLE WBAT, primafit +, supplemental O2 via nasal cannula, cardiac monitor +, continuous pulse ox +, PIV +, AXOX0, confused, cooperative, followed 1 step commands less then 50% of the time; pt c/o pain in RLE which impacts pt performance with functional ADL's/transfers and mobility. PT Torrie present.

## 2021-01-05 NOTE — CHART NOTE - NSCHARTNOTEFT_GEN_A_CORE
89yo F from [facility] with PMH of AF (on Coumadin), HTN, HLD, HF, and dementia presented to ED on 1/4/20 s/p mechanical fall. Found to have Rt hip fx; taken to OR on 1/4 for IMN of Rt hip. Pt remained intubated post-op due to increased O2 requirements and transferred to ICU. Pt was extubated last night at 2030. Off precedex since 0130 today. Bradley d/c'd this am at 0600; pt has voided since then. Tolerating PO diet. Warfarin has been resumed. ortho, PT and OT following. Pt is currently stable for downgrade to medical floor.     Pt seen and case discussed with ICU attending Dr. Monterroso.   Verbal handoff given to Dr. Keller.

## 2021-01-05 NOTE — PHYSICAL THERAPY INITIAL EVALUATION ADULT - ACTIVE RANGE OF MOTION EXAMINATION, REHAB EVAL
R LE limited in flexion, abduction/bilateral upper extremity Active ROM was WFL (within functional limits)/Left LE Active ROM was WFL (within functional limits)

## 2021-01-06 LAB
ALBUMIN SERPL ELPH-MCNC: 2.6 G/DL — LOW (ref 3.3–5)
ALP SERPL-CCNC: 86 U/L — SIGNIFICANT CHANGE UP (ref 40–120)
ALT FLD-CCNC: 24 U/L — SIGNIFICANT CHANGE UP (ref 12–78)
ANION GAP SERPL CALC-SCNC: 7 MMOL/L — SIGNIFICANT CHANGE UP (ref 5–17)
AST SERPL-CCNC: 46 U/L — HIGH (ref 15–37)
BASOPHILS # BLD AUTO: 0.02 K/UL — SIGNIFICANT CHANGE UP (ref 0–0.2)
BASOPHILS NFR BLD AUTO: 0.2 % — SIGNIFICANT CHANGE UP (ref 0–2)
BILIRUB SERPL-MCNC: 1.4 MG/DL — HIGH (ref 0.2–1.2)
BUN SERPL-MCNC: 20 MG/DL — SIGNIFICANT CHANGE UP (ref 7–23)
CALCIUM SERPL-MCNC: 8.4 MG/DL — LOW (ref 8.5–10.1)
CHLORIDE SERPL-SCNC: 105 MMOL/L — SIGNIFICANT CHANGE UP (ref 96–108)
CO2 SERPL-SCNC: 28 MMOL/L — SIGNIFICANT CHANGE UP (ref 22–31)
CREAT SERPL-MCNC: 0.82 MG/DL — SIGNIFICANT CHANGE UP (ref 0.5–1.3)
EOSINOPHIL # BLD AUTO: 0.13 K/UL — SIGNIFICANT CHANGE UP (ref 0–0.5)
EOSINOPHIL NFR BLD AUTO: 1.1 % — SIGNIFICANT CHANGE UP (ref 0–6)
GLUCOSE SERPL-MCNC: 106 MG/DL — HIGH (ref 70–99)
HCT VFR BLD CALC: 29.2 % — LOW (ref 34.5–45)
HGB BLD-MCNC: 9.5 G/DL — LOW (ref 11.5–15.5)
IMM GRANULOCYTES NFR BLD AUTO: 0.7 % — SIGNIFICANT CHANGE UP (ref 0–1.5)
INR BLD: 1.19 RATIO — HIGH (ref 0.88–1.16)
LACTATE SERPL-SCNC: 1.6 MMOL/L — SIGNIFICANT CHANGE UP (ref 0.7–2)
LYMPHOCYTES # BLD AUTO: 1.33 K/UL — SIGNIFICANT CHANGE UP (ref 1–3.3)
LYMPHOCYTES # BLD AUTO: 11.1 % — LOW (ref 13–44)
MAGNESIUM SERPL-MCNC: 2.1 MG/DL — SIGNIFICANT CHANGE UP (ref 1.6–2.6)
MCHC RBC-ENTMCNC: 31.7 PG — SIGNIFICANT CHANGE UP (ref 27–34)
MCHC RBC-ENTMCNC: 32.5 GM/DL — SIGNIFICANT CHANGE UP (ref 32–36)
MCV RBC AUTO: 97.3 FL — SIGNIFICANT CHANGE UP (ref 80–100)
MONOCYTES # BLD AUTO: 0.93 K/UL — HIGH (ref 0–0.9)
MONOCYTES NFR BLD AUTO: 7.7 % — SIGNIFICANT CHANGE UP (ref 2–14)
NEUTROPHILS # BLD AUTO: 9.52 K/UL — HIGH (ref 1.8–7.4)
NEUTROPHILS NFR BLD AUTO: 79.2 % — HIGH (ref 43–77)
NRBC # BLD: 0 /100 WBCS — SIGNIFICANT CHANGE UP (ref 0–0)
PHOSPHATE SERPL-MCNC: 2 MG/DL — LOW (ref 2.5–4.5)
PLATELET # BLD AUTO: 214 K/UL — SIGNIFICANT CHANGE UP (ref 150–400)
POTASSIUM SERPL-MCNC: 3.4 MMOL/L — LOW (ref 3.5–5.3)
POTASSIUM SERPL-SCNC: 3.4 MMOL/L — LOW (ref 3.5–5.3)
PROT SERPL-MCNC: 7.1 GM/DL — SIGNIFICANT CHANGE UP (ref 6–8.3)
PROTHROM AB SERPL-ACNC: 13.7 SEC — HIGH (ref 10.6–13.6)
RBC # BLD: 3 M/UL — LOW (ref 3.8–5.2)
RBC # FLD: 13.8 % — SIGNIFICANT CHANGE UP (ref 10.3–14.5)
SODIUM SERPL-SCNC: 140 MMOL/L — SIGNIFICANT CHANGE UP (ref 135–145)
WBC # BLD: 12.02 K/UL — HIGH (ref 3.8–10.5)
WBC # FLD AUTO: 12.02 K/UL — HIGH (ref 3.8–10.5)

## 2021-01-06 PROCEDURE — 99233 SBSQ HOSP IP/OBS HIGH 50: CPT

## 2021-01-06 RX ORDER — SODIUM,POTASSIUM PHOSPHATES 278-250MG
1 POWDER IN PACKET (EA) ORAL
Refills: 0 | Status: COMPLETED | OUTPATIENT
Start: 2021-01-06 | End: 2021-01-07

## 2021-01-06 RX ORDER — WARFARIN SODIUM 2.5 MG/1
5 TABLET ORAL ONCE
Refills: 0 | Status: COMPLETED | OUTPATIENT
Start: 2021-01-06 | End: 2021-01-06

## 2021-01-06 RX ORDER — POTASSIUM CHLORIDE 20 MEQ
40 PACKET (EA) ORAL ONCE
Refills: 0 | Status: COMPLETED | OUTPATIENT
Start: 2021-01-06 | End: 2021-01-06

## 2021-01-06 RX ADMIN — Medication 1 MILLIGRAM(S): at 10:51

## 2021-01-06 RX ADMIN — Medication 500 MILLIGRAM(S): at 05:54

## 2021-01-06 RX ADMIN — ATORVASTATIN CALCIUM 40 MILLIGRAM(S): 80 TABLET, FILM COATED ORAL at 00:13

## 2021-01-06 RX ADMIN — ATORVASTATIN CALCIUM 40 MILLIGRAM(S): 80 TABLET, FILM COATED ORAL at 21:34

## 2021-01-06 RX ADMIN — Medication 40 MILLIEQUIVALENT(S): at 10:48

## 2021-01-06 RX ADMIN — CHLORHEXIDINE GLUCONATE 1 APPLICATION(S): 213 SOLUTION TOPICAL at 08:12

## 2021-01-06 RX ADMIN — Medication 500 MILLIGRAM(S): at 16:56

## 2021-01-06 RX ADMIN — OXYCODONE HYDROCHLORIDE 5 MILLIGRAM(S): 5 TABLET ORAL at 02:25

## 2021-01-06 RX ADMIN — OXYCODONE HYDROCHLORIDE 5 MILLIGRAM(S): 5 TABLET ORAL at 16:59

## 2021-01-06 RX ADMIN — Medication 1 PACKET(S): at 16:56

## 2021-01-06 RX ADMIN — Medication 1 TABLET(S): at 10:50

## 2021-01-06 RX ADMIN — WARFARIN SODIUM 5 MILLIGRAM(S): 2.5 TABLET ORAL at 21:34

## 2021-01-06 RX ADMIN — OXYCODONE HYDROCHLORIDE 5 MILLIGRAM(S): 5 TABLET ORAL at 03:25

## 2021-01-06 NOTE — PROGRESS NOTE ADULT - SUBJECTIVE AND OBJECTIVE BOX
Pt seen and examined at bedside, confused at baseline. Pt stepped down from ICU yesterday.     Vital Signs Last 24 Hrs  T(C): 37.5 (06 Jan 2021 05:05), Max: 37.6 (05 Jan 2021 12:00)  T(F): 99.5 (06 Jan 2021 05:05), Max: 99.6 (05 Jan 2021 12:00)  HR: 101 (06 Jan 2021 05:05) (72 - 120)  BP: 152/84 (06 Jan 2021 05:05) (104/81 - 176/87)  BP(mean): 109 (05 Jan 2021 20:00) (78 - 109)  RR: 18 (06 Jan 2021 05:05) (17 - 31)  SpO2: 97% (06 Jan 2021 05:05) (91% - 100%)    PE:  General: Pt Alert and oriented, NAD  DSG C/D/I  Pulses: Foot WWP; DP pulse 2+; Cap refill < 2 sec  Sensation: unable to obtain due to baseline AMS, withdraws from painful stimuli  Motor: EHL/FHL/TA/GS 5/5 grossly intact          A/P: 89yFemale s/p right hip IMN POD2    - Pain Control  - AC: warfarin  - WBAT  - medical management per primary team  - s/p extubation in ICU  - PT: CARLOS  - Dispo planning  - Ortho stable for discharge

## 2021-01-06 NOTE — PROGRESS NOTE ADULT - SUBJECTIVE AND OBJECTIVE BOX
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 650 milliGRAM(s) Oral every 6 hours PRN Mild Pain (1 - 3)  bisacodyl Suppository 10 milliGRAM(s) Rectal daily PRN If no bowel movement  magnesium hydroxide Suspension 30 milliLiter(s) Oral daily PRN Constipation  ondansetron Injectable 4 milliGRAM(s) IV Push every 6 hours PRN Nausea and/or Vomiting  oxyCODONE    IR 5 milliGRAM(s) Oral every 4 hours PRN Severe Pain (7 - 10)  oxyCODONE    IR 2.5 milliGRAM(s) Oral every 4 hours PRN Mild Pain (1 - 3)      Allergies    No Known Allergies    Intolerances        REVIEW OF SYSTEMS:  CONSTITUTIONAL: No fever, weight loss, or fatigue  EYES: No eye pain, visual disturbances, or discharge  ENMT:  No difficulty hearing, tinnitus, vertigo; No sinus or throat pain  NECK: No pain or stiffness  BREASTS: No pain, masses, or nipple discharge  RESPIRATORY: No cough, wheezing, chills or hemoptysis; No shortness of breath  CARDIOVASCULAR: No chest pain, palpitations, dizziness, or leg swelling  GASTROINTESTINAL: No abdominal or epigastric pain. No nausea, vomiting, or hematemesis; No diarrhea or constipation. No melena or hematochezia.  GENITOURINARY: No dysuria, frequency, hematuria, or incontinence  NEUROLOGICAL: No headaches, memory loss, loss of strength, numbness, or tremors  SKIN: No itching, burning, rashes, or lesions   LYMPH NODES: No enlarged glands  ENDOCRINE: No heat or cold intolerance; No hair loss  MUSCULOSKELETAL: No joint pain or swelling; No muscle, back, or extremity pain  PSYCHIATRIC: No depression, anxiety, mood swings, or difficulty sleeping  HEME/LYMPH: No easy bruising, or bleeding gums  ALLERY AND IMMUNOLOGIC: No hives or eczema    Vital Signs Last 24 Hrs  T(C): 37.5 (2021 05:05), Max: 37.6 (2021 12:00)  T(F): 99.5 (2021 05:05), Max: 99.6 (2021 12:00)  HR: 101 (2021 05:05) (76 - 120)  BP: 152/84 (2021 05:05) (104/81 - 176/87)  BP(mean): 109 (2021 20:00) (84 - 109)  RR: 18 (2021 05:05) (18 - 28)  SpO2: 97% (2021 05:05) (91% - 100%)    PHYSICAL EXAM:  GENERAL: NAD, well-groomed, well-developed  HEAD:  Atraumatic, Normocephalic  EYES: EOMI, PERRLA, conjunctiva and sclera clear  ENMT: No tonsillar erythema, exudates, or enlargement; Moist mucous membranes, Good dentition, No lesions  NECK: Supple, No JVD, Normal thyroid  NERVOUS SYSTEM:  Alert & Oriented X3, Good concentration; Motor Strength 5/5 B/L upper and lower extremities; DTRs 2+ intact and symmetric  CHEST/LUNG: Clear to percussion bilaterally; No rales, rhonchi, wheezing, or rubs  HEART: Regular rate and rhythm; No murmurs, rubs, or gallops  ABDOMEN: Soft, Nontender, Nondistended; Bowel sounds present  EXTREMITIES:  2+ Peripheral Pulses, No clubbing, cyanosis, or edema  LYMPH: No lymphadenopathy noted  SKIN: No rashes or lesions    LABS:                        9.5    12.02 )-----------( 214      ( 2021 07:17 )             29.2     01-06    140  |  105  |  20  ----------------------------<  106<H>  3.4<L>   |  28  |  0.82    Ca    8.4<L>      2021 07:17  Phos  2.0     -  Mg     2.1     -    TPro  7.1  /  Alb  2.6<L>  /  TBili  1.4<H>  /  DBili  x   /  AST  46<H>  /  ALT  24  /  AlkPhos  86  -      PT/INR - ( 2021 07:17 )   PT: 13.7 sec;   INR: 1.19 ratio         PTT - ( 2021 12:39 )  PTT:25.9 sec  Urinalysis Basic - ( 2021 21:09 )    Color: Yellow / Appearance: Clear / S.010 / pH: x  Gluc: x / Ketone: Negative  / Bili: Negative / Urobili: Negative mg/dL   Blood: x / Protein: 30 mg/dL / Nitrite: Negative   Leuk Esterase: Small / RBC: 0-2 /HPF / WBC 6-10   Sq Epi: x / Non Sq Epi: Occasional / Bacteria: Occasional      CAPILLARY BLOOD GLUCOSE          CARDIAC MARKERS ( 2021 04:15 )  .628 ng/mL / x     / x     / x     / x      CARDIAC MARKERS ( 2021 21:08 )  .712 ng/mL / x     / 620 U/L / x     / 4.4 ng/mL            RADIOLOGY & ADDITIONAL TESTS:    Imaging Personally Reviewed:  [ ] YES  [ ] NO    Consultant(s) Notes Reviewed:  [ ] YES  [ ] NO    Care Discussed with Consultants/Other Providers [ ] YES  [ ] NO    Care discussed with family,         [  ]   yes  [  ]  No    imp:    stable[ ]    unstable[  ]     improving [ x  ]       unchanged  [  ]                Plans:  Continue present plans  [x  ] coumadin 5 mg today,                New consult [  ]   specialty  .......               order test[  ]    test name.                  Discharge Planning  [ x ]

## 2021-01-06 NOTE — PROGRESS NOTE ADULT - SUBJECTIVE AND OBJECTIVE BOX
Patient is a 89y old  Female who presents with a chief complaint of rt hip fracture (06 Jan 2021 06:36)    PAST MEDICAL & SURGICAL HISTORY:  Hypertension  A-fib  CVA (cerebral vascular accident)  Left MCA infarct with encephlomalacia    INTERVAL HISTORY:  	  MEDICATIONS:  MEDICATIONS  (STANDING):  ascorbic acid 500 milliGRAM(s) Oral two times a day  atorvastatin 40 milliGRAM(s) Oral at bedtime  chlorhexidine 4% Liquid 1 Application(s) Topical <User Schedule>  folic acid 1 milliGRAM(s) Oral daily  influenza   Vaccine 0.5 milliLiter(s) IntraMuscular once  multivitamin 1 Tablet(s) Oral daily  potassium chloride   Powder 40 milliEquivalent(s) Oral once  potassium phosphate / sodium phosphate Powder (PHOS-NaK) 1 Packet(s) Oral two times a day    MEDICATIONS  (PRN):  acetaminophen   Tablet .. 650 milliGRAM(s) Oral every 6 hours PRN Mild Pain (1 - 3)  bisacodyl Suppository 10 milliGRAM(s) Rectal daily PRN If no bowel movement  magnesium hydroxide Suspension 30 milliLiter(s) Oral daily PRN Constipation  ondansetron Injectable 4 milliGRAM(s) IV Push every 6 hours PRN Nausea and/or Vomiting  oxyCODONE    IR 5 milliGRAM(s) Oral every 4 hours PRN Severe Pain (7 - 10)  oxyCODONE    IR 2.5 milliGRAM(s) Oral every 4 hours PRN Mild Pain (1 - 3)    Vitals:  T(F): 99.5 (01-06-21 @ 05:05), Max: 99.6 (01-05-21 @ 12:00)  HR: 101 (01-06-21 @ 05:05) (76 - 120)  BP: 152/84 (01-06-21 @ 05:05) (104/81 - 176/87)  RR: 18 (01-06-21 @ 05:05) (18 - 28)  SpO2: 97% (01-06-21 @ 05:05) (91% - 100%)  Wt(kg): 61  01-05 @ 07:01  -  01-06 @ 07:00  --------------------------------------------------------  IN:    IV PiggyBack: 250 mL    Lactated Ringers: 500 mL    Oral Fluid: 460 mL  Total IN: 1210 mL    OUT:  Total OUT: 0 mL  Total NET: 1210 mL  Weight (kg): 60.1 (01-04 @ 18:45)    PHYSICAL EXAM:  Neuro: Awake, responsive  CV: S1 S2 RRR  Lungs: CTABL  GI: Soft, BS +, ND, NT  Extremities: No edema    TELEMETRY: SR 1st  	   RADIOLOGY:   < from: Xray Chest 1 View- PORTABLE-Urgent (01.03.21 @ 11:59) >  IMPRESSION: The heart is normal in size. There is bilateral patchy airspace disease that may be from pulmonary edema or infection. Degenerative changes are noted of the bones.    < end of copied text >    < from: CT Head No Cont (01.03.21 @ 12:23) >  IMPRESSION:    BRAIN CT : Old left MCA infarct as seen on the prior. Resolution of previously identified extracalvarial soft tissue swelling in the left frontal region.  CERVICAL SPINE CT: Evolving compression T3 better seen on the current evaluation though described on the prior 12/12/2020 as well.    < end of copied text >    DIAGNOSTIC TESTING:  [X ] Echocardiogram:  < from: TTE Echo Complete w/o Contrast w/ Doppler (01.03.21 @ 15:07) >  Summary:   1. Left ventricular ejection fraction, by visual estimation, is 60 to 65%.   2. Technically fair study.   3. Normal global left ventricular systolic function.   4. Elevated left ventricular end-diastolic pressure.   5. Mildly increased LV wall thickness.   6. Normal left ventricular internal cavity size.   7. Spectral Doppler shows restrictive pattern of left ventricular myocardial filling (Grade III diastolic dysfunction).   8. Normal right ventricular size and function.   9. Mildly enlarged left atrium.  10. Mildly enlarged right atrium.  11. There is no evidence of pericardial effusion.  12. Mild mitral valve regurgitation.  13. Mild thickening and calcification of the anterior and posterior mitral valve leaflets.  14. Moderate tricuspid regurgitation.  15. Sclerotic aortic valve with decreased opening.  16. Mild pulmonic valve regurgitation.  17. Estimated pulmonary artery systolic pressure is 70.0 mmHg assuming a right atrial pressure of 8 mmHg, which is consistent with severe pulmonary hypertension.  18. Increased relative wall thickness with normal mass index consistent with left ventricular concentric remodeling.      < end of copied text >    LABS:	 	    CARDIAC MARKERS:  Troponin I, Serum: .628 ng/mL (01-05 @ 04:15)  Troponin I, Serum: .712 ng/mL (01-04 @ 21:08)  Troponin I, Serum: .300 ng/mL (01-04 @ 04:01)  Troponin I, Serum: .254 ng/mL (01-03 @ 11:11)    06 Jan 2021 07:17    140    |  105    |  20     ----------------------------<  106    3.4     |  28     |  0.82   05 Jan 2021 04:15    138    |  103    |  27     ----------------------------<  128    3.7     |  28     |  1.08   04 Jan 2021 21:08    137    |  102    |  28     ----------------------------<  161    3.3     |  28     |  1.27     Ca    8.4        06 Jan 2021 07:17  Phos  2.0       06 Jan 2021 07:17  Mg     2.1       06 Jan 2021 07:17    TPro  7.1    /  Alb  2.6    /  TBili  1.4    /  DBili  x      /  AST  46     /  ALT  24     /  AlkPhos  86     06 Jan 2021 07:17                          9.5    12.02 )-----------( 214      ( 06 Jan 2021 07:17 )             29.2 ,                       9.6    14.03 )-----------( 205      ( 05 Jan 2021 04:15 )             29.1 ,                       9.6    16.39 )-----------( 213      ( 04 Jan 2021 21:08 )             28.8 ,                       10.1   13.51 )-----------( 197      ( 04 Jan 2021 04:01 )             30.3 ,                       11.1   15.54 )-----------( 197      ( 03 Jan 2021 11:11 )             34.4   proBNP: Serum Pro-Brain Natriuretic Peptide: 4300 pg/mL (01-03 @ 11:11)  TSH: Thyroid Stimulating Hormone, Serum: 2.590 uIU/mL (01-03 @ 16:51)  PT/PTT- ( 06 Jan 2021 07:17 )   PT: 13.7 sec;  PTT: x      INR: 1.19 ratio (01-06 @ 07:17)  INR: 1.16 ratio (01-05 @ 04:15)  INR: 1.38 ratio (01-04 @ 12:39)  INR: 1.89 ratio (01-04 @ 04:01)  INR: 3.46 ratio (01-03 @ 11:11)           Patient is a 89y old  Female who presents with a chief complaint of rt hip fracture (06 Jan 2021 06:36)    PAST MEDICAL & SURGICAL HISTORY:  Hypertension  A-fib  CVA (cerebral vascular accident)  Left MCA infarct with encephlomalacia    INTERVAL HISTORY: Confused at baseline, increased right hip pain with movement  	  MEDICATIONS:  MEDICATIONS  (STANDING):  ascorbic acid 500 milliGRAM(s) Oral two times a day  atorvastatin 40 milliGRAM(s) Oral at bedtime  chlorhexidine 4% Liquid 1 Application(s) Topical <User Schedule>  folic acid 1 milliGRAM(s) Oral daily  influenza   Vaccine 0.5 milliLiter(s) IntraMuscular once  multivitamin 1 Tablet(s) Oral daily  potassium chloride   Powder 40 milliEquivalent(s) Oral once  potassium phosphate / sodium phosphate Powder (PHOS-NaK) 1 Packet(s) Oral two times a day    MEDICATIONS  (PRN):  acetaminophen   Tablet .. 650 milliGRAM(s) Oral every 6 hours PRN Mild Pain (1 - 3)  bisacodyl Suppository 10 milliGRAM(s) Rectal daily PRN If no bowel movement  magnesium hydroxide Suspension 30 milliLiter(s) Oral daily PRN Constipation  ondansetron Injectable 4 milliGRAM(s) IV Push every 6 hours PRN Nausea and/or Vomiting  oxyCODONE    IR 5 milliGRAM(s) Oral every 4 hours PRN Severe Pain (7 - 10)  oxyCODONE    IR 2.5 milliGRAM(s) Oral every 4 hours PRN Mild Pain (1 - 3)    Vitals:  T(F): 99.5 (01-06-21 @ 05:05), Max: 99.6 (01-05-21 @ 12:00)  HR: 101 (01-06-21 @ 05:05) (76 - 120)  BP: 152/84 (01-06-21 @ 05:05) (104/81 - 176/87)  RR: 18 (01-06-21 @ 05:05) (18 - 28)  SpO2: 97% (01-06-21 @ 05:05) (91% - 100%)  Wt(kg): 61  01-05 @ 07:01  -  01-06 @ 07:00  --------------------------------------------------------  IN:    IV PiggyBack: 250 mL    Lactated Ringers: 500 mL    Oral Fluid: 460 mL  Total IN: 1210 mL    OUT:  Total OUT: 0 mL  Total NET: 1210 mL  Weight (kg): 60.1 (01-04 @ 18:45)    PHYSICAL EXAM:  Neuro: Awake, responsive, confused  CV: S1 S2 RRR  Lungs: CTABL  GI: Soft, BS +, ND, NT  Extremities: right hip dressing intact with ecchymosis    TELEMETRY: SR 1st  	   RADIOLOGY:   < from: Xray Chest 1 View- PORTABLE-Urgent (01.03.21 @ 11:59) >  IMPRESSION: The heart is normal in size. There is bilateral patchy airspace disease that may be from pulmonary edema or infection. Degenerative changes are noted of the bones.    < end of copied text >    < from: CT Head No Cont (01.03.21 @ 12:23) >  IMPRESSION:    BRAIN CT : Old left MCA infarct as seen on the prior. Resolution of previously identified extracalvarial soft tissue swelling in the left frontal region.  CERVICAL SPINE CT: Evolving compression T3 better seen on the current evaluation though described on the prior 12/12/2020 as well.    < end of copied text >    DIAGNOSTIC TESTING:  [X ] Echocardiogram:  < from: TTE Echo Complete w/o Contrast w/ Doppler (01.03.21 @ 15:07) >  Summary:   1. Left ventricular ejection fraction, by visual estimation, is 60 to 65%.   2. Technically fair study.   3. Normal global left ventricular systolic function.   4. Elevated left ventricular end-diastolic pressure.   5. Mildly increased LV wall thickness.   6. Normal left ventricular internal cavity size.   7. Spectral Doppler shows restrictive pattern of left ventricular myocardial filling (Grade III diastolic dysfunction).   8. Normal right ventricular size and function.   9. Mildly enlarged left atrium.  10. Mildly enlarged right atrium.  11. There is no evidence of pericardial effusion.  12. Mild mitral valve regurgitation.  13. Mild thickening and calcification of the anterior and posterior mitral valve leaflets.  14. Moderate tricuspid regurgitation.  15. Sclerotic aortic valve with decreased opening.  16. Mild pulmonic valve regurgitation.  17. Estimated pulmonary artery systolic pressure is 70.0 mmHg assuming a right atrial pressure of 8 mmHg, which is consistent with severe pulmonary hypertension.  18. Increased relative wall thickness with normal mass index consistent with left ventricular concentric remodeling.      < end of copied text >    LABS:	 	    CARDIAC MARKERS:  Troponin I, Serum: .628 ng/mL (01-05 @ 04:15)  Troponin I, Serum: .712 ng/mL (01-04 @ 21:08)  Troponin I, Serum: .300 ng/mL (01-04 @ 04:01)  Troponin I, Serum: .254 ng/mL (01-03 @ 11:11)    06 Jan 2021 07:17    140    |  105    |  20     ----------------------------<  106    3.4     |  28     |  0.82   05 Jan 2021 04:15    138    |  103    |  27     ----------------------------<  128    3.7     |  28     |  1.08   04 Jan 2021 21:08    137    |  102    |  28     ----------------------------<  161    3.3     |  28     |  1.27     Ca    8.4        06 Jan 2021 07:17  Phos  2.0       06 Jan 2021 07:17  Mg     2.1       06 Jan 2021 07:17    TPro  7.1    /  Alb  2.6    /  TBili  1.4    /  DBili  x      /  AST  46     /  ALT  24     /  AlkPhos  86     06 Jan 2021 07:17                          9.5    12.02 )-----------( 214      ( 06 Jan 2021 07:17 )             29.2 ,                       9.6    14.03 )-----------( 205      ( 05 Jan 2021 04:15 )             29.1 ,                       9.6    16.39 )-----------( 213      ( 04 Jan 2021 21:08 )             28.8 ,                       10.1   13.51 )-----------( 197      ( 04 Jan 2021 04:01 )             30.3 ,                       11.1   15.54 )-----------( 197      ( 03 Jan 2021 11:11 )             34.4   proBNP: Serum Pro-Brain Natriuretic Peptide: 4300 pg/mL (01-03 @ 11:11)  TSH: Thyroid Stimulating Hormone, Serum: 2.590 uIU/mL (01-03 @ 16:51)  PT/PTT- ( 06 Jan 2021 07:17 )   PT: 13.7 sec;  PTT: x      INR: 1.19 ratio (01-06 @ 07:17)  INR: 1.16 ratio (01-05 @ 04:15)  INR: 1.38 ratio (01-04 @ 12:39)  INR: 1.89 ratio (01-04 @ 04:01)  INR: 3.46 ratio (01-03 @ 11:11)

## 2021-01-06 NOTE — PROGRESS NOTE ADULT - ASSESSMENT
89-year-old with hypertension, chronic atrial fibrillation, hypertension, heart failure, dementia, presents from nursing home after a fall and right hip pain. Diffuse no obvious chest pains, shortness of breath noted. Findings of hip fracture noted. Elevated BNP and troponin likely from an element of mild heart failure preserved ejection fraction.     ·	Right hip Fx s/p IMN 1/5   ·	Troponemia (demand ischemia)  ·	Chronic HFpEF  ·	Chronic Afib  ·	HTN  ·	HLD  ·	Hypokalemia  ·	Hypophosphatemia     Resume diltiazem 300 mg daily, digoxin 125 mcg daily, metoprolol 25 mg b.i.d. ,isosorbide 60 mg daily for heart rate and blood pressure control.   Resume atorvastatin for lipid-lowering.   Trops on downtrend  Would resume warfarin postoperatively as soon as possible for stroke risk reduction in the setting of atrial fibrillation.   Supplement lytes  CARLOS   89-year-old female with hypertension, chronic atrial fibrillation, heart failure, dementia, presents from nursing home after a fall and right hip pain. Diffuse no obvious chest pains, shortness of breath noted. Findings of hip fracture noted. Elevated BNP and troponin likely from an element of mild heart failure preserved ejection fraction.     ECHO: EF 60-65%. Mild MR / RI, Mod TR, severe pHTN, Grade III diastolic dysfunction    ·	Right hip Fx s/p IMN 1/5   ·	Troponemia (likely demand ischemia)  ·	Chronic diastolic HF  ·	Chronic Afib  ·	HTN  ·	HLD  ·	Hypokalemia  ·	Hypophosphatemia     PLAN  Post-op management as per ortho, pain control  Resume metoprolol 25 mg b.i.d. for now   Resume atorvastatin for lipid-lowering.   Trops on downtrend  Restarted on coumadin in the setting of atrial fibrillation.   Supplement tree  PT / CARLOS    89-year-old female with hypertension, chronic atrial fibrillation, heart failure, dementia, presents from nursing home after a fall and right hip pain. Diffuse no obvious chest pains, shortness of breath noted. Findings of hip fracture noted. Elevated BNP and troponin likely from an element of mild heart failure preserved ejection fraction.     ECHO: EF 60-65%. Mild MR / TX, Mod TR, severe pHTN, Grade III diastolic dysfunction    ·	Right hip Fx s/p IMN 1/5   ·	Troponemia (likely demand ischemia)  ·	Chronic diastolic HF  ·	Chronic Afib  ·	HTN  ·	HLD  ·	Hypokalemia  ·	Hypophosphatemia     PLAN  Post-op management as per ortho, pain control  Resume metoprolol 25 mg b.i.d. for now   Resume atorvastatin for lipid-lowering.   Trops on downtrend  Restarted on coumadin in the setting of atrial fibrillation. Reassess long term AC in setting of high risk of fall  Supplement lytes  PT / CARLOS   Signing off now, please reconsult as needed

## 2021-01-07 VITALS
DIASTOLIC BLOOD PRESSURE: 83 MMHG | HEART RATE: 108 BPM | TEMPERATURE: 98 F | OXYGEN SATURATION: 97 % | RESPIRATION RATE: 16 BRPM | SYSTOLIC BLOOD PRESSURE: 141 MMHG

## 2021-01-07 LAB
ANION GAP SERPL CALC-SCNC: 4 MMOL/L — LOW (ref 5–17)
BUN SERPL-MCNC: 19 MG/DL — SIGNIFICANT CHANGE UP (ref 7–23)
CALCIUM SERPL-MCNC: 8.5 MG/DL — SIGNIFICANT CHANGE UP (ref 8.5–10.1)
CHLORIDE SERPL-SCNC: 106 MMOL/L — SIGNIFICANT CHANGE UP (ref 96–108)
CO2 SERPL-SCNC: 28 MMOL/L — SIGNIFICANT CHANGE UP (ref 22–31)
CREAT SERPL-MCNC: 0.88 MG/DL — SIGNIFICANT CHANGE UP (ref 0.5–1.3)
GLUCOSE SERPL-MCNC: 107 MG/DL — HIGH (ref 70–99)
HCT VFR BLD CALC: 27.6 % — LOW (ref 34.5–45)
HGB BLD-MCNC: 9.1 G/DL — LOW (ref 11.5–15.5)
INR BLD: 1.45 RATIO — HIGH (ref 0.88–1.16)
MCHC RBC-ENTMCNC: 31.7 PG — SIGNIFICANT CHANGE UP (ref 27–34)
MCHC RBC-ENTMCNC: 33 GM/DL — SIGNIFICANT CHANGE UP (ref 32–36)
MCV RBC AUTO: 96.2 FL — SIGNIFICANT CHANGE UP (ref 80–100)
NRBC # BLD: 0 /100 WBCS — SIGNIFICANT CHANGE UP (ref 0–0)
PLATELET # BLD AUTO: 204 K/UL — SIGNIFICANT CHANGE UP (ref 150–400)
POTASSIUM SERPL-MCNC: 4 MMOL/L — SIGNIFICANT CHANGE UP (ref 3.5–5.3)
POTASSIUM SERPL-SCNC: 4 MMOL/L — SIGNIFICANT CHANGE UP (ref 3.5–5.3)
PROTHROM AB SERPL-ACNC: 16.5 SEC — HIGH (ref 10.6–13.6)
RBC # BLD: 2.87 M/UL — LOW (ref 3.8–5.2)
RBC # FLD: 14.4 % — SIGNIFICANT CHANGE UP (ref 10.3–14.5)
SODIUM SERPL-SCNC: 138 MMOL/L — SIGNIFICANT CHANGE UP (ref 135–145)
WBC # BLD: 10.95 K/UL — HIGH (ref 3.8–10.5)
WBC # FLD AUTO: 10.95 K/UL — HIGH (ref 3.8–10.5)

## 2021-01-07 RX ORDER — METOPROLOL TARTRATE 50 MG
25 TABLET ORAL
Refills: 0 | Status: DISCONTINUED | OUTPATIENT
Start: 2021-01-07 | End: 2021-01-07

## 2021-01-07 RX ORDER — ASCORBIC ACID 60 MG
1 TABLET,CHEWABLE ORAL
Qty: 0 | Refills: 0 | DISCHARGE
Start: 2021-01-07

## 2021-01-07 RX ORDER — WARFARIN SODIUM 2.5 MG/1
5 TABLET ORAL ONCE
Refills: 0 | Status: DISCONTINUED | OUTPATIENT
Start: 2021-01-07 | End: 2021-01-07

## 2021-01-07 RX ORDER — FOLIC ACID 0.8 MG
1 TABLET ORAL
Qty: 0 | Refills: 0 | DISCHARGE
Start: 2021-01-07

## 2021-01-07 RX ADMIN — Medication 25 MILLIGRAM(S): at 17:23

## 2021-01-07 RX ADMIN — Medication 1 PACKET(S): at 05:21

## 2021-01-07 RX ADMIN — Medication 1 MILLIGRAM(S): at 12:10

## 2021-01-07 RX ADMIN — Medication 500 MILLIGRAM(S): at 05:21

## 2021-01-07 RX ADMIN — Medication 500 MILLIGRAM(S): at 17:23

## 2021-01-07 RX ADMIN — Medication 1 TABLET(S): at 12:09

## 2021-01-07 NOTE — PROGRESS NOTE ADULT - ASSESSMENT
S/P IM nail right right hip intertrochanteric fracture.    Plan:  PT/TTWBA RLE with walker  Pain management .  DVT prophylaxis  ICE Rt hip.  Incentive spirometry.  Ortho Stable. May discharge to rehab when cleared by medicine and follow as outpatient.

## 2021-01-07 NOTE — PROGRESS NOTE ADULT - SUBJECTIVE AND OBJECTIVE BOX
Patient is a 89y old  Female who presents with a chief complaint of rt hip fracture (07 Jan 2021 08:55)  s/p medullry nail rt hip   clinically stable for dischrge    INTERVAL HPI/OVERNIGHT EVENTS:  PAST MEDICAL & SURGICAL HISTORY:  Hypertension    A-fib  stable    CVA (cerebral vascular accident)  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    No significant past surgical history        MEDICATIONS  (STANDING):  ascorbic acid 500 milliGRAM(s) Oral two times a day  atorvastatin 40 milliGRAM(s) Oral at bedtime  chlorhexidine 4% Liquid 1 Application(s) Topical <User Schedule>  folic acid 1 milliGRAM(s) Oral daily  influenza   Vaccine 0.5 milliLiter(s) IntraMuscular once  metoprolol tartrate 25 milliGRAM(s) Oral two times a day  multivitamin 1 Tablet(s) Oral daily  warfarin 5 milliGRAM(s) Oral once    MEDICATIONS  (PRN):  acetaminophen   Tablet .. 650 milliGRAM(s) Oral every 6 hours PRN Mild Pain (1 - 3)  bisacodyl Suppository 10 milliGRAM(s) Rectal daily PRN If no bowel movement  magnesium hydroxide Suspension 30 milliLiter(s) Oral daily PRN Constipation  ondansetron Injectable 4 milliGRAM(s) IV Push every 6 hours PRN Nausea and/or Vomiting  oxyCODONE    IR 5 milliGRAM(s) Oral every 4 hours PRN Severe Pain (7 - 10)  oxyCODONE    IR 2.5 milliGRAM(s) Oral every 4 hours PRN Mild Pain (1 - 3)      Allergies    No Known Allergies    Intolerances        REVIEW OF SYSTEMS:  CONSTITUTIONAL: No fever, weight loss, or fatigue  EYES: No eye pain, visual disturbances, or discharge  ENMT:  No difficulty hearing, tinnitus, vertigo; No sinus or throat pain  NECK: No pain or stiffness  BREASTS: No pain, masses, or nipple discharge  RESPIRATORY: No cough, wheezing, chills or hemoptysis; No shortness of breath  CARDIOVASCULAR: No chest pain, palpitations, dizziness, or leg swelling  GASTROINTESTINAL: No abdominal or epigastric pain. No nausea, vomiting, or hematemesis; No diarrhea or constipation. No melena or hematochezia.  GENITOURINARY: No dysuria, frequency, hematuria, or incontinence  NEUROLOGICAL: No headaches, memory loss, loss of strength, numbness, or tremors  SKIN: No itching, burning, rashes, or lesions   LYMPH NODES: No enlarged glands  ENDOCRINE: No heat or cold intolerance; No hair loss  MUSCULOSKELETAL: No joint pain or swelling; No muscle, back, or extremity pain  PSYCHIATRIC: No depression, anxiety, mood swings, or difficulty sleeping  HEME/LYMPH: No easy bruising, or bleeding gums  ALLERY AND IMMUNOLOGIC: No hives or eczema    Vital Signs Last 24 Hrs  T(C): 37.1 (07 Jan 2021 11:00), Max: 37.4 (06 Jan 2021 23:47)  T(F): 98.7 (07 Jan 2021 11:00), Max: 99.3 (06 Jan 2021 23:47)  HR: 95 (07 Jan 2021 11:00) (95 - 115)  BP: 142/79 (07 Jan 2021 11:00) (123/89 - 165/94)  BP(mean): --  RR: 18 (07 Jan 2021 11:00) (17 - 19)  SpO2: 95% (07 Jan 2021 11:00) (90% - 96%)    PHYSICAL EXAM:  GENERAL: NAD, well-groomed, well-developed  HEAD:  Atraumatic, Normocephalic  EYES: EOMI, PERRLA, conjunctiva and sclera clear  ENMT: No tonsillar erythema, exudates, or enlargement; Moist mucous membranes, Good dentition, No lesions  NECK: Supple, No JVD, Normal thyroid  NERVOUS SYSTEM:  Alert & Oriented X3, Good concentration; Motor Strength 5/5 B/L upper and lower extremities; DTRs 2+ intact and symmetric  CHEST/LUNG: Clear to percussion bilaterally; No rales, rhonchi, wheezing, or rubs  HEART: Regular rate and rhythm; No murmurs, rubs, or gallops  ABDOMEN: Soft, Nontender, Nondistended; Bowel sounds present  EXTREMITIES:  2+ Peripheral Pulses, No clubbing, cyanosis, or edema  LYMPH: No lymphadenopathy noted  SKIN: No rashes or lesions    LABS:                        9.1    10.95 )-----------( 204      ( 07 Jan 2021 07:25 )             27.6     01-07    138  |  106  |  19  ----------------------------<  107<H>  4.0   |  28  |  0.88    Ca    8.5      07 Jan 2021 07:23  Phos  2.0     01-06  Mg     2.1     01-06    TPro  7.1  /  Alb  2.6<L>  /  TBili  1.4<H>  /  DBili  x   /  AST  46<H>  /  ALT  24  /  AlkPhos  86  01-06      PT/INR - ( 07 Jan 2021 07:25 )   PT: 16.5 sec;   INR: 1.45 ratio             CAPILLARY BLOOD GLUCOSE                    RADIOLOGY & ADDITIONAL TESTS:    Imaging Personally Reviewed:  [ ] YES  [ ] NO    Consultant(s) Notes Reviewed:  [ ] YES  [ ] NO    Care Discussed with Consultants/Other Providers [ ] YES  [ ] NO    Care discussed with family,         [  ]   yes  [  ]  No    imp:    stable[x ]    unstable[  ]     improving [   ]       unchanged  [  ]                Plans:  Continue present plans  [  ]               New consult [  ]   specialty  .......               order test[  ]    test name.                  Discharge Planning  [  ]

## 2021-01-07 NOTE — PROGRESS NOTE ADULT - PROVIDER SPECIALTY LIST ADULT
Critical Care
Internal Medicine
Orthopedics
Cardiology
Internal Medicine
Orthopedics
Orthopedics
Internal Medicine
Orthopedics
Internal Medicine

## 2021-01-07 NOTE — PROGRESS NOTE ADULT - SUBJECTIVE AND OBJECTIVE BOX
Pt seen and examined at bedside, confused at baseline. Unable to obtain ROS due to AMS at baseline, moving all extremities, no complaints of pain.    Vital Signs Last 24 Hrs  T(C): 36.8 (07 Jan 2021 05:26), Max: 37.4 (06 Jan 2021 23:47)  T(F): 98.3 (07 Jan 2021 05:26), Max: 99.3 (06 Jan 2021 23:47)  HR: 104 (07 Jan 2021 05:26) (98 - 115)  BP: 165/94 (07 Jan 2021 05:26) (123/89 - 165/94)  BP(mean): --  RR: 18 (07 Jan 2021 05:26) (17 - 19)  SpO2: 95% (07 Jan 2021 05:26) (90% - 96%)    PE:  General: Pt Alert and oriented, NAD  DSG C/D/I  Pulses: Foot WWP; DP pulse 2+; Cap refill < 2 sec  Sensation: unable to obtain due to baseline AMS, withdraws from painful stimuli  Motor: EHL/FHL/TA/GS 5/5 grossly intact          A/P: 89yFemale s/p right hip IMN POD3    - Pain Control  - AC: warfarin  - WBAT  - medical management per primary team  - s/p extubation in ICU  - PT: CARLOS  - Dispo planning  - Ortho stable for discharge

## 2021-01-07 NOTE — STUDENT SIGN OFF DOCUMENT - DOCUMENTS STUDENTS ARE SIGNED OFF ON
Vital Signs/Plan of Care/Assessment and Intervention

## 2021-01-07 NOTE — DISCHARGE NOTE NURSING/CASE MANAGEMENT/SOCIAL WORK - PATIENT PORTAL LINK FT
You can access the FollowMyHealth Patient Portal offered by [facility] by registering at the following website: [email or web address]. By joining [employer]’s FollowMyHealth portal, you will also be able to view your health information using other applications (apps) compatible with our system.

## 2021-01-07 NOTE — PROGRESS NOTE ADULT - SUBJECTIVE AND OBJECTIVE BOX
Patient seen and examined    PE:  Dressing D/C/I  NVI RLE  Compartments soft B/L LE  FHL/EHL/TA/GS intact RLE

## 2021-01-07 NOTE — PROGRESS NOTE ADULT - REASON FOR ADMISSION
rt hip fracture

## 2021-01-12 DIAGNOSIS — E83.39 OTHER DISORDERS OF PHOSPHORUS METABOLISM: ICD-10-CM

## 2021-01-12 DIAGNOSIS — S72.141A DISPLACED INTERTROCHANTERIC FRACTURE OF RIGHT FEMUR, INITIAL ENCOUNTER FOR CLOSED FRACTURE: ICD-10-CM

## 2021-01-12 DIAGNOSIS — I69.898 OTHER SEQUELAE OF OTHER CEREBROVASCULAR DISEASE: ICD-10-CM

## 2021-01-12 DIAGNOSIS — E78.5 HYPERLIPIDEMIA, UNSPECIFIED: ICD-10-CM
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
## 2021-01-12 DIAGNOSIS — M80.08XA AGE-RELATED OSTEOPOROSIS WITH CURRENT PATHOLOGICAL FRACTURE, VERTEBRA(E), INITIAL ENCOUNTER FOR FRACTURE: ICD-10-CM

## 2021-01-12 DIAGNOSIS — F03.90 UNSPECIFIED DEMENTIA WITHOUT BEHAVIORAL DISTURBANCE: ICD-10-CM

## 2021-01-12 DIAGNOSIS — I11.0 HYPERTENSIVE HEART DISEASE WITH HEART FAILURE: ICD-10-CM

## 2021-01-12 DIAGNOSIS — Z86.73 PERSONAL HISTORY OF TRANSIENT ISCHEMIC ATTACK (TIA), AND CEREBRAL INFARCTION WITHOUT RESIDUAL DEFICITS: ICD-10-CM
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
## 2021-01-12 DIAGNOSIS — I27.20 PULMONARY HYPERTENSION, UNSPECIFIED: ICD-10-CM

## 2021-01-12 DIAGNOSIS — Z79.01 LONG TERM (CURRENT) USE OF ANTICOAGULANTS: ICD-10-CM

## 2021-01-12 DIAGNOSIS — I24.8 OTHER FORMS OF ACUTE ISCHEMIC HEART DISEASE: ICD-10-CM

## 2021-01-12 DIAGNOSIS — Z78.1 PHYSICAL RESTRAINT STATUS: ICD-10-CM

## 2021-01-12 DIAGNOSIS — E87.6 HYPOKALEMIA: ICD-10-CM

## 2021-01-12 DIAGNOSIS — I50.32 CHRONIC DIASTOLIC (CONGESTIVE) HEART FAILURE: ICD-10-CM

## 2021-01-12 DIAGNOSIS — N17.9 ACUTE KIDNEY FAILURE, UNSPECIFIED: ICD-10-CM

## 2021-01-12 DIAGNOSIS — R94.31 ABNORMAL ELECTROCARDIOGRAM [ECG] [EKG]: ICD-10-CM

## 2021-01-12 DIAGNOSIS — Z91.81 HISTORY OF FALLING: ICD-10-CM

## 2021-01-12 DIAGNOSIS — I48.20 CHRONIC ATRIAL FIBRILLATION, UNSPECIFIED: ICD-10-CM

## 2021-01-12 DIAGNOSIS — G93.89 OTHER SPECIFIED DISORDERS OF BRAIN: ICD-10-CM

## 2021-01-12 DIAGNOSIS — R09.02 HYPOXEMIA: ICD-10-CM

## 2021-01-20 ENCOUNTER — EMERGENCY (EMERGENCY)
Facility: HOSPITAL | Age: 86
LOS: 0 days | Discharge: NURSING, REHAB OR LONG-TERM CARE | End: 2021-01-20
Attending: STUDENT IN AN ORGANIZED HEALTH CARE EDUCATION/TRAINING PROGRAM
Payer: MEDICARE

## 2021-01-20 VITALS
TEMPERATURE: 99 F | RESPIRATION RATE: 19 BRPM | HEART RATE: 115 BPM | SYSTOLIC BLOOD PRESSURE: 128 MMHG | DIASTOLIC BLOOD PRESSURE: 79 MMHG | OXYGEN SATURATION: 98 %

## 2021-01-20 VITALS
HEIGHT: 67 IN | SYSTOLIC BLOOD PRESSURE: 117 MMHG | RESPIRATION RATE: 17 BRPM | DIASTOLIC BLOOD PRESSURE: 72 MMHG | WEIGHT: 123.02 LBS | TEMPERATURE: 98 F | HEART RATE: 62 BPM | OXYGEN SATURATION: 96 %

## 2021-01-20 DIAGNOSIS — Z96.641 PRESENCE OF RIGHT ARTIFICIAL HIP JOINT: ICD-10-CM

## 2021-01-20 DIAGNOSIS — M96.830 POSTPROCEDURAL HEMORRHAGE OF A MUSCULOSKELETAL STRUCTURE FOLLOWING A MUSCULOSKELETAL SYSTEM PROCEDURE: ICD-10-CM

## 2021-01-20 DIAGNOSIS — Z86.73 PERSONAL HISTORY OF TRANSIENT ISCHEMIC ATTACK (TIA), AND CEREBRAL INFARCTION WITHOUT RESIDUAL DEFICITS: ICD-10-CM

## 2021-01-20 DIAGNOSIS — M25.551 PAIN IN RIGHT HIP: ICD-10-CM

## 2021-01-20 DIAGNOSIS — I10 ESSENTIAL (PRIMARY) HYPERTENSION: ICD-10-CM

## 2021-01-20 DIAGNOSIS — I48.91 UNSPECIFIED ATRIAL FIBRILLATION: ICD-10-CM

## 2021-01-20 DIAGNOSIS — Z79.01 LONG TERM (CURRENT) USE OF ANTICOAGULANTS: ICD-10-CM

## 2021-01-20 PROBLEM — I63.9 CEREBRAL INFARCTION, UNSPECIFIED: Chronic | Status: ACTIVE | Noted: 2020-12-05

## 2021-01-20 PROCEDURE — 73552 X-RAY EXAM OF FEMUR 2/>: CPT | Mod: 26,RT

## 2021-01-20 PROCEDURE — 73502 X-RAY EXAM HIP UNI 2-3 VIEWS: CPT | Mod: 26,RT

## 2021-01-20 PROCEDURE — 99284 EMERGENCY DEPT VISIT MOD MDM: CPT

## 2021-01-20 NOTE — ED PROVIDER NOTE - CLINICAL SUMMARY MEDICAL DECISION MAKING FREE TEXT BOX
89yo F BIBA from NH d/t bleeding from surgical site, pt is 2wks s/p R THR at [facility] w/ Dr Anderson. AFVSS. Pt well appearing, in NAD, exam as noted in PE. Plan: Obtain imaging R hip, consult Ortho. 89yo F BIBA from NH d/t bleeding from surgical site, pt is 2wks s/p R THR at [facility] w/ Dr Anderson. AFVSS. Pt well appearing, in NAD, exam as noted in PE. Plan: Obtain imaging R hip, consult Ortho. XR hip w/o acute pathology. Ortho eval'd pt in ED, removed 2 lower staples. Surgical site appears well healing. OK for return to NH. Pt should have remaining staples d/c'd in 1wk. Stable for d/c to NH. Pt instructed for f/u w/ Ortho, PCP. Return signs and symptoms discussed. Pt understands and agrees w/ this plan.

## 2021-01-20 NOTE — ED PROVIDER NOTE - PATIENT PORTAL LINK FT
You can access the FollowMyHealth Patient Portal offered by [facility] by registering at the following website: [email or web address]. By joining [employer]’s FollowMyHealth portal, you will also be able to view your health information using other applications (apps) compatible with our system.

## 2021-01-20 NOTE — CONSULT NOTE ADULT - ASSESSMENT
89F s/p R IMN by Dr. Anderson on 1/4/21 with proximal incision drainage    Plan:   XR right hip/femur ordered - pending  Proximal incision with minimal bloody drainage - will leave staples in for now. Redressed with gauze and tegaderm dressings. Plan for staple removal at nursing home in one week if wound appears well healing  Distal incisions appear well healing without drainage - staples removed from more proximal distal incision. Steristrips applied.   Continue pain control PRN  Continue DVT ppx as previously instructed  If Xrays show no acute injury and well appearing implant, no acute orthopedic surgical intervention indicated at this time and orthopedically stable for discharge. Patient to follow up with Dr. Anderson in 7-10 days for further evaluation and treatment.   Will discuss with attending Dr. Anderson and advise if any changes to treatment plan 89F s/p R IMN by Dr. Anderson on 1/4/21 with proximal incision drainage    Plan:   XR right hip/femur obtained/reviewed with IMN in good position  Proximal incision with minimal bloody drainage - will leave staples in for now. Redressed with gauze and tegaderm dressings. Plan for staple removal at nursing home in one week if wound appears well healing  Distal incisions appear well healing without drainage - staples removed from more proximal distal incision. Steristrips applied.   Continue pain control PRN  Continue DVT ppx as previously instructed  No acute orthopedic surgical intervention indicated at this time and orthopedically stable for discharge. Patient to follow up with Dr. Anderson in 7-10 days for further evaluation and treatment.   Will discuss with attending Dr. Anderson and advise if any changes to treatment plan

## 2021-01-20 NOTE — ED PROVIDER NOTE - PHYSICAL EXAMINATION
GEN: Awake, alert, interactive, NAD.  HEAD AND NECK: NC/AT. Airway patent. Neck supple.   EYES:  Clear b/l. EOMI. PERRL.   ENT: Moist mucus membranes.   CARDIAC: Regular rate, regular rhythm. No evident pedal edema.    RESP/CHEST: Normal respiratory effort with no use of accessory muscles or retractions. Clear throughout on auscultation.  ABD: Soft, non-distended, non-tender. No rebound, no guarding.   BACK: No midline spinal TTP. No CVAT.   EXTREMITIES: Moving all extremities with no apparent deformities.   SKIN: Well healing surgical incision R lateral upper thigh, staples in place. + mild oozing.  NEURO: AOx3, CN II-XII grossly intact, no focal deficits.   PSYCH: Appropriate mood and affect. GEN: Awake, alert, interactive, NAD.  HEAD AND NECK: NC/AT. Airway patent. Neck supple.   EYES:  Clear b/l. EOMI. PERRL.   ENT: Moist mucus membranes.   CARDIAC: Regular rate, regular rhythm. No evident pedal edema.    RESP/CHEST: Normal respiratory effort with no use of accessory muscles or retractions. Clear throughout on auscultation.  ABD: Soft, non-distended, non-tender. No rebound, no guarding.   BACK: No midline spinal TTP. No CVAT.   EXTREMITIES: Moving all extremities with no apparent deformities.   SKIN: Well healing surgical incision R lateral upper thigh, staples in place. + mild oozing of blood.   NEURO: AOx3, CN II-XII grossly intact, no focal deficits.   PSYCH: Appropriate mood and affect.

## 2021-01-20 NOTE — ED PROVIDER NOTE - PMH
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

## 2021-01-20 NOTE — ED PROVIDER NOTE - OBJECTIVE STATEMENT
89yo F BIBA from NH d/t bleeding from surgical site. Pt is 2wks s/p R THR at [facility] w/ Dr Anderson. Per NH, bleeding onset today. ROS otherwise negative.    PSH as above, PMH CVA, HTN, Afib, NKDA, meds as listed

## 2021-01-20 NOTE — CONSULT NOTE ADULT - SUBJECTIVE AND OBJECTIVE BOX
89F s/p R IMN by Dr. Anderson on 1/4/21 who presents to the [facility] from nursing home for wound evaluation. Per nursing home, patient had onset of bleeding from proximal incision today. Patient denies any acute complaints at this time. Otherwise no recent falls/trauma, no fever/chills, no purulent drainage, no other acute orthopedic complaints.     Vital Signs Last 24 Hrs  T(C): 36.7 (20 Jan 2021 11:59), Max: 36.7 (20 Jan 2021 11:59)  T(F): 98 (20 Jan 2021 11:59), Max: 98 (20 Jan 2021 11:59)  HR: 62 (20 Jan 2021 11:59) (62 - 62)  BP: 117/72 (20 Jan 2021 11:59) (117/72 - 117/72)  BP(mean): --  RR: 17 (20 Jan 2021 11:59) (17 - 17)  SpO2: 96% (20 Jan 2021 11:59) (96% - 96%)    RLE:   Proximal incision well appearing with staples in place, mild serosanguinous drainage from distal portion of wound. Mild surrounding erythema. No purulent drainage.   Distal incisions well healing without drainage/erythema/other signs of infection. Staples in place over more proximal distal incision, no staples in most distal incision.  Mild TTP surrounding incision sites. No other bony TTP on exam.   FROM of hip/knee/ankle without discomfort.  SILT, +Q/H/Gsc/TA/EHL/FHL   DP pulses intact  Compartments soft and compressible  No calf tenderness bilaterally    XR R hip/femur ordered and pending. 89F s/p R IMN by Dr. Anderson on 1/4/21 who presents to the [facility] from nursing home for wound evaluation. Per nursing home, patient had onset of bleeding from proximal incision today. Patient denies any acute complaints at this time. Otherwise no recent falls/trauma, no fever/chills, no purulent drainage, no other acute orthopedic complaints.     Vital Signs Last 24 Hrs  T(C): 36.7 (20 Jan 2021 11:59), Max: 36.7 (20 Jan 2021 11:59)  T(F): 98 (20 Jan 2021 11:59), Max: 98 (20 Jan 2021 11:59)  HR: 62 (20 Jan 2021 11:59) (62 - 62)  BP: 117/72 (20 Jan 2021 11:59) (117/72 - 117/72)  BP(mean): --  RR: 17 (20 Jan 2021 11:59) (17 - 17)  SpO2: 96% (20 Jan 2021 11:59) (96% - 96%)    RLE:   Proximal incision well appearing with staples in place, mild serosanguinous drainage from distal portion of wound. Mild surrounding erythema. No purulent drainage.   Distal incisions well healing without drainage/erythema/other signs of infection. Staples in place over more proximal distal incision, no staples in most distal incision.  Mild TTP surrounding incision sites. No other bony TTP on exam.   FROM of hip/knee/ankle without discomfort.  SILT, +Q/H/Gsc/TA/EHL/FHL   DP pulses intact  Compartments soft and compressible  No calf tenderness bilaterally    XR R hip/femur obtained and reviewed.

## 2021-01-20 NOTE — ED PROVIDER NOTE - CARE PROVIDER_API CALL
Sam Anderson (DO)  Orthopaedic Surgery  [address] [place] [address]  Phone: [phone]  Fax: [phone]  Follow Up Time: 7-10 Days

## 2021-01-20 NOTE — ED PROVIDER NOTE - PROGRESS NOTE DETAILS
Ortho will see pt in ED Ortho removed bottom 2 staples, OK for return to NH, top staples should be d/c'd in 1wk

## 2022-01-01 ENCOUNTER — EMERGENCY (EMERGENCY)
Facility: HOSPITAL | Age: 87
LOS: 0 days | Discharge: ANOTHER HOSPITAL | End: 2022-06-10
Attending: EMERGENCY MEDICINE
Payer: MEDICARE

## 2022-01-01 ENCOUNTER — INPATIENT (INPATIENT)
Facility: HOSPITAL | Age: 87
LOS: 6 days | Discharge: HOME HEALTH | End: 2022-01-19
Attending: INTERNAL MEDICINE | Admitting: INTERNAL MEDICINE
Payer: MEDICARE

## 2022-01-01 ENCOUNTER — INPATIENT (INPATIENT)
Facility: HOSPITAL | Age: 87
LOS: 8 days | DRG: 299 | End: 2022-06-19
Attending: INTERNAL MEDICINE | Admitting: HOSPITALIST
Payer: MEDICARE

## 2022-01-01 VITALS
SYSTOLIC BLOOD PRESSURE: 120 MMHG | OXYGEN SATURATION: 87 % | DIASTOLIC BLOOD PRESSURE: 79 MMHG | HEART RATE: 125 BPM | TEMPERATURE: 98 F | RESPIRATION RATE: 24 BRPM

## 2022-01-01 VITALS
SYSTOLIC BLOOD PRESSURE: 138 MMHG | HEART RATE: 152 BPM | OXYGEN SATURATION: 95 % | DIASTOLIC BLOOD PRESSURE: 110 MMHG | HEIGHT: 63 IN | RESPIRATION RATE: 26 BRPM | WEIGHT: 115.08 LBS

## 2022-01-01 VITALS
HEIGHT: 67 IN | DIASTOLIC BLOOD PRESSURE: 130 MMHG | RESPIRATION RATE: 19 BRPM | WEIGHT: 134.92 LBS | TEMPERATURE: 97 F | OXYGEN SATURATION: 95 % | HEART RATE: 120 BPM | SYSTOLIC BLOOD PRESSURE: 184 MMHG

## 2022-01-01 VITALS
SYSTOLIC BLOOD PRESSURE: 126 MMHG | TEMPERATURE: 100 F | RESPIRATION RATE: 18 BRPM | HEART RATE: 120 BPM | DIASTOLIC BLOOD PRESSURE: 90 MMHG | OXYGEN SATURATION: 96 %

## 2022-01-01 VITALS
HEART RATE: 146 BPM | WEIGHT: 115.08 LBS | HEIGHT: 63 IN | RESPIRATION RATE: 20 BRPM | SYSTOLIC BLOOD PRESSURE: 147 MMHG | TEMPERATURE: 98 F | DIASTOLIC BLOOD PRESSURE: 65 MMHG | OXYGEN SATURATION: 88 %

## 2022-01-01 VITALS
TEMPERATURE: 98 F | HEART RATE: 86 BPM | RESPIRATION RATE: 20 BRPM | OXYGEN SATURATION: 97 % | SYSTOLIC BLOOD PRESSURE: 157 MMHG | DIASTOLIC BLOOD PRESSURE: 85 MMHG

## 2022-01-01 DIAGNOSIS — I70.222 ATHEROSCLEROSIS OF NATIVE ARTERIES OF EXTREMITIES WITH REST PAIN, LEFT LEG: ICD-10-CM

## 2022-01-01 DIAGNOSIS — E87.6 HYPOKALEMIA: ICD-10-CM

## 2022-01-01 DIAGNOSIS — I48.91 UNSPECIFIED ATRIAL FIBRILLATION: ICD-10-CM

## 2022-01-01 DIAGNOSIS — G93.49 OTHER ENCEPHALOPATHY: ICD-10-CM

## 2022-01-01 DIAGNOSIS — I63.9 CEREBRAL INFARCTION, UNSPECIFIED: ICD-10-CM

## 2022-01-01 DIAGNOSIS — R77.8 OTHER SPECIFIED ABNORMALITIES OF PLASMA PROTEINS: ICD-10-CM

## 2022-01-01 DIAGNOSIS — E78.5 HYPERLIPIDEMIA, UNSPECIFIED: ICD-10-CM

## 2022-01-01 DIAGNOSIS — F03.90 UNSPECIFIED DEMENTIA WITHOUT BEHAVIORAL DISTURBANCE: ICD-10-CM

## 2022-01-01 DIAGNOSIS — Z51.5 ENCOUNTER FOR PALLIATIVE CARE: ICD-10-CM

## 2022-01-01 DIAGNOSIS — Z86.73 PERSONAL HISTORY OF TRANSIENT ISCHEMIC ATTACK (TIA), AND CEREBRAL INFARCTION WITHOUT RESIDUAL DEFICITS: ICD-10-CM

## 2022-01-01 DIAGNOSIS — R06.00 DYSPNEA, UNSPECIFIED: ICD-10-CM

## 2022-01-01 DIAGNOSIS — I10 ESSENTIAL (PRIMARY) HYPERTENSION: ICD-10-CM

## 2022-01-01 DIAGNOSIS — Z79.82 LONG TERM (CURRENT) USE OF ASPIRIN: ICD-10-CM

## 2022-01-01 DIAGNOSIS — I99.8 OTHER DISORDER OF CIRCULATORY SYSTEM: ICD-10-CM

## 2022-01-01 DIAGNOSIS — N39.0 URINARY TRACT INFECTION, SITE NOT SPECIFIED: ICD-10-CM

## 2022-01-01 DIAGNOSIS — Z20.822 CONTACT WITH AND (SUSPECTED) EXPOSURE TO COVID-19: ICD-10-CM

## 2022-01-01 DIAGNOSIS — N17.9 ACUTE KIDNEY FAILURE, UNSPECIFIED: ICD-10-CM

## 2022-01-01 DIAGNOSIS — Z79.01 LONG TERM (CURRENT) USE OF ANTICOAGULANTS: ICD-10-CM

## 2022-01-01 DIAGNOSIS — Z74.01 BED CONFINEMENT STATUS: ICD-10-CM

## 2022-01-01 DIAGNOSIS — Z71.89 OTHER SPECIFIED COUNSELING: ICD-10-CM

## 2022-01-01 DIAGNOSIS — I25.10 ATHEROSCLEROTIC HEART DISEASE OF NATIVE CORONARY ARTERY WITHOUT ANGINA PECTORIS: ICD-10-CM

## 2022-01-01 DIAGNOSIS — R52 PAIN, UNSPECIFIED: ICD-10-CM

## 2022-01-01 DIAGNOSIS — R29.810 FACIAL WEAKNESS: ICD-10-CM

## 2022-01-01 DIAGNOSIS — G81.91 HEMIPLEGIA, UNSPECIFIED AFFECTING RIGHT DOMINANT SIDE: ICD-10-CM

## 2022-01-01 DIAGNOSIS — F03.90 UNSPECIFIED DEMENTIA, UNSPECIFIED SEVERITY, WITHOUT BEHAVIORAL DISTURBANCE, PSYCHOTIC DISTURBANCE, MOOD DISTURBANCE, AND ANXIETY: ICD-10-CM

## 2022-01-01 DIAGNOSIS — F05 DELIRIUM DUE TO KNOWN PHYSIOLOGICAL CONDITION: ICD-10-CM

## 2022-01-01 DIAGNOSIS — R29.706 NIHSS SCORE 6: ICD-10-CM

## 2022-01-01 DIAGNOSIS — Z91.14 PATIENT'S OTHER NONCOMPLIANCE WITH MEDICATION REGIMEN: ICD-10-CM

## 2022-01-01 DIAGNOSIS — I50.9 HEART FAILURE, UNSPECIFIED: ICD-10-CM

## 2022-01-01 DIAGNOSIS — R53.2 FUNCTIONAL QUADRIPLEGIA: ICD-10-CM

## 2022-01-01 DIAGNOSIS — I24.8 OTHER FORMS OF ACUTE ISCHEMIC HEART DISEASE: ICD-10-CM

## 2022-01-01 DIAGNOSIS — R23.0 CYANOSIS: ICD-10-CM

## 2022-01-01 DIAGNOSIS — Z29.9 ENCOUNTER FOR PROPHYLACTIC MEASURES, UNSPECIFIED: ICD-10-CM

## 2022-01-01 DIAGNOSIS — I48.0 PAROXYSMAL ATRIAL FIBRILLATION: ICD-10-CM

## 2022-01-01 LAB
-  AMIKACIN: SIGNIFICANT CHANGE UP
-  AMOXICILLIN/CLAVULANIC ACID: SIGNIFICANT CHANGE UP
-  AMPICILLIN/SULBACTAM: SIGNIFICANT CHANGE UP
-  AMPICILLIN: SIGNIFICANT CHANGE UP
-  AZTREONAM: SIGNIFICANT CHANGE UP
-  CEFAZOLIN: SIGNIFICANT CHANGE UP
-  CEFEPIME: SIGNIFICANT CHANGE UP
-  CEFOXITIN: SIGNIFICANT CHANGE UP
-  CEFTRIAXONE: SIGNIFICANT CHANGE UP
-  CIPROFLOXACIN: SIGNIFICANT CHANGE UP
-  ERTAPENEM: SIGNIFICANT CHANGE UP
-  GENTAMICIN: SIGNIFICANT CHANGE UP
-  IMIPENEM: SIGNIFICANT CHANGE UP
-  LEVOFLOXACIN: SIGNIFICANT CHANGE UP
-  MEROPENEM: SIGNIFICANT CHANGE UP
-  NITROFURANTOIN: SIGNIFICANT CHANGE UP
-  PIPERACILLIN/TAZOBACTAM: SIGNIFICANT CHANGE UP
-  TIGECYCLINE: SIGNIFICANT CHANGE UP
-  TOBRAMYCIN: SIGNIFICANT CHANGE UP
-  TRIMETHOPRIM/SULFAMETHOXAZOLE: SIGNIFICANT CHANGE UP
A1C WITH ESTIMATED AVERAGE GLUCOSE RESULT: 5.6 % — SIGNIFICANT CHANGE UP (ref 4–5.6)
ALBUMIN SERPL ELPH-MCNC: 2.4 G/DL — LOW (ref 3.3–5)
ALBUMIN SERPL ELPH-MCNC: 2.7 G/DL — LOW (ref 3.3–5)
ALBUMIN SERPL ELPH-MCNC: 2.7 G/DL — LOW (ref 3.3–5)
ALBUMIN SERPL ELPH-MCNC: 2.8 G/DL — LOW (ref 3.3–5)
ALBUMIN SERPL ELPH-MCNC: 3.3 G/DL — SIGNIFICANT CHANGE UP (ref 3.3–5)
ALBUMIN SERPL ELPH-MCNC: 3.4 G/DL — SIGNIFICANT CHANGE UP (ref 3.3–5)
ALBUMIN SERPL ELPH-MCNC: 3.4 G/DL — SIGNIFICANT CHANGE UP (ref 3.3–5)
ALP SERPL-CCNC: 68 U/L — SIGNIFICANT CHANGE UP (ref 40–120)
ALP SERPL-CCNC: 82 U/L — SIGNIFICANT CHANGE UP (ref 40–120)
ALP SERPL-CCNC: 84 U/L — SIGNIFICANT CHANGE UP (ref 40–120)
ALP SERPL-CCNC: 85 U/L — SIGNIFICANT CHANGE UP (ref 40–120)
ALP SERPL-CCNC: 85 U/L — SIGNIFICANT CHANGE UP (ref 40–120)
ALP SERPL-CCNC: 86 U/L — SIGNIFICANT CHANGE UP (ref 40–120)
ALP SERPL-CCNC: 89 U/L — SIGNIFICANT CHANGE UP (ref 40–120)
ALT FLD-CCNC: 21 U/L — SIGNIFICANT CHANGE UP (ref 12–78)
ALT FLD-CCNC: 22 U/L — SIGNIFICANT CHANGE UP (ref 12–78)
ALT FLD-CCNC: 23 U/L — SIGNIFICANT CHANGE UP (ref 12–78)
ALT FLD-CCNC: 29 U/L — SIGNIFICANT CHANGE UP (ref 12–78)
ALT FLD-CCNC: 35 U/L — SIGNIFICANT CHANGE UP (ref 10–45)
ALT FLD-CCNC: 46 U/L — HIGH (ref 10–45)
ALT FLD-CCNC: 59 U/L — SIGNIFICANT CHANGE UP (ref 12–78)
ANION GAP SERPL CALC-SCNC: 10 MMOL/L — SIGNIFICANT CHANGE UP (ref 5–17)
ANION GAP SERPL CALC-SCNC: 10 MMOL/L — SIGNIFICANT CHANGE UP (ref 5–17)
ANION GAP SERPL CALC-SCNC: 11 MMOL/L — SIGNIFICANT CHANGE UP (ref 5–17)
ANION GAP SERPL CALC-SCNC: 13 MMOL/L — SIGNIFICANT CHANGE UP (ref 5–17)
ANION GAP SERPL CALC-SCNC: 4 MMOL/L — LOW (ref 5–17)
ANION GAP SERPL CALC-SCNC: 4 MMOL/L — LOW (ref 5–17)
ANION GAP SERPL CALC-SCNC: 5 MMOL/L — SIGNIFICANT CHANGE UP (ref 5–17)
ANION GAP SERPL CALC-SCNC: 8 MMOL/L — SIGNIFICANT CHANGE UP (ref 5–17)
ANION GAP SERPL CALC-SCNC: 9 MMOL/L — SIGNIFICANT CHANGE UP (ref 5–17)
APPEARANCE UR: ABNORMAL
APPEARANCE UR: CLEAR — SIGNIFICANT CHANGE UP
APTT BLD: 101.3 SEC — HIGH (ref 27.5–35.5)
APTT BLD: 101.7 SEC — HIGH (ref 27.5–35.5)
APTT BLD: 173.2 SEC — CRITICAL HIGH (ref 27.5–35.5)
APTT BLD: 29.3 SEC — SIGNIFICANT CHANGE UP (ref 27.5–35.5)
APTT BLD: 31.1 SEC — SIGNIFICANT CHANGE UP (ref 27.5–35.5)
APTT BLD: 57.1 SEC — HIGH (ref 27.5–35.5)
APTT BLD: 64.7 SEC — HIGH (ref 27.5–35.5)
APTT BLD: 68 SEC — HIGH (ref 27.5–35.5)
AST SERPL-CCNC: 115 U/L — HIGH (ref 15–37)
AST SERPL-CCNC: 22 U/L — SIGNIFICANT CHANGE UP (ref 15–37)
AST SERPL-CCNC: 27 U/L — SIGNIFICANT CHANGE UP (ref 15–37)
AST SERPL-CCNC: 36 U/L — SIGNIFICANT CHANGE UP (ref 15–37)
AST SERPL-CCNC: 48 U/L — HIGH (ref 10–40)
AST SERPL-CCNC: 48 U/L — HIGH (ref 15–37)
AST SERPL-CCNC: 71 U/L — HIGH (ref 10–40)
BACTERIA # UR AUTO: ABNORMAL
BACTERIA # UR AUTO: ABNORMAL
BASE EXCESS BLDV CALC-SCNC: -0.2 MMOL/L — SIGNIFICANT CHANGE UP (ref -2–2)
BASE EXCESS BLDV CALC-SCNC: -0.7 MMOL/L — SIGNIFICANT CHANGE UP (ref -2–2)
BASE EXCESS BLDV CALC-SCNC: 0.8 MMOL/L — SIGNIFICANT CHANGE UP (ref -2–2)
BASOPHILS # BLD AUTO: 0.03 K/UL — SIGNIFICANT CHANGE UP (ref 0–0.2)
BASOPHILS # BLD AUTO: 0.04 K/UL — SIGNIFICANT CHANGE UP (ref 0–0.2)
BASOPHILS # BLD AUTO: 0.04 K/UL — SIGNIFICANT CHANGE UP (ref 0–0.2)
BASOPHILS NFR BLD AUTO: 0.2 % — SIGNIFICANT CHANGE UP (ref 0–2)
BASOPHILS NFR BLD AUTO: 0.2 % — SIGNIFICANT CHANGE UP (ref 0–2)
BASOPHILS NFR BLD AUTO: 0.5 % — SIGNIFICANT CHANGE UP (ref 0–2)
BILIRUB DIRECT SERPL-MCNC: 0.4 MG/DL — HIGH (ref 0–0.3)
BILIRUB INDIRECT FLD-MCNC: 1.3 MG/DL — HIGH (ref 0.2–1)
BILIRUB SERPL-MCNC: 0.6 MG/DL — SIGNIFICANT CHANGE UP (ref 0.2–1.2)
BILIRUB SERPL-MCNC: 0.7 MG/DL — SIGNIFICANT CHANGE UP (ref 0.2–1.2)
BILIRUB SERPL-MCNC: 0.8 MG/DL — SIGNIFICANT CHANGE UP (ref 0.2–1.2)
BILIRUB SERPL-MCNC: 0.8 MG/DL — SIGNIFICANT CHANGE UP (ref 0.2–1.2)
BILIRUB SERPL-MCNC: 1 MG/DL — SIGNIFICANT CHANGE UP (ref 0.2–1.2)
BILIRUB SERPL-MCNC: 1.7 MG/DL — HIGH (ref 0.2–1.2)
BILIRUB SERPL-MCNC: 1.8 MG/DL — HIGH (ref 0.2–1.2)
BILIRUB UR-MCNC: NEGATIVE — SIGNIFICANT CHANGE UP
BILIRUB UR-MCNC: NEGATIVE — SIGNIFICANT CHANGE UP
BLD GP AB SCN SERPL QL: NEGATIVE — SIGNIFICANT CHANGE UP
BLD GP AB SCN SERPL QL: NEGATIVE — SIGNIFICANT CHANGE UP
BLD GP AB SCN SERPL QL: SIGNIFICANT CHANGE UP
BUN SERPL-MCNC: 24 MG/DL — HIGH (ref 7–23)
BUN SERPL-MCNC: 26 MG/DL — HIGH (ref 7–23)
BUN SERPL-MCNC: 35 MG/DL — HIGH (ref 7–23)
BUN SERPL-MCNC: 35 MG/DL — HIGH (ref 7–23)
BUN SERPL-MCNC: 36 MG/DL — HIGH (ref 7–23)
BUN SERPL-MCNC: 38 MG/DL — HIGH (ref 7–23)
BUN SERPL-MCNC: 42 MG/DL — HIGH (ref 7–23)
BUN SERPL-MCNC: 48 MG/DL — HIGH (ref 7–23)
BUN SERPL-MCNC: 49 MG/DL — HIGH (ref 7–23)
BUN SERPL-MCNC: 58 MG/DL — HIGH (ref 7–23)
BUN SERPL-MCNC: 59 MG/DL — HIGH (ref 7–23)
CA-I SERPL-SCNC: 1.19 MMOL/L — SIGNIFICANT CHANGE UP (ref 1.15–1.33)
CA-I SERPL-SCNC: 1.22 MMOL/L — SIGNIFICANT CHANGE UP (ref 1.15–1.33)
CA-I SERPL-SCNC: 1.25 MMOL/L — SIGNIFICANT CHANGE UP (ref 1.15–1.33)
CALCIUM SERPL-MCNC: 8.5 MG/DL — SIGNIFICANT CHANGE UP (ref 8.4–10.5)
CALCIUM SERPL-MCNC: 8.7 MG/DL — SIGNIFICANT CHANGE UP (ref 8.4–10.5)
CALCIUM SERPL-MCNC: 8.8 MG/DL — SIGNIFICANT CHANGE UP (ref 8.4–10.5)
CALCIUM SERPL-MCNC: 9.1 MG/DL — SIGNIFICANT CHANGE UP (ref 8.5–10.1)
CALCIUM SERPL-MCNC: 9.2 MG/DL — SIGNIFICANT CHANGE UP (ref 8.5–10.1)
CALCIUM SERPL-MCNC: 9.2 MG/DL — SIGNIFICANT CHANGE UP (ref 8.5–10.1)
CALCIUM SERPL-MCNC: 9.3 MG/DL — SIGNIFICANT CHANGE UP (ref 8.5–10.1)
CALCIUM SERPL-MCNC: 9.3 MG/DL — SIGNIFICANT CHANGE UP (ref 8.5–10.1)
CALCIUM SERPL-MCNC: 9.4 MG/DL — SIGNIFICANT CHANGE UP (ref 8.5–10.1)
CALCIUM SERPL-MCNC: 9.6 MG/DL — SIGNIFICANT CHANGE UP (ref 8.5–10.1)
CALCIUM SERPL-MCNC: 9.7 MG/DL — SIGNIFICANT CHANGE UP (ref 8.5–10.1)
CHLORIDE BLDV-SCNC: 109 MMOL/L — HIGH (ref 96–108)
CHLORIDE BLDV-SCNC: 111 MMOL/L — HIGH (ref 96–108)
CHLORIDE BLDV-SCNC: 111 MMOL/L — HIGH (ref 96–108)
CHLORIDE SERPL-SCNC: 100 MMOL/L — SIGNIFICANT CHANGE UP (ref 96–108)
CHLORIDE SERPL-SCNC: 100 MMOL/L — SIGNIFICANT CHANGE UP (ref 96–108)
CHLORIDE SERPL-SCNC: 101 MMOL/L — SIGNIFICANT CHANGE UP (ref 96–108)
CHLORIDE SERPL-SCNC: 103 MMOL/L — SIGNIFICANT CHANGE UP (ref 96–108)
CHLORIDE SERPL-SCNC: 108 MMOL/L — SIGNIFICANT CHANGE UP (ref 96–108)
CHLORIDE SERPL-SCNC: 110 MMOL/L — HIGH (ref 96–108)
CHLORIDE SERPL-SCNC: 111 MMOL/L — HIGH (ref 96–108)
CHLORIDE SERPL-SCNC: 112 MMOL/L — HIGH (ref 96–108)
CHLORIDE SERPL-SCNC: 114 MMOL/L — HIGH (ref 96–108)
CHLORIDE SERPL-SCNC: 117 MMOL/L — HIGH (ref 96–108)
CHLORIDE SERPL-SCNC: 99 MMOL/L — SIGNIFICANT CHANGE UP (ref 96–108)
CHOLEST SERPL-MCNC: 213 MG/DL — HIGH
CK MB BLD-MCNC: 1.4 % — SIGNIFICANT CHANGE UP (ref 0–3.5)
CK MB CFR SERPL CALC: 10.3 NG/ML — HIGH (ref 0–3.8)
CK MB CFR SERPL CALC: 16.2 NG/ML — HIGH (ref 0–3.8)
CK MB CFR SERPL CALC: 2.3 NG/ML — SIGNIFICANT CHANGE UP (ref 0.5–3.6)
CK MB CFR SERPL CALC: 8 NG/ML — HIGH (ref 0.5–3.6)
CK SERPL-CCNC: 166 U/L — SIGNIFICANT CHANGE UP (ref 26–192)
CO2 BLDV-SCNC: 25 MMOL/L — SIGNIFICANT CHANGE UP (ref 22–26)
CO2 BLDV-SCNC: 28 MMOL/L — HIGH (ref 22–26)
CO2 BLDV-SCNC: 29 MMOL/L — HIGH (ref 22–26)
CO2 SERPL-SCNC: 22 MMOL/L — SIGNIFICANT CHANGE UP (ref 22–31)
CO2 SERPL-SCNC: 22 MMOL/L — SIGNIFICANT CHANGE UP (ref 22–31)
CO2 SERPL-SCNC: 23 MMOL/L — SIGNIFICANT CHANGE UP (ref 22–31)
CO2 SERPL-SCNC: 24 MMOL/L — SIGNIFICANT CHANGE UP (ref 22–31)
CO2 SERPL-SCNC: 26 MMOL/L — SIGNIFICANT CHANGE UP (ref 22–31)
CO2 SERPL-SCNC: 27 MMOL/L — SIGNIFICANT CHANGE UP (ref 22–31)
CO2 SERPL-SCNC: 29 MMOL/L — SIGNIFICANT CHANGE UP (ref 22–31)
CO2 SERPL-SCNC: 31 MMOL/L — SIGNIFICANT CHANGE UP (ref 22–31)
COLOR SPEC: YELLOW — SIGNIFICANT CHANGE UP
COLOR SPEC: YELLOW — SIGNIFICANT CHANGE UP
CREAT SERPL-MCNC: 0.99 MG/DL — SIGNIFICANT CHANGE UP (ref 0.5–1.3)
CREAT SERPL-MCNC: 1.09 MG/DL — SIGNIFICANT CHANGE UP (ref 0.5–1.3)
CREAT SERPL-MCNC: 1.13 MG/DL — SIGNIFICANT CHANGE UP (ref 0.5–1.3)
CREAT SERPL-MCNC: 1.17 MG/DL — SIGNIFICANT CHANGE UP (ref 0.5–1.3)
CREAT SERPL-MCNC: 1.21 MG/DL — SIGNIFICANT CHANGE UP (ref 0.5–1.3)
CREAT SERPL-MCNC: 1.29 MG/DL — SIGNIFICANT CHANGE UP (ref 0.5–1.3)
CREAT SERPL-MCNC: 1.35 MG/DL — HIGH (ref 0.5–1.3)
CREAT SERPL-MCNC: 1.38 MG/DL — HIGH (ref 0.5–1.3)
CREAT SERPL-MCNC: 1.41 MG/DL — HIGH (ref 0.5–1.3)
CREAT SERPL-MCNC: 1.7 MG/DL — HIGH (ref 0.5–1.3)
CREAT SERPL-MCNC: 1.95 MG/DL — HIGH (ref 0.5–1.3)
CULTURE RESULTS: NO GROWTH — SIGNIFICANT CHANGE UP
CULTURE RESULTS: SIGNIFICANT CHANGE UP
DIFF PNL FLD: ABNORMAL
DIFF PNL FLD: ABNORMAL
DIGOXIN SERPL-MCNC: 0.17 NG/ML — LOW (ref 0.8–2)
DIGOXIN SERPL-MCNC: <0.4 NG/ML — LOW (ref 0.8–2)
EGFR: 35 ML/MIN/1.73M2 — LOW
EGFR: 45 ML/MIN/1.73M2 — LOW
EGFR: 47 ML/MIN/1.73M2 — LOW
EGFR: 53 ML/MIN/1.73M2 — LOW
EOSINOPHIL # BLD AUTO: 0 K/UL — SIGNIFICANT CHANGE UP (ref 0–0.5)
EOSINOPHIL # BLD AUTO: 0.01 K/UL — SIGNIFICANT CHANGE UP (ref 0–0.5)
EOSINOPHIL # BLD AUTO: 0.05 K/UL — SIGNIFICANT CHANGE UP (ref 0–0.5)
EOSINOPHIL NFR BLD AUTO: 0 % — SIGNIFICANT CHANGE UP (ref 0–6)
EOSINOPHIL NFR BLD AUTO: 0 % — SIGNIFICANT CHANGE UP (ref 0–6)
EOSINOPHIL NFR BLD AUTO: 0.8 % — SIGNIFICANT CHANGE UP (ref 0–6)
EPI CELLS # UR: 1 /HPF — SIGNIFICANT CHANGE UP
EPI CELLS # UR: SIGNIFICANT CHANGE UP
ESTIMATED AVERAGE GLUCOSE: 114 MG/DL — SIGNIFICANT CHANGE UP (ref 68–114)
FLUAV AG NPH QL: SIGNIFICANT CHANGE UP
FLUBV AG NPH QL: SIGNIFICANT CHANGE UP
GAS PNL BLDV: 141 MMOL/L — SIGNIFICANT CHANGE UP (ref 136–145)
GAS PNL BLDV: 142 MMOL/L — SIGNIFICANT CHANGE UP (ref 136–145)
GAS PNL BLDV: 146 MMOL/L — HIGH (ref 136–145)
GAS PNL BLDV: SIGNIFICANT CHANGE UP
GLUCOSE BLDC GLUCOMTR-MCNC: 101 MG/DL — HIGH (ref 70–99)
GLUCOSE BLDC GLUCOMTR-MCNC: 109 MG/DL — HIGH (ref 70–99)
GLUCOSE BLDC GLUCOMTR-MCNC: 110 MG/DL — HIGH (ref 70–99)
GLUCOSE BLDC GLUCOMTR-MCNC: 110 MG/DL — HIGH (ref 70–99)
GLUCOSE BLDC GLUCOMTR-MCNC: 111 MG/DL — HIGH (ref 70–99)
GLUCOSE BLDC GLUCOMTR-MCNC: 113 MG/DL — HIGH (ref 70–99)
GLUCOSE BLDC GLUCOMTR-MCNC: 117 MG/DL — HIGH (ref 70–99)
GLUCOSE BLDC GLUCOMTR-MCNC: 118 MG/DL — HIGH (ref 70–99)
GLUCOSE BLDC GLUCOMTR-MCNC: 120 MG/DL — HIGH (ref 70–99)
GLUCOSE BLDC GLUCOMTR-MCNC: 120 MG/DL — HIGH (ref 70–99)
GLUCOSE BLDC GLUCOMTR-MCNC: 121 MG/DL — HIGH (ref 70–99)
GLUCOSE BLDC GLUCOMTR-MCNC: 121 MG/DL — HIGH (ref 70–99)
GLUCOSE BLDC GLUCOMTR-MCNC: 130 MG/DL — HIGH (ref 70–99)
GLUCOSE BLDC GLUCOMTR-MCNC: 131 MG/DL — HIGH (ref 70–99)
GLUCOSE BLDC GLUCOMTR-MCNC: 138 MG/DL — HIGH (ref 70–99)
GLUCOSE BLDC GLUCOMTR-MCNC: 150 MG/DL — HIGH (ref 70–99)
GLUCOSE BLDC GLUCOMTR-MCNC: 192 MG/DL — HIGH (ref 70–99)
GLUCOSE BLDC GLUCOMTR-MCNC: 82 MG/DL — SIGNIFICANT CHANGE UP (ref 70–99)
GLUCOSE BLDC GLUCOMTR-MCNC: 83 MG/DL — SIGNIFICANT CHANGE UP (ref 70–99)
GLUCOSE BLDC GLUCOMTR-MCNC: 89 MG/DL — SIGNIFICANT CHANGE UP (ref 70–99)
GLUCOSE BLDC GLUCOMTR-MCNC: 94 MG/DL — SIGNIFICANT CHANGE UP (ref 70–99)
GLUCOSE BLDC GLUCOMTR-MCNC: 94 MG/DL — SIGNIFICANT CHANGE UP (ref 70–99)
GLUCOSE BLDV-MCNC: 108 MG/DL — HIGH (ref 70–99)
GLUCOSE BLDV-MCNC: 130 MG/DL — HIGH (ref 70–99)
GLUCOSE BLDV-MCNC: 94 MG/DL — SIGNIFICANT CHANGE UP (ref 70–99)
GLUCOSE SERPL-MCNC: 102 MG/DL — HIGH (ref 70–99)
GLUCOSE SERPL-MCNC: 102 MG/DL — HIGH (ref 70–99)
GLUCOSE SERPL-MCNC: 103 MG/DL — HIGH (ref 70–99)
GLUCOSE SERPL-MCNC: 112 MG/DL — HIGH (ref 70–99)
GLUCOSE SERPL-MCNC: 114 MG/DL — HIGH (ref 70–99)
GLUCOSE SERPL-MCNC: 122 MG/DL — HIGH (ref 70–99)
GLUCOSE SERPL-MCNC: 126 MG/DL — HIGH (ref 70–99)
GLUCOSE SERPL-MCNC: 129 MG/DL — HIGH (ref 70–99)
GLUCOSE SERPL-MCNC: 92 MG/DL — SIGNIFICANT CHANGE UP (ref 70–99)
GLUCOSE UR QL: NEGATIVE MG/DL — SIGNIFICANT CHANGE UP
GLUCOSE UR QL: NEGATIVE — SIGNIFICANT CHANGE UP
HCO3 BLDV-SCNC: 24 MMOL/L — SIGNIFICANT CHANGE UP (ref 22–29)
HCO3 BLDV-SCNC: 27 MMOL/L — SIGNIFICANT CHANGE UP (ref 22–29)
HCO3 BLDV-SCNC: 27 MMOL/L — SIGNIFICANT CHANGE UP (ref 22–29)
HCT VFR BLD CALC: 36.8 % — SIGNIFICANT CHANGE UP (ref 34.5–45)
HCT VFR BLD CALC: 40.3 % — SIGNIFICANT CHANGE UP (ref 34.5–45)
HCT VFR BLD CALC: 43 % — SIGNIFICANT CHANGE UP (ref 34.5–45)
HCT VFR BLD CALC: 44.1 % — SIGNIFICANT CHANGE UP (ref 34.5–45)
HCT VFR BLD CALC: 44.3 % — SIGNIFICANT CHANGE UP (ref 34.5–45)
HCT VFR BLD CALC: 44.7 % — SIGNIFICANT CHANGE UP (ref 34.5–45)
HCT VFR BLD CALC: 45.7 % — HIGH (ref 34.5–45)
HCT VFR BLD CALC: 47.9 % — HIGH (ref 34.5–45)
HCT VFR BLD CALC: 49.5 % — HIGH (ref 34.5–45)
HCT VFR BLDA CALC: 39 % — SIGNIFICANT CHANGE UP (ref 34.5–46.5)
HCT VFR BLDA CALC: 40 % — SIGNIFICANT CHANGE UP (ref 34.5–46.5)
HCT VFR BLDA CALC: 42 % — SIGNIFICANT CHANGE UP (ref 34.5–46.5)
HDLC SERPL-MCNC: 45 MG/DL — LOW
HGB BLD CALC-MCNC: 13 G/DL — SIGNIFICANT CHANGE UP (ref 11.7–16.1)
HGB BLD CALC-MCNC: 13.2 G/DL — SIGNIFICANT CHANGE UP (ref 11.7–16.1)
HGB BLD CALC-MCNC: 13.9 G/DL — SIGNIFICANT CHANGE UP (ref 11.7–16.1)
HGB BLD-MCNC: 11.2 G/DL — LOW (ref 11.5–15.5)
HGB BLD-MCNC: 12.1 G/DL — SIGNIFICANT CHANGE UP (ref 11.5–15.5)
HGB BLD-MCNC: 13.3 G/DL — SIGNIFICANT CHANGE UP (ref 11.5–15.5)
HGB BLD-MCNC: 13.4 G/DL — SIGNIFICANT CHANGE UP (ref 11.5–15.5)
HGB BLD-MCNC: 14 G/DL — SIGNIFICANT CHANGE UP (ref 11.5–15.5)
HGB BLD-MCNC: 14.8 G/DL — SIGNIFICANT CHANGE UP (ref 11.5–15.5)
HGB BLD-MCNC: 14.9 G/DL — SIGNIFICANT CHANGE UP (ref 11.5–15.5)
HGB BLD-MCNC: 15.4 G/DL — SIGNIFICANT CHANGE UP (ref 11.5–15.5)
HGB BLD-MCNC: 15.9 G/DL — HIGH (ref 11.5–15.5)
HYALINE CASTS # UR AUTO: 3 /LPF — HIGH (ref 0–2)
IMM GRANULOCYTES NFR BLD AUTO: 0.3 % — SIGNIFICANT CHANGE UP (ref 0–1.5)
IMM GRANULOCYTES NFR BLD AUTO: 0.9 % — SIGNIFICANT CHANGE UP (ref 0–1.5)
IMM GRANULOCYTES NFR BLD AUTO: 1.1 % — SIGNIFICANT CHANGE UP (ref 0–1.5)
INR BLD: 1.03 RATIO — SIGNIFICANT CHANGE UP (ref 0.88–1.16)
INR BLD: 1.08 RATIO — SIGNIFICANT CHANGE UP (ref 0.88–1.16)
INR BLD: 1.12 RATIO — SIGNIFICANT CHANGE UP (ref 0.88–1.16)
INR BLD: 1.31 RATIO — HIGH (ref 0.88–1.16)
KETONES UR-MCNC: NEGATIVE — SIGNIFICANT CHANGE UP
KETONES UR-MCNC: NEGATIVE — SIGNIFICANT CHANGE UP
LACTATE BLDV-MCNC: 2.5 MMOL/L — HIGH (ref 0.7–2)
LACTATE BLDV-MCNC: 2.5 MMOL/L — HIGH (ref 0.7–2)
LACTATE BLDV-MCNC: 3.4 MMOL/L — HIGH (ref 0.7–2)
LACTATE SERPL-SCNC: 1.5 MMOL/L — SIGNIFICANT CHANGE UP (ref 0.7–2)
LEUKOCYTE ESTERASE UR-ACNC: ABNORMAL
LEUKOCYTE ESTERASE UR-ACNC: NEGATIVE — SIGNIFICANT CHANGE UP
LIPID PNL WITH DIRECT LDL SERPL: 153 MG/DL — HIGH
LYMPHOCYTES # BLD AUTO: 0.61 K/UL — LOW (ref 1–3.3)
LYMPHOCYTES # BLD AUTO: 0.73 K/UL — LOW (ref 1–3.3)
LYMPHOCYTES # BLD AUTO: 1.8 K/UL — SIGNIFICANT CHANGE UP (ref 1–3.3)
LYMPHOCYTES # BLD AUTO: 2.8 % — LOW (ref 13–44)
LYMPHOCYTES # BLD AUTO: 27.5 % — SIGNIFICANT CHANGE UP (ref 13–44)
LYMPHOCYTES # BLD AUTO: 3.4 % — LOW (ref 13–44)
MAGNESIUM SERPL-MCNC: 2.2 MG/DL — SIGNIFICANT CHANGE UP (ref 1.6–2.6)
MAGNESIUM SERPL-MCNC: 2.3 MG/DL — SIGNIFICANT CHANGE UP (ref 1.6–2.6)
MAGNESIUM SERPL-MCNC: 2.3 MG/DL — SIGNIFICANT CHANGE UP (ref 1.6–2.6)
MAGNESIUM SERPL-MCNC: 2.4 MG/DL — SIGNIFICANT CHANGE UP (ref 1.6–2.6)
MAGNESIUM SERPL-MCNC: 2.7 MG/DL — HIGH (ref 1.6–2.6)
MCHC RBC-ENTMCNC: 29.3 GM/DL — LOW (ref 32–36)
MCHC RBC-ENTMCNC: 30 GM/DL — LOW (ref 32–36)
MCHC RBC-ENTMCNC: 30.2 GM/DL — LOW (ref 32–36)
MCHC RBC-ENTMCNC: 30.2 PG — SIGNIFICANT CHANGE UP (ref 27–34)
MCHC RBC-ENTMCNC: 30.3 PG — SIGNIFICANT CHANGE UP (ref 27–34)
MCHC RBC-ENTMCNC: 30.4 GM/DL — LOW (ref 32–36)
MCHC RBC-ENTMCNC: 30.6 PG — SIGNIFICANT CHANGE UP (ref 27–34)
MCHC RBC-ENTMCNC: 30.8 PG — SIGNIFICANT CHANGE UP (ref 27–34)
MCHC RBC-ENTMCNC: 31.1 G/DL — LOW (ref 32–36)
MCHC RBC-ENTMCNC: 31.2 PG — SIGNIFICANT CHANGE UP (ref 27–34)
MCHC RBC-ENTMCNC: 31.4 PG — SIGNIFICANT CHANGE UP (ref 27–34)
MCHC RBC-ENTMCNC: 31.4 PG — SIGNIFICANT CHANGE UP (ref 27–34)
MCHC RBC-ENTMCNC: 32.6 GM/DL — SIGNIFICANT CHANGE UP (ref 32–36)
MCHC RBC-ENTMCNC: 33.2 GM/DL — SIGNIFICANT CHANGE UP (ref 32–36)
MCHC RBC-ENTMCNC: 33.3 GM/DL — SIGNIFICANT CHANGE UP (ref 32–36)
MCHC RBC-ENTMCNC: 33.4 GM/DL — SIGNIFICANT CHANGE UP (ref 32–36)
MCV RBC AUTO: 100.8 FL — HIGH (ref 80–100)
MCV RBC AUTO: 101.6 FL — HIGH (ref 80–100)
MCV RBC AUTO: 101.8 FL — HIGH (ref 80–100)
MCV RBC AUTO: 102.5 FL — HIGH (ref 80–100)
MCV RBC AUTO: 107 FL — HIGH (ref 80–100)
MCV RBC AUTO: 90.7 FL — SIGNIFICANT CHANGE UP (ref 80–100)
MCV RBC AUTO: 92.3 FL — SIGNIFICANT CHANGE UP (ref 80–100)
MCV RBC AUTO: 92.3 FL — SIGNIFICANT CHANGE UP (ref 80–100)
MCV RBC AUTO: 93.1 FL — SIGNIFICANT CHANGE UP (ref 80–100)
METHOD TYPE: SIGNIFICANT CHANGE UP
MONOCYTES # BLD AUTO: 0.58 K/UL — SIGNIFICANT CHANGE UP (ref 0–0.9)
MONOCYTES # BLD AUTO: 1.41 K/UL — HIGH (ref 0–0.9)
MONOCYTES # BLD AUTO: 1.46 K/UL — HIGH (ref 0–0.9)
MONOCYTES NFR BLD AUTO: 6.5 % — SIGNIFICANT CHANGE UP (ref 2–14)
MONOCYTES NFR BLD AUTO: 6.6 % — SIGNIFICANT CHANGE UP (ref 2–14)
MONOCYTES NFR BLD AUTO: 8.9 % — SIGNIFICANT CHANGE UP (ref 2–14)
MRSA PCR RESULT.: SIGNIFICANT CHANGE UP
NEUTROPHILS # BLD AUTO: 19.3 K/UL — HIGH (ref 1.8–7.4)
NEUTROPHILS # BLD AUTO: 19.76 K/UL — HIGH (ref 1.8–7.4)
NEUTROPHILS # BLD AUTO: 4.06 K/UL — SIGNIFICANT CHANGE UP (ref 1.8–7.4)
NEUTROPHILS NFR BLD AUTO: 62 % — SIGNIFICANT CHANGE UP (ref 43–77)
NEUTROPHILS NFR BLD AUTO: 89 % — HIGH (ref 43–77)
NEUTROPHILS NFR BLD AUTO: 89.3 % — HIGH (ref 43–77)
NITRITE UR-MCNC: NEGATIVE — SIGNIFICANT CHANGE UP
NITRITE UR-MCNC: POSITIVE
NON HDL CHOLESTEROL: 168 MG/DL — HIGH
NRBC # BLD: 0 /100 WBCS — SIGNIFICANT CHANGE UP (ref 0–0)
ORGANISM # SPEC MICROSCOPIC CNT: SIGNIFICANT CHANGE UP
ORGANISM # SPEC MICROSCOPIC CNT: SIGNIFICANT CHANGE UP
PCO2 BLDV: 40 MMHG — SIGNIFICANT CHANGE UP (ref 39–42)
PCO2 BLDV: 49 MMHG — HIGH (ref 39–42)
PCO2 BLDV: 52 MMHG — HIGH (ref 39–42)
PH BLDV: 7.32 — SIGNIFICANT CHANGE UP (ref 7.32–7.43)
PH BLDV: 7.35 — SIGNIFICANT CHANGE UP (ref 7.32–7.43)
PH BLDV: 7.39 — SIGNIFICANT CHANGE UP (ref 7.32–7.43)
PH UR: 6.5 — SIGNIFICANT CHANGE UP (ref 5–8)
PH UR: 7 — SIGNIFICANT CHANGE UP (ref 5–8)
PHOSPHATE SERPL-MCNC: 2.7 MG/DL — SIGNIFICANT CHANGE UP (ref 2.5–4.5)
PHOSPHATE SERPL-MCNC: 3 MG/DL — SIGNIFICANT CHANGE UP (ref 2.5–4.5)
PHOSPHATE SERPL-MCNC: 3.3 MG/DL — SIGNIFICANT CHANGE UP (ref 2.5–4.5)
PHOSPHATE SERPL-MCNC: 3.5 MG/DL — SIGNIFICANT CHANGE UP (ref 2.5–4.5)
PHOSPHATE SERPL-MCNC: 3.8 MG/DL — SIGNIFICANT CHANGE UP (ref 2.5–4.5)
PLATELET # BLD AUTO: 100 K/UL — LOW (ref 150–400)
PLATELET # BLD AUTO: 101 K/UL — LOW (ref 150–400)
PLATELET # BLD AUTO: 104 K/UL — LOW (ref 150–400)
PLATELET # BLD AUTO: 106 K/UL — LOW (ref 150–400)
PLATELET # BLD AUTO: 114 K/UL — LOW (ref 150–400)
PLATELET # BLD AUTO: 226 K/UL — SIGNIFICANT CHANGE UP (ref 150–400)
PLATELET # BLD AUTO: 237 K/UL — SIGNIFICANT CHANGE UP (ref 150–400)
PLATELET # BLD AUTO: 251 K/UL — SIGNIFICANT CHANGE UP (ref 150–400)
PLATELET # BLD AUTO: 255 K/UL — SIGNIFICANT CHANGE UP (ref 150–400)
PO2 BLDV: 36 MMHG — SIGNIFICANT CHANGE UP (ref 25–45)
PO2 BLDV: 43 MMHG — SIGNIFICANT CHANGE UP (ref 25–45)
PO2 BLDV: 67 MMHG — HIGH (ref 25–45)
POTASSIUM BLDV-SCNC: 4.4 MMOL/L — SIGNIFICANT CHANGE UP (ref 3.5–5.1)
POTASSIUM BLDV-SCNC: 4.7 MMOL/L — SIGNIFICANT CHANGE UP (ref 3.5–5.1)
POTASSIUM BLDV-SCNC: 5 MMOL/L — SIGNIFICANT CHANGE UP (ref 3.5–5.1)
POTASSIUM SERPL-MCNC: 3.4 MMOL/L — LOW (ref 3.5–5.3)
POTASSIUM SERPL-MCNC: 3.5 MMOL/L — SIGNIFICANT CHANGE UP (ref 3.5–5.3)
POTASSIUM SERPL-MCNC: 3.6 MMOL/L — SIGNIFICANT CHANGE UP (ref 3.5–5.3)
POTASSIUM SERPL-MCNC: 3.6 MMOL/L — SIGNIFICANT CHANGE UP (ref 3.5–5.3)
POTASSIUM SERPL-MCNC: 3.9 MMOL/L — SIGNIFICANT CHANGE UP (ref 3.5–5.3)
POTASSIUM SERPL-MCNC: 3.9 MMOL/L — SIGNIFICANT CHANGE UP (ref 3.5–5.3)
POTASSIUM SERPL-MCNC: 4.2 MMOL/L — SIGNIFICANT CHANGE UP (ref 3.5–5.3)
POTASSIUM SERPL-MCNC: 4.7 MMOL/L — SIGNIFICANT CHANGE UP (ref 3.5–5.3)
POTASSIUM SERPL-MCNC: 5 MMOL/L — SIGNIFICANT CHANGE UP (ref 3.5–5.3)
POTASSIUM SERPL-MCNC: 5 MMOL/L — SIGNIFICANT CHANGE UP (ref 3.5–5.3)
POTASSIUM SERPL-MCNC: 5.8 MMOL/L — HIGH (ref 3.5–5.3)
POTASSIUM SERPL-SCNC: 3.4 MMOL/L — LOW (ref 3.5–5.3)
POTASSIUM SERPL-SCNC: 3.5 MMOL/L — SIGNIFICANT CHANGE UP (ref 3.5–5.3)
POTASSIUM SERPL-SCNC: 3.6 MMOL/L — SIGNIFICANT CHANGE UP (ref 3.5–5.3)
POTASSIUM SERPL-SCNC: 3.6 MMOL/L — SIGNIFICANT CHANGE UP (ref 3.5–5.3)
POTASSIUM SERPL-SCNC: 3.9 MMOL/L — SIGNIFICANT CHANGE UP (ref 3.5–5.3)
POTASSIUM SERPL-SCNC: 3.9 MMOL/L — SIGNIFICANT CHANGE UP (ref 3.5–5.3)
POTASSIUM SERPL-SCNC: 4.2 MMOL/L — SIGNIFICANT CHANGE UP (ref 3.5–5.3)
POTASSIUM SERPL-SCNC: 4.7 MMOL/L — SIGNIFICANT CHANGE UP (ref 3.5–5.3)
POTASSIUM SERPL-SCNC: 5 MMOL/L — SIGNIFICANT CHANGE UP (ref 3.5–5.3)
POTASSIUM SERPL-SCNC: 5 MMOL/L — SIGNIFICANT CHANGE UP (ref 3.5–5.3)
POTASSIUM SERPL-SCNC: 5.8 MMOL/L — HIGH (ref 3.5–5.3)
PROCALCITONIN SERPL-MCNC: 0.06 NG/ML — SIGNIFICANT CHANGE UP (ref 0.02–0.1)
PROT SERPL-MCNC: 6 G/DL — SIGNIFICANT CHANGE UP (ref 6–8.3)
PROT SERPL-MCNC: 7.1 G/DL — SIGNIFICANT CHANGE UP (ref 6–8.3)
PROT SERPL-MCNC: 7.6 GM/DL — SIGNIFICANT CHANGE UP (ref 6–8.3)
PROT SERPL-MCNC: 7.9 GM/DL — SIGNIFICANT CHANGE UP (ref 6–8.3)
PROT SERPL-MCNC: 8.5 GM/DL — HIGH (ref 6–8.3)
PROT SERPL-MCNC: 8.5 GM/DL — HIGH (ref 6–8.3)
PROT SERPL-MCNC: 8.8 GM/DL — HIGH (ref 6–8.3)
PROT UR-MCNC: 100 — SIGNIFICANT CHANGE UP
PROT UR-MCNC: 30 MG/DL
PROTHROM AB SERPL-ACNC: 11.9 SEC — SIGNIFICANT CHANGE UP (ref 10.6–13.6)
PROTHROM AB SERPL-ACNC: 12.9 SEC — SIGNIFICANT CHANGE UP (ref 10.5–13.4)
PROTHROM AB SERPL-ACNC: 12.9 SEC — SIGNIFICANT CHANGE UP (ref 10.6–13.6)
PROTHROM AB SERPL-ACNC: 15.1 SEC — HIGH (ref 10.5–13.4)
RBC # BLD: 3.59 M/UL — LOW (ref 3.8–5.2)
RBC # BLD: 3.96 M/UL — SIGNIFICANT CHANGE UP (ref 3.8–5.2)
RBC # BLD: 4.27 M/UL — SIGNIFICANT CHANGE UP (ref 3.8–5.2)
RBC # BLD: 4.34 M/UL — SIGNIFICANT CHANGE UP (ref 3.8–5.2)
RBC # BLD: 4.62 M/UL — SIGNIFICANT CHANGE UP (ref 3.8–5.2)
RBC # BLD: 4.8 M/UL — SIGNIFICANT CHANGE UP (ref 3.8–5.2)
RBC # BLD: 4.91 M/UL — SIGNIFICANT CHANGE UP (ref 3.8–5.2)
RBC # BLD: 4.93 M/UL — SIGNIFICANT CHANGE UP (ref 3.8–5.2)
RBC # BLD: 5.19 M/UL — SIGNIFICANT CHANGE UP (ref 3.8–5.2)
RBC # FLD: 12.5 % — SIGNIFICANT CHANGE UP (ref 10.3–14.5)
RBC # FLD: 12.6 % — SIGNIFICANT CHANGE UP (ref 10.3–14.5)
RBC # FLD: 12.8 % — SIGNIFICANT CHANGE UP (ref 10.3–14.5)
RBC # FLD: 12.9 % — SIGNIFICANT CHANGE UP (ref 10.3–14.5)
RBC # FLD: 16.5 % — HIGH (ref 10.3–14.5)
RBC # FLD: 16.9 % — HIGH (ref 10.3–14.5)
RBC # FLD: 17.1 % — HIGH (ref 10.3–14.5)
RBC # FLD: 17.1 % — HIGH (ref 10.3–14.5)
RBC # FLD: 17.8 % — HIGH (ref 10.3–14.5)
RBC CASTS # UR COMP ASSIST: 3 /HPF — SIGNIFICANT CHANGE UP (ref 0–4)
RBC CASTS # UR COMP ASSIST: SIGNIFICANT CHANGE UP /HPF (ref 0–4)
RH IG SCN BLD-IMP: POSITIVE — SIGNIFICANT CHANGE UP
RH IG SCN BLD-IMP: POSITIVE — SIGNIFICANT CHANGE UP
RSV RNA NPH QL NAA+NON-PROBE: SIGNIFICANT CHANGE UP
S AUREUS DNA NOSE QL NAA+PROBE: SIGNIFICANT CHANGE UP
SAO2 % BLDV: 46.7 % — LOW (ref 67–88)
SAO2 % BLDV: 70 % — SIGNIFICANT CHANGE UP (ref 67–88)
SAO2 % BLDV: 92.2 % — HIGH (ref 67–88)
SARS-COV-2 RNA SPEC QL NAA+PROBE: SIGNIFICANT CHANGE UP
SODIUM SERPL-SCNC: 136 MMOL/L — SIGNIFICANT CHANGE UP (ref 135–145)
SODIUM SERPL-SCNC: 137 MMOL/L — SIGNIFICANT CHANGE UP (ref 135–145)
SODIUM SERPL-SCNC: 138 MMOL/L — SIGNIFICANT CHANGE UP (ref 135–145)
SODIUM SERPL-SCNC: 138 MMOL/L — SIGNIFICANT CHANGE UP (ref 135–145)
SODIUM SERPL-SCNC: 139 MMOL/L — SIGNIFICANT CHANGE UP (ref 135–145)
SODIUM SERPL-SCNC: 140 MMOL/L — SIGNIFICANT CHANGE UP (ref 135–145)
SODIUM SERPL-SCNC: 141 MMOL/L — SIGNIFICANT CHANGE UP (ref 135–145)
SODIUM SERPL-SCNC: 145 MMOL/L — SIGNIFICANT CHANGE UP (ref 135–145)
SODIUM SERPL-SCNC: 145 MMOL/L — SIGNIFICANT CHANGE UP (ref 135–145)
SODIUM SERPL-SCNC: 148 MMOL/L — HIGH (ref 135–145)
SODIUM SERPL-SCNC: 149 MMOL/L — HIGH (ref 135–145)
SP GR SPEC: 1 — LOW (ref 1.01–1.02)
SP GR SPEC: 1.05 — HIGH (ref 1.01–1.02)
SPECIMEN SOURCE: SIGNIFICANT CHANGE UP
TRIGL SERPL-MCNC: 76 MG/DL — SIGNIFICANT CHANGE UP
TROPONIN I, HIGH SENSITIVITY RESULT: 429.3 NG/L — HIGH
TROPONIN I, HIGH SENSITIVITY RESULT: 459.4 NG/L — HIGH
TROPONIN I, HIGH SENSITIVITY RESULT: 459.5 NG/L — HIGH
TROPONIN I, HIGH SENSITIVITY RESULT: 475.4 NG/L — HIGH
TROPONIN T, HIGH SENSITIVITY RESULT: 58 NG/L — HIGH (ref 0–51)
TROPONIN T, HIGH SENSITIVITY RESULT: 61 NG/L — HIGH (ref 0–51)
TSH SERPL-MCNC: 1.56 UIU/ML — SIGNIFICANT CHANGE UP (ref 0.36–3.74)
TSH SERPL-MCNC: 2.81 UIU/ML — SIGNIFICANT CHANGE UP (ref 0.36–3.74)
UROBILINOGEN FLD QL: NEGATIVE MG/DL — SIGNIFICANT CHANGE UP
UROBILINOGEN FLD QL: NEGATIVE — SIGNIFICANT CHANGE UP
VIT B12 SERPL-MCNC: 291 PG/ML — SIGNIFICANT CHANGE UP (ref 232–1245)
WBC # BLD: 10.3 K/UL — SIGNIFICANT CHANGE UP (ref 3.8–10.5)
WBC # BLD: 12.11 K/UL — HIGH (ref 3.8–10.5)
WBC # BLD: 17.08 K/UL — HIGH (ref 3.8–10.5)
WBC # BLD: 18.39 K/UL — HIGH (ref 3.8–10.5)
WBC # BLD: 19.29 K/UL — HIGH (ref 3.8–10.5)
WBC # BLD: 21.68 K/UL — HIGH (ref 3.8–10.5)
WBC # BLD: 22.12 K/UL — HIGH (ref 3.8–10.5)
WBC # BLD: 6.54 K/UL — SIGNIFICANT CHANGE UP (ref 3.8–10.5)
WBC # BLD: 9.8 K/UL — SIGNIFICANT CHANGE UP (ref 3.8–10.5)
WBC # FLD AUTO: 10.3 K/UL — SIGNIFICANT CHANGE UP (ref 3.8–10.5)
WBC # FLD AUTO: 12.11 K/UL — HIGH (ref 3.8–10.5)
WBC # FLD AUTO: 17.08 K/UL — HIGH (ref 3.8–10.5)
WBC # FLD AUTO: 18.39 K/UL — HIGH (ref 3.8–10.5)
WBC # FLD AUTO: 19.29 K/UL — HIGH (ref 3.8–10.5)
WBC # FLD AUTO: 21.68 K/UL — HIGH (ref 3.8–10.5)
WBC # FLD AUTO: 22.12 K/UL — HIGH (ref 3.8–10.5)
WBC # FLD AUTO: 6.54 K/UL — SIGNIFICANT CHANGE UP (ref 3.8–10.5)
WBC # FLD AUTO: 9.8 K/UL — SIGNIFICANT CHANGE UP (ref 3.8–10.5)
WBC UR QL: 523 /HPF — HIGH (ref 0–5)
WBC UR QL: SIGNIFICANT CHANGE UP

## 2022-01-01 PROCEDURE — 93010 ELECTROCARDIOGRAM REPORT: CPT

## 2022-01-01 PROCEDURE — 83605 ASSAY OF LACTIC ACID: CPT

## 2022-01-01 PROCEDURE — 99232 SBSQ HOSP IP/OBS MODERATE 35: CPT | Mod: GC

## 2022-01-01 PROCEDURE — 75635 CT ANGIO ABDOMINAL ARTERIES: CPT | Mod: MA

## 2022-01-01 PROCEDURE — U0003: CPT

## 2022-01-01 PROCEDURE — 87637 SARSCOV2&INF A&B&RSV AMP PRB: CPT

## 2022-01-01 PROCEDURE — 99222 1ST HOSP IP/OBS MODERATE 55: CPT | Mod: GC

## 2022-01-01 PROCEDURE — 83735 ASSAY OF MAGNESIUM: CPT

## 2022-01-01 PROCEDURE — 82947 ASSAY GLUCOSE BLOOD QUANT: CPT

## 2022-01-01 PROCEDURE — 99233 SBSQ HOSP IP/OBS HIGH 50: CPT

## 2022-01-01 PROCEDURE — 75635 CT ANGIO ABDOMINAL ARTERIES: CPT | Mod: 26,MA

## 2022-01-01 PROCEDURE — 82435 ASSAY OF BLOOD CHLORIDE: CPT

## 2022-01-01 PROCEDURE — 80048 BASIC METABOLIC PNL TOTAL CA: CPT

## 2022-01-01 PROCEDURE — 87040 BLOOD CULTURE FOR BACTERIA: CPT

## 2022-01-01 PROCEDURE — 99223 1ST HOSP IP/OBS HIGH 75: CPT

## 2022-01-01 PROCEDURE — 70498 CT ANGIOGRAPHY NECK: CPT | Mod: 26,MA

## 2022-01-01 PROCEDURE — 99232 SBSQ HOSP IP/OBS MODERATE 35: CPT | Mod: GC,25

## 2022-01-01 PROCEDURE — 70496 CT ANGIOGRAPHY HEAD: CPT | Mod: 26,MA

## 2022-01-01 PROCEDURE — 99285 EMERGENCY DEPT VISIT HI MDM: CPT

## 2022-01-01 PROCEDURE — 96374 THER/PROPH/DIAG INJ IV PUSH: CPT | Mod: XU

## 2022-01-01 PROCEDURE — 99223 1ST HOSP IP/OBS HIGH 75: CPT | Mod: GC

## 2022-01-01 PROCEDURE — 87640 STAPH A DNA AMP PROBE: CPT

## 2022-01-01 PROCEDURE — 93306 TTE W/DOPPLER COMPLETE: CPT | Mod: 26

## 2022-01-01 PROCEDURE — 85018 HEMOGLOBIN: CPT

## 2022-01-01 PROCEDURE — 87641 MR-STAPH DNA AMP PROBE: CPT

## 2022-01-01 PROCEDURE — 99232 SBSQ HOSP IP/OBS MODERATE 35: CPT

## 2022-01-01 PROCEDURE — 82803 BLOOD GASES ANY COMBINATION: CPT

## 2022-01-01 PROCEDURE — 70450 CT HEAD/BRAIN W/O DYE: CPT | Mod: 26

## 2022-01-01 PROCEDURE — 86901 BLOOD TYPING SEROLOGIC RH(D): CPT

## 2022-01-01 PROCEDURE — 99291 CRITICAL CARE FIRST HOUR: CPT

## 2022-01-01 PROCEDURE — 85025 COMPLETE CBC W/AUTO DIFF WBC: CPT

## 2022-01-01 PROCEDURE — 12345: CPT | Mod: NC,GC

## 2022-01-01 PROCEDURE — 85027 COMPLETE CBC AUTOMATED: CPT

## 2022-01-01 PROCEDURE — 85730 THROMBOPLASTIN TIME PARTIAL: CPT

## 2022-01-01 PROCEDURE — 71045 X-RAY EXAM CHEST 1 VIEW: CPT | Mod: 26

## 2022-01-01 PROCEDURE — 93880 EXTRACRANIAL BILAT STUDY: CPT | Mod: 26

## 2022-01-01 PROCEDURE — 85610 PROTHROMBIN TIME: CPT

## 2022-01-01 PROCEDURE — 82553 CREATINE MB FRACTION: CPT

## 2022-01-01 PROCEDURE — U0005: CPT

## 2022-01-01 PROCEDURE — 81001 URINALYSIS AUTO W/SCOPE: CPT

## 2022-01-01 PROCEDURE — 99497 ADVNCD CARE PLAN 30 MIN: CPT

## 2022-01-01 PROCEDURE — 86850 RBC ANTIBODY SCREEN: CPT

## 2022-01-01 PROCEDURE — 86900 BLOOD TYPING SEROLOGIC ABO: CPT

## 2022-01-01 PROCEDURE — 99497 ADVNCD CARE PLAN 30 MIN: CPT | Mod: 25

## 2022-01-01 PROCEDURE — 36415 COLL VENOUS BLD VENIPUNCTURE: CPT

## 2022-01-01 PROCEDURE — 93005 ELECTROCARDIOGRAM TRACING: CPT

## 2022-01-01 PROCEDURE — 96375 TX/PRO/DX INJ NEW DRUG ADDON: CPT

## 2022-01-01 PROCEDURE — 85014 HEMATOCRIT: CPT

## 2022-01-01 PROCEDURE — 84100 ASSAY OF PHOSPHORUS: CPT

## 2022-01-01 PROCEDURE — 82330 ASSAY OF CALCIUM: CPT

## 2022-01-01 PROCEDURE — 71045 X-RAY EXAM CHEST 1 VIEW: CPT

## 2022-01-01 PROCEDURE — 70551 MRI BRAIN STEM W/O DYE: CPT | Mod: 26

## 2022-01-01 PROCEDURE — 99233 SBSQ HOSP IP/OBS HIGH 50: CPT | Mod: GC

## 2022-01-01 PROCEDURE — 84295 ASSAY OF SERUM SODIUM: CPT

## 2022-01-01 PROCEDURE — 80162 ASSAY OF DIGOXIN TOTAL: CPT

## 2022-01-01 PROCEDURE — 92610 EVALUATE SWALLOWING FUNCTION: CPT

## 2022-01-01 PROCEDURE — 84484 ASSAY OF TROPONIN QUANT: CPT

## 2022-01-01 PROCEDURE — 87086 URINE CULTURE/COLONY COUNT: CPT

## 2022-01-01 PROCEDURE — 83880 ASSAY OF NATRIURETIC PEPTIDE: CPT

## 2022-01-01 PROCEDURE — 80053 COMPREHEN METABOLIC PANEL: CPT

## 2022-01-01 PROCEDURE — 0042T: CPT

## 2022-01-01 PROCEDURE — 87186 SC STD MICRODIL/AGAR DIL: CPT

## 2022-01-01 PROCEDURE — 84132 ASSAY OF SERUM POTASSIUM: CPT

## 2022-01-01 RX ORDER — HEPARIN SODIUM 5000 [USP'U]/ML
2000 INJECTION INTRAVENOUS; SUBCUTANEOUS EVERY 6 HOURS
Refills: 0 | Status: DISCONTINUED | OUTPATIENT
Start: 2022-01-01 | End: 2022-01-01

## 2022-01-01 RX ORDER — VANCOMYCIN HCL 1 G
750 VIAL (EA) INTRAVENOUS EVERY 12 HOURS
Refills: 0 | Status: DISCONTINUED | OUTPATIENT
Start: 2022-01-01 | End: 2022-01-01

## 2022-01-01 RX ORDER — GLUCAGON INJECTION, SOLUTION 0.5 MG/.1ML
1 INJECTION, SOLUTION SUBCUTANEOUS ONCE
Refills: 0 | Status: DISCONTINUED | OUTPATIENT
Start: 2022-01-01 | End: 2022-01-01

## 2022-01-01 RX ORDER — HEPARIN SODIUM 5000 [USP'U]/ML
INJECTION INTRAVENOUS; SUBCUTANEOUS
Qty: 25000 | Refills: 0 | Status: DISCONTINUED | OUTPATIENT
Start: 2022-01-01 | End: 2022-01-01

## 2022-01-01 RX ORDER — AMIODARONE HYDROCHLORIDE 400 MG/1
200 TABLET ORAL DAILY
Refills: 0 | Status: DISCONTINUED | OUTPATIENT
Start: 2022-01-01 | End: 2022-01-01

## 2022-01-01 RX ORDER — ESCITALOPRAM OXALATE 10 MG/1
5 TABLET, FILM COATED ORAL DAILY
Refills: 0 | Status: DISCONTINUED | OUTPATIENT
Start: 2022-01-01 | End: 2022-01-01

## 2022-01-01 RX ORDER — VANCOMYCIN HCL 1 G
750 VIAL (EA) INTRAVENOUS ONCE
Refills: 0 | Status: COMPLETED | OUTPATIENT
Start: 2022-01-01 | End: 2022-01-01

## 2022-01-01 RX ORDER — HEPARIN SODIUM 5000 [USP'U]/ML
4000 INJECTION INTRAVENOUS; SUBCUTANEOUS EVERY 6 HOURS
Refills: 0 | Status: DISCONTINUED | OUTPATIENT
Start: 2022-01-01 | End: 2022-01-01

## 2022-01-01 RX ORDER — ASPIRIN/CALCIUM CARB/MAGNESIUM 324 MG
1 TABLET ORAL
Qty: 0 | Refills: 0 | DISCHARGE
Start: 2022-01-01

## 2022-01-01 RX ORDER — HYDROMORPHONE HYDROCHLORIDE 2 MG/ML
0.5 INJECTION INTRAMUSCULAR; INTRAVENOUS; SUBCUTANEOUS
Refills: 0 | Status: DISCONTINUED | OUTPATIENT
Start: 2022-01-01 | End: 2022-01-01

## 2022-01-01 RX ORDER — DIGOXIN 250 MCG
125 TABLET ORAL DAILY
Refills: 0 | Status: DISCONTINUED | OUTPATIENT
Start: 2022-01-01 | End: 2022-01-01

## 2022-01-01 RX ORDER — AMIODARONE HYDROCHLORIDE 400 MG/1
0.5 TABLET ORAL
Qty: 900 | Refills: 0 | Status: COMPLETED | OUTPATIENT
Start: 2022-01-01 | End: 2022-01-01

## 2022-01-01 RX ORDER — SODIUM CHLORIDE 9 MG/ML
500 INJECTION, SOLUTION INTRAVENOUS
Refills: 0 | Status: DISCONTINUED | OUTPATIENT
Start: 2022-01-01 | End: 2022-01-01

## 2022-01-01 RX ORDER — OLANZAPINE 15 MG/1
10 TABLET, FILM COATED ORAL ONCE
Refills: 0 | Status: COMPLETED | OUTPATIENT
Start: 2022-01-01 | End: 2022-01-01

## 2022-01-01 RX ORDER — MIDAZOLAM HYDROCHLORIDE 1 MG/ML
2 INJECTION, SOLUTION INTRAMUSCULAR; INTRAVENOUS ONCE
Refills: 0 | Status: DISCONTINUED | OUTPATIENT
Start: 2022-01-01 | End: 2022-01-01

## 2022-01-01 RX ORDER — ATORVASTATIN CALCIUM 80 MG/1
80 TABLET, FILM COATED ORAL AT BEDTIME
Refills: 0 | Status: DISCONTINUED | OUTPATIENT
Start: 2022-01-01 | End: 2022-01-01

## 2022-01-01 RX ORDER — ROBINUL 0.2 MG/ML
0.4 INJECTION INTRAMUSCULAR; INTRAVENOUS
Refills: 0 | Status: DISCONTINUED | OUTPATIENT
Start: 2022-01-01 | End: 2022-01-01

## 2022-01-01 RX ORDER — MORPHINE SULFATE 50 MG/1
4 CAPSULE, EXTENDED RELEASE ORAL ONCE
Refills: 0 | Status: DISCONTINUED | OUTPATIENT
Start: 2022-01-01 | End: 2022-01-01

## 2022-01-01 RX ORDER — ACETAMINOPHEN 500 MG
1000 TABLET ORAL ONCE
Refills: 0 | Status: COMPLETED | OUTPATIENT
Start: 2022-01-01 | End: 2022-01-01

## 2022-01-01 RX ORDER — METOPROLOL TARTRATE 50 MG
5 TABLET ORAL EVERY 6 HOURS
Refills: 0 | Status: DISCONTINUED | OUTPATIENT
Start: 2022-01-01 | End: 2022-01-01

## 2022-01-01 RX ORDER — DEXTROSE 50 % IN WATER 50 %
25 SYRINGE (ML) INTRAVENOUS ONCE
Refills: 0 | Status: DISCONTINUED | OUTPATIENT
Start: 2022-01-01 | End: 2022-01-01

## 2022-01-01 RX ORDER — SODIUM CHLORIDE 9 MG/ML
1000 INJECTION, SOLUTION INTRAVENOUS
Refills: 0 | Status: DISCONTINUED | OUTPATIENT
Start: 2022-01-01 | End: 2022-01-01

## 2022-01-01 RX ORDER — DILTIAZEM HCL 120 MG
10 CAPSULE, EXT RELEASE 24 HR ORAL ONCE
Refills: 0 | Status: COMPLETED | OUTPATIENT
Start: 2022-01-01 | End: 2022-01-01

## 2022-01-01 RX ORDER — SODIUM CHLORIDE 9 MG/ML
500 INJECTION, SOLUTION INTRAVENOUS ONCE
Refills: 0 | Status: COMPLETED | OUTPATIENT
Start: 2022-01-01 | End: 2022-01-01

## 2022-01-01 RX ORDER — APIXABAN 2.5 MG/1
1 TABLET, FILM COATED ORAL
Qty: 0 | Refills: 0 | DISCHARGE
Start: 2022-01-01

## 2022-01-01 RX ORDER — ATORVASTATIN CALCIUM 80 MG/1
40 TABLET, FILM COATED ORAL AT BEDTIME
Refills: 0 | Status: DISCONTINUED | OUTPATIENT
Start: 2022-01-01 | End: 2022-01-01

## 2022-01-01 RX ORDER — AMIODARONE HYDROCHLORIDE 400 MG/1
1 TABLET ORAL
Qty: 900 | Refills: 0 | Status: DISCONTINUED | OUTPATIENT
Start: 2022-01-01 | End: 2022-01-01

## 2022-01-01 RX ORDER — DILTIAZEM HCL 120 MG
60 CAPSULE, EXT RELEASE 24 HR ORAL ONCE
Refills: 0 | Status: COMPLETED | OUTPATIENT
Start: 2022-01-01 | End: 2022-01-01

## 2022-01-01 RX ORDER — LANOLIN ALCOHOL/MO/W.PET/CERES
3 CREAM (GRAM) TOPICAL AT BEDTIME
Refills: 0 | Status: DISCONTINUED | OUTPATIENT
Start: 2022-01-01 | End: 2022-01-01

## 2022-01-01 RX ORDER — SODIUM CHLORIDE 9 MG/ML
1000 INJECTION INTRAMUSCULAR; INTRAVENOUS; SUBCUTANEOUS ONCE
Refills: 0 | Status: COMPLETED | OUTPATIENT
Start: 2022-01-01 | End: 2022-01-01

## 2022-01-01 RX ORDER — HYDROMORPHONE HYDROCHLORIDE 2 MG/ML
1 INJECTION INTRAMUSCULAR; INTRAVENOUS; SUBCUTANEOUS
Refills: 0 | Status: DISCONTINUED | OUTPATIENT
Start: 2022-01-01 | End: 2022-01-01

## 2022-01-01 RX ORDER — PREGABALIN 225 MG/1
1 CAPSULE ORAL
Qty: 0 | Refills: 0 | DISCHARGE
Start: 2022-01-01

## 2022-01-01 RX ORDER — METOPROLOL TARTRATE 50 MG
5 TABLET ORAL ONCE
Refills: 0 | Status: COMPLETED | OUTPATIENT
Start: 2022-01-01 | End: 2022-01-01

## 2022-01-01 RX ORDER — THIAMINE MONONITRATE (VIT B1) 100 MG
500 TABLET ORAL EVERY 8 HOURS
Refills: 0 | Status: DISCONTINUED | OUTPATIENT
Start: 2022-01-01 | End: 2022-01-01

## 2022-01-01 RX ORDER — ACETAMINOPHEN 500 MG
650 TABLET ORAL EVERY 6 HOURS
Refills: 0 | Status: DISCONTINUED | OUTPATIENT
Start: 2022-01-01 | End: 2022-01-01

## 2022-01-01 RX ORDER — HEPARIN SODIUM 5000 [USP'U]/ML
4500 INJECTION INTRAVENOUS; SUBCUTANEOUS EVERY 6 HOURS
Refills: 0 | Status: DISCONTINUED | OUTPATIENT
Start: 2022-01-01 | End: 2022-01-01

## 2022-01-01 RX ORDER — INFLUENZA VIRUS VACCINE 15; 15; 15; 15 UG/.5ML; UG/.5ML; UG/.5ML; UG/.5ML
0.7 SUSPENSION INTRAMUSCULAR ONCE
Refills: 0 | Status: DISCONTINUED | OUTPATIENT
Start: 2022-01-01 | End: 2022-01-01

## 2022-01-01 RX ORDER — SODIUM CHLORIDE 9 MG/ML
1000 INJECTION, SOLUTION INTRAVENOUS ONCE
Refills: 0 | Status: COMPLETED | OUTPATIENT
Start: 2022-01-01 | End: 2022-01-01

## 2022-01-01 RX ORDER — HYDROMORPHONE HYDROCHLORIDE 2 MG/ML
0.2 INJECTION INTRAMUSCULAR; INTRAVENOUS; SUBCUTANEOUS
Refills: 0 | Status: DISCONTINUED | OUTPATIENT
Start: 2022-01-01 | End: 2022-01-01

## 2022-01-01 RX ORDER — DEXTROSE 50 % IN WATER 50 %
12.5 SYRINGE (ML) INTRAVENOUS ONCE
Refills: 0 | Status: DISCONTINUED | OUTPATIENT
Start: 2022-01-01 | End: 2022-01-01

## 2022-01-01 RX ORDER — HEPARIN SODIUM 5000 [USP'U]/ML
4000 INJECTION INTRAVENOUS; SUBCUTANEOUS ONCE
Refills: 0 | Status: COMPLETED | OUTPATIENT
Start: 2022-01-01 | End: 2022-01-01

## 2022-01-01 RX ORDER — METOPROLOL TARTRATE 50 MG
5 TABLET ORAL ONCE
Refills: 0 | Status: DISCONTINUED | OUTPATIENT
Start: 2022-01-01 | End: 2022-01-01

## 2022-01-01 RX ORDER — FUROSEMIDE 40 MG
40 TABLET ORAL ONCE
Refills: 0 | Status: COMPLETED | OUTPATIENT
Start: 2022-01-01 | End: 2022-01-01

## 2022-01-01 RX ORDER — VANCOMYCIN HCL 1 G
750 VIAL (EA) INTRAVENOUS EVERY 24 HOURS
Refills: 0 | Status: DISCONTINUED | OUTPATIENT
Start: 2022-01-01 | End: 2022-01-01

## 2022-01-01 RX ORDER — AMLODIPINE BESYLATE 2.5 MG/1
5 TABLET ORAL DAILY
Refills: 0 | Status: DISCONTINUED | OUTPATIENT
Start: 2022-01-01 | End: 2022-01-01

## 2022-01-01 RX ORDER — DILTIAZEM HCL 120 MG
20 CAPSULE, EXT RELEASE 24 HR ORAL ONCE
Refills: 0 | Status: DISCONTINUED | OUTPATIENT
Start: 2022-01-01 | End: 2022-01-01

## 2022-01-01 RX ORDER — HYDROMORPHONE HYDROCHLORIDE 2 MG/ML
0.2 INJECTION INTRAMUSCULAR; INTRAVENOUS; SUBCUTANEOUS EVERY 6 HOURS
Refills: 0 | Status: DISCONTINUED | OUTPATIENT
Start: 2022-01-01 | End: 2022-01-01

## 2022-01-01 RX ORDER — ASCORBIC ACID 60 MG
500 TABLET,CHEWABLE ORAL DAILY
Refills: 0 | Status: DISCONTINUED | OUTPATIENT
Start: 2022-01-01 | End: 2022-01-01

## 2022-01-01 RX ORDER — MORPHINE SULFATE 50 MG/1
2 CAPSULE, EXTENDED RELEASE ORAL ONCE
Refills: 0 | Status: DISCONTINUED | OUTPATIENT
Start: 2022-01-01 | End: 2022-01-01

## 2022-01-01 RX ORDER — POLYETHYLENE GLYCOL 3350 17 G/17G
17 POWDER, FOR SOLUTION ORAL DAILY
Refills: 0 | Status: DISCONTINUED | OUTPATIENT
Start: 2022-01-01 | End: 2022-01-01

## 2022-01-01 RX ORDER — AMIODARONE HYDROCHLORIDE 400 MG/1
150 TABLET ORAL ONCE
Refills: 0 | Status: COMPLETED | OUTPATIENT
Start: 2022-01-01 | End: 2022-01-01

## 2022-01-01 RX ORDER — METOPROLOL TARTRATE 50 MG
25 TABLET ORAL
Refills: 0 | Status: DISCONTINUED | OUTPATIENT
Start: 2022-01-01 | End: 2022-01-01

## 2022-01-01 RX ORDER — VANCOMYCIN HCL 1 G
1000 VIAL (EA) INTRAVENOUS ONCE
Refills: 0 | Status: COMPLETED | OUTPATIENT
Start: 2022-01-01 | End: 2022-01-01

## 2022-01-01 RX ORDER — PREGABALIN 225 MG/1
1000 CAPSULE ORAL DAILY
Refills: 0 | Status: DISCONTINUED | OUTPATIENT
Start: 2022-01-01 | End: 2022-01-01

## 2022-01-01 RX ORDER — AMLODIPINE BESYLATE 2.5 MG/1
1 TABLET ORAL
Qty: 0 | Refills: 0 | DISCHARGE
Start: 2022-01-01

## 2022-01-01 RX ORDER — ASPIRIN/CALCIUM CARB/MAGNESIUM 324 MG
81 TABLET ORAL DAILY
Refills: 0 | Status: DISCONTINUED | OUTPATIENT
Start: 2022-01-01 | End: 2022-01-01

## 2022-01-01 RX ORDER — APIXABAN 2.5 MG/1
2.5 TABLET, FILM COATED ORAL
Refills: 0 | Status: DISCONTINUED | OUTPATIENT
Start: 2022-01-01 | End: 2022-01-01

## 2022-01-01 RX ORDER — DEXTROSE 50 % IN WATER 50 %
15 SYRINGE (ML) INTRAVENOUS ONCE
Refills: 0 | Status: DISCONTINUED | OUTPATIENT
Start: 2022-01-01 | End: 2022-01-01

## 2022-01-01 RX ORDER — WARFARIN SODIUM 2.5 MG/1
5 TABLET ORAL AT BEDTIME
Refills: 0 | Status: DISCONTINUED | OUTPATIENT
Start: 2022-01-01 | End: 2022-01-01

## 2022-01-01 RX ORDER — VANCOMYCIN HCL 1 G
VIAL (EA) INTRAVENOUS
Refills: 0 | Status: DISCONTINUED | OUTPATIENT
Start: 2022-01-01 | End: 2022-01-01

## 2022-01-01 RX ORDER — POTASSIUM CHLORIDE 20 MEQ
40 PACKET (EA) ORAL ONCE
Refills: 0 | Status: COMPLETED | OUTPATIENT
Start: 2022-01-01 | End: 2022-01-01

## 2022-01-01 RX ORDER — HYDROMORPHONE HYDROCHLORIDE 2 MG/ML
0.5 INJECTION INTRAMUSCULAR; INTRAVENOUS; SUBCUTANEOUS EVERY 6 HOURS
Refills: 0 | Status: DISCONTINUED | OUTPATIENT
Start: 2022-01-01 | End: 2022-01-01

## 2022-01-01 RX ORDER — PIPERACILLIN AND TAZOBACTAM 4; .5 G/20ML; G/20ML
3.38 INJECTION, POWDER, LYOPHILIZED, FOR SOLUTION INTRAVENOUS ONCE
Refills: 0 | Status: COMPLETED | OUTPATIENT
Start: 2022-01-01 | End: 2022-01-01

## 2022-01-01 RX ORDER — WARFARIN SODIUM 2.5 MG/1
1 TABLET ORAL
Qty: 0 | Refills: 0 | DISCHARGE

## 2022-01-01 RX ORDER — FOLIC ACID 0.8 MG
1 TABLET ORAL DAILY
Refills: 0 | Status: DISCONTINUED | OUTPATIENT
Start: 2022-01-01 | End: 2022-01-01

## 2022-01-01 RX ORDER — CEFEPIME 1 G/1
1000 INJECTION, POWDER, FOR SOLUTION INTRAMUSCULAR; INTRAVENOUS EVERY 12 HOURS
Refills: 0 | Status: DISCONTINUED | OUTPATIENT
Start: 2022-01-01 | End: 2022-01-01

## 2022-01-01 RX ADMIN — HYDROMORPHONE HYDROCHLORIDE 1 MILLIGRAM(S): 2 INJECTION INTRAMUSCULAR; INTRAVENOUS; SUBCUTANEOUS at 14:35

## 2022-01-01 RX ADMIN — HYDROMORPHONE HYDROCHLORIDE 0.2 MILLIGRAM(S): 2 INJECTION INTRAMUSCULAR; INTRAVENOUS; SUBCUTANEOUS at 05:07

## 2022-01-01 RX ADMIN — PREGABALIN 1000 MICROGRAM(S): 225 CAPSULE ORAL at 11:52

## 2022-01-01 RX ADMIN — HEPARIN SODIUM 1200 UNIT(S)/HR: 5000 INJECTION INTRAVENOUS; SUBCUTANEOUS at 04:56

## 2022-01-01 RX ADMIN — ESCITALOPRAM OXALATE 5 MILLIGRAM(S): 10 TABLET, FILM COATED ORAL at 10:23

## 2022-01-01 RX ADMIN — HYDROMORPHONE HYDROCHLORIDE 1 MILLIGRAM(S): 2 INJECTION INTRAMUSCULAR; INTRAVENOUS; SUBCUTANEOUS at 00:59

## 2022-01-01 RX ADMIN — HYDROMORPHONE HYDROCHLORIDE 0.2 MILLIGRAM(S): 2 INJECTION INTRAMUSCULAR; INTRAVENOUS; SUBCUTANEOUS at 18:52

## 2022-01-01 RX ADMIN — HYDROMORPHONE HYDROCHLORIDE 0.2 MILLIGRAM(S): 2 INJECTION INTRAMUSCULAR; INTRAVENOUS; SUBCUTANEOUS at 23:25

## 2022-01-01 RX ADMIN — Medication 10 MILLIGRAM(S): at 00:34

## 2022-01-01 RX ADMIN — SODIUM CHLORIDE 60 MILLILITER(S): 9 INJECTION, SOLUTION INTRAVENOUS at 16:54

## 2022-01-01 RX ADMIN — Medication 105 MILLIGRAM(S): at 22:26

## 2022-01-01 RX ADMIN — Medication 500 MILLIGRAM(S): at 11:52

## 2022-01-01 RX ADMIN — HYDROMORPHONE HYDROCHLORIDE 0.2 MILLIGRAM(S): 2 INJECTION INTRAMUSCULAR; INTRAVENOUS; SUBCUTANEOUS at 05:27

## 2022-01-01 RX ADMIN — HEPARIN SODIUM 1000 UNIT(S)/HR: 5000 INJECTION INTRAVENOUS; SUBCUTANEOUS at 03:26

## 2022-01-01 RX ADMIN — HYDROMORPHONE HYDROCHLORIDE 0.2 MILLIGRAM(S): 2 INJECTION INTRAMUSCULAR; INTRAVENOUS; SUBCUTANEOUS at 17:22

## 2022-01-01 RX ADMIN — APIXABAN 2.5 MILLIGRAM(S): 2.5 TABLET, FILM COATED ORAL at 09:54

## 2022-01-01 RX ADMIN — CEFEPIME 100 MILLIGRAM(S): 1 INJECTION, POWDER, FOR SOLUTION INTRAMUSCULAR; INTRAVENOUS at 05:18

## 2022-01-01 RX ADMIN — SODIUM CHLORIDE 60 MILLILITER(S): 9 INJECTION, SOLUTION INTRAVENOUS at 20:14

## 2022-01-01 RX ADMIN — Medication 125 MICROGRAM(S): at 05:22

## 2022-01-01 RX ADMIN — Medication 250 MILLIGRAM(S): at 09:37

## 2022-01-01 RX ADMIN — SODIUM CHLORIDE 100 MILLILITER(S): 9 INJECTION, SOLUTION INTRAVENOUS at 04:20

## 2022-01-01 RX ADMIN — HYDROMORPHONE HYDROCHLORIDE 0.2 MILLIGRAM(S): 2 INJECTION INTRAMUSCULAR; INTRAVENOUS; SUBCUTANEOUS at 01:00

## 2022-01-01 RX ADMIN — APIXABAN 2.5 MILLIGRAM(S): 2.5 TABLET, FILM COATED ORAL at 22:32

## 2022-01-01 RX ADMIN — HYDROMORPHONE HYDROCHLORIDE 0.5 MILLIGRAM(S): 2 INJECTION INTRAMUSCULAR; INTRAVENOUS; SUBCUTANEOUS at 17:30

## 2022-01-01 RX ADMIN — Medication 0.5 MILLIGRAM(S): at 00:59

## 2022-01-01 RX ADMIN — Medication 1 TABLET(S): at 13:51

## 2022-01-01 RX ADMIN — PREGABALIN 1000 MICROGRAM(S): 225 CAPSULE ORAL at 11:05

## 2022-01-01 RX ADMIN — Medication 500 MILLIGRAM(S): at 11:06

## 2022-01-01 RX ADMIN — Medication 1 MILLIGRAM(S): at 11:04

## 2022-01-01 RX ADMIN — APIXABAN 2.5 MILLIGRAM(S): 2.5 TABLET, FILM COATED ORAL at 21:49

## 2022-01-01 RX ADMIN — HYDROMORPHONE HYDROCHLORIDE 0.2 MILLIGRAM(S): 2 INJECTION INTRAMUSCULAR; INTRAVENOUS; SUBCUTANEOUS at 17:59

## 2022-01-01 RX ADMIN — SODIUM CHLORIDE 1000 MILLILITER(S): 9 INJECTION, SOLUTION INTRAVENOUS at 00:13

## 2022-01-01 RX ADMIN — WARFARIN SODIUM 5 MILLIGRAM(S): 2.5 TABLET ORAL at 23:03

## 2022-01-01 RX ADMIN — SODIUM CHLORIDE 1000 MILLILITER(S): 9 INJECTION INTRAMUSCULAR; INTRAVENOUS; SUBCUTANEOUS at 17:46

## 2022-01-01 RX ADMIN — Medication 0.5 MILLIGRAM(S): at 03:09

## 2022-01-01 RX ADMIN — ATORVASTATIN CALCIUM 80 MILLIGRAM(S): 80 TABLET, FILM COATED ORAL at 21:50

## 2022-01-01 RX ADMIN — PREGABALIN 1000 MICROGRAM(S): 225 CAPSULE ORAL at 13:41

## 2022-01-01 RX ADMIN — CEFEPIME 100 MILLIGRAM(S): 1 INJECTION, POWDER, FOR SOLUTION INTRAMUSCULAR; INTRAVENOUS at 06:11

## 2022-01-01 RX ADMIN — HYDROMORPHONE HYDROCHLORIDE 0.2 MILLIGRAM(S): 2 INJECTION INTRAMUSCULAR; INTRAVENOUS; SUBCUTANEOUS at 16:10

## 2022-01-01 RX ADMIN — SODIUM CHLORIDE 60 MILLILITER(S): 9 INJECTION, SOLUTION INTRAVENOUS at 11:10

## 2022-01-01 RX ADMIN — Medication 125 MICROGRAM(S): at 10:08

## 2022-01-01 RX ADMIN — APIXABAN 2.5 MILLIGRAM(S): 2.5 TABLET, FILM COATED ORAL at 11:05

## 2022-01-01 RX ADMIN — HYDROMORPHONE HYDROCHLORIDE 0.2 MILLIGRAM(S): 2 INJECTION INTRAMUSCULAR; INTRAVENOUS; SUBCUTANEOUS at 12:09

## 2022-01-01 RX ADMIN — Medication 500 MILLIGRAM(S): at 10:23

## 2022-01-01 RX ADMIN — Medication 5 MILLIGRAM(S): at 21:02

## 2022-01-01 RX ADMIN — AMIODARONE HYDROCHLORIDE 200 MILLIGRAM(S): 400 TABLET ORAL at 05:36

## 2022-01-01 RX ADMIN — HYDROMORPHONE HYDROCHLORIDE 0.5 MILLIGRAM(S): 2 INJECTION INTRAMUSCULAR; INTRAVENOUS; SUBCUTANEOUS at 23:52

## 2022-01-01 RX ADMIN — Medication 60 MILLIGRAM(S): at 03:38

## 2022-01-01 RX ADMIN — AMLODIPINE BESYLATE 5 MILLIGRAM(S): 2.5 TABLET ORAL at 06:05

## 2022-01-01 RX ADMIN — HYDROMORPHONE HYDROCHLORIDE 0.2 MILLIGRAM(S): 2 INJECTION INTRAMUSCULAR; INTRAVENOUS; SUBCUTANEOUS at 17:32

## 2022-01-01 RX ADMIN — HEPARIN SODIUM 1100 UNIT(S)/HR: 5000 INJECTION INTRAVENOUS; SUBCUTANEOUS at 19:25

## 2022-01-01 RX ADMIN — ESCITALOPRAM OXALATE 5 MILLIGRAM(S): 10 TABLET, FILM COATED ORAL at 13:51

## 2022-01-01 RX ADMIN — Medication 81 MILLIGRAM(S): at 11:06

## 2022-01-01 RX ADMIN — ESCITALOPRAM OXALATE 5 MILLIGRAM(S): 10 TABLET, FILM COATED ORAL at 13:40

## 2022-01-01 RX ADMIN — AMIODARONE HYDROCHLORIDE 200 MILLIGRAM(S): 400 TABLET ORAL at 05:22

## 2022-01-01 RX ADMIN — Medication 81 MILLIGRAM(S): at 13:59

## 2022-01-01 RX ADMIN — HYDROMORPHONE HYDROCHLORIDE 0.2 MILLIGRAM(S): 2 INJECTION INTRAMUSCULAR; INTRAVENOUS; SUBCUTANEOUS at 00:08

## 2022-01-01 RX ADMIN — HEPARIN SODIUM 1100 UNIT(S)/HR: 5000 INJECTION INTRAVENOUS; SUBCUTANEOUS at 12:59

## 2022-01-01 RX ADMIN — PREGABALIN 1000 MICROGRAM(S): 225 CAPSULE ORAL at 11:27

## 2022-01-01 RX ADMIN — Medication 1 TABLET(S): at 11:53

## 2022-01-01 RX ADMIN — HEPARIN SODIUM 2000 UNIT(S): 5000 INJECTION INTRAVENOUS; SUBCUTANEOUS at 05:00

## 2022-01-01 RX ADMIN — AMLODIPINE BESYLATE 5 MILLIGRAM(S): 2.5 TABLET ORAL at 05:35

## 2022-01-01 RX ADMIN — Medication 5 MILLIGRAM(S): at 08:04

## 2022-01-01 RX ADMIN — PIPERACILLIN AND TAZOBACTAM 200 GRAM(S): 4; .5 INJECTION, POWDER, LYOPHILIZED, FOR SOLUTION INTRAVENOUS at 19:26

## 2022-01-01 RX ADMIN — APIXABAN 2.5 MILLIGRAM(S): 2.5 TABLET, FILM COATED ORAL at 14:06

## 2022-01-01 RX ADMIN — APIXABAN 2.5 MILLIGRAM(S): 2.5 TABLET, FILM COATED ORAL at 21:35

## 2022-01-01 RX ADMIN — Medication 1 MILLIGRAM(S): at 11:06

## 2022-01-01 RX ADMIN — AMIODARONE HYDROCHLORIDE 16.7 MG/MIN: 400 TABLET ORAL at 05:45

## 2022-01-01 RX ADMIN — HYDROMORPHONE HYDROCHLORIDE 0.2 MILLIGRAM(S): 2 INJECTION INTRAMUSCULAR; INTRAVENOUS; SUBCUTANEOUS at 09:38

## 2022-01-01 RX ADMIN — AMIODARONE HYDROCHLORIDE 200 MILLIGRAM(S): 400 TABLET ORAL at 05:34

## 2022-01-01 RX ADMIN — HYDROMORPHONE HYDROCHLORIDE 0.2 MILLIGRAM(S): 2 INJECTION INTRAMUSCULAR; INTRAVENOUS; SUBCUTANEOUS at 05:00

## 2022-01-01 RX ADMIN — Medication 5 MILLIGRAM(S): at 10:37

## 2022-01-01 RX ADMIN — Medication 10 MILLIGRAM(S): at 17:26

## 2022-01-01 RX ADMIN — ATORVASTATIN CALCIUM 80 MILLIGRAM(S): 80 TABLET, FILM COATED ORAL at 22:25

## 2022-01-01 RX ADMIN — ATORVASTATIN CALCIUM 80 MILLIGRAM(S): 80 TABLET, FILM COATED ORAL at 21:58

## 2022-01-01 RX ADMIN — ESCITALOPRAM OXALATE 5 MILLIGRAM(S): 10 TABLET, FILM COATED ORAL at 11:27

## 2022-01-01 RX ADMIN — Medication 125 MICROGRAM(S): at 13:56

## 2022-01-01 RX ADMIN — HYDROMORPHONE HYDROCHLORIDE 0.5 MILLIGRAM(S): 2 INJECTION INTRAMUSCULAR; INTRAVENOUS; SUBCUTANEOUS at 22:51

## 2022-01-01 RX ADMIN — Medication 250 MILLIGRAM(S): at 21:02

## 2022-01-01 RX ADMIN — ATORVASTATIN CALCIUM 80 MILLIGRAM(S): 80 TABLET, FILM COATED ORAL at 22:32

## 2022-01-01 RX ADMIN — AMIODARONE HYDROCHLORIDE 200 MILLIGRAM(S): 400 TABLET ORAL at 09:10

## 2022-01-01 RX ADMIN — SODIUM CHLORIDE 500 MILLILITER(S): 9 INJECTION, SOLUTION INTRAVENOUS at 19:25

## 2022-01-01 RX ADMIN — ROBINUL 0.4 MILLIGRAM(S): 0.2 INJECTION INTRAMUSCULAR; INTRAVENOUS at 00:59

## 2022-01-01 RX ADMIN — ESCITALOPRAM OXALATE 5 MILLIGRAM(S): 10 TABLET, FILM COATED ORAL at 11:06

## 2022-01-01 RX ADMIN — HYDROMORPHONE HYDROCHLORIDE 0.2 MILLIGRAM(S): 2 INJECTION INTRAMUSCULAR; INTRAVENOUS; SUBCUTANEOUS at 13:25

## 2022-01-01 RX ADMIN — AMIODARONE HYDROCHLORIDE 33.3 MG/MIN: 400 TABLET ORAL at 00:11

## 2022-01-01 RX ADMIN — Medication 1 TABLET(S): at 11:27

## 2022-01-01 RX ADMIN — Medication 5 MILLIGRAM(S): at 08:28

## 2022-01-01 RX ADMIN — Medication 40 MILLIGRAM(S): at 05:36

## 2022-01-01 RX ADMIN — HYDROMORPHONE HYDROCHLORIDE 0.2 MILLIGRAM(S): 2 INJECTION INTRAMUSCULAR; INTRAVENOUS; SUBCUTANEOUS at 17:47

## 2022-01-01 RX ADMIN — Medication 81 MILLIGRAM(S): at 10:23

## 2022-01-01 RX ADMIN — HYDROMORPHONE HYDROCHLORIDE 0.2 MILLIGRAM(S): 2 INJECTION INTRAMUSCULAR; INTRAVENOUS; SUBCUTANEOUS at 00:09

## 2022-01-01 RX ADMIN — HYDROMORPHONE HYDROCHLORIDE 1 MILLIGRAM(S): 2 INJECTION INTRAMUSCULAR; INTRAVENOUS; SUBCUTANEOUS at 16:30

## 2022-01-01 RX ADMIN — HYDROMORPHONE HYDROCHLORIDE 1 MILLIGRAM(S): 2 INJECTION INTRAMUSCULAR; INTRAVENOUS; SUBCUTANEOUS at 16:45

## 2022-01-01 RX ADMIN — ATORVASTATIN CALCIUM 80 MILLIGRAM(S): 80 TABLET, FILM COATED ORAL at 21:35

## 2022-01-01 RX ADMIN — Medication 0.5 MILLIGRAM(S): at 17:31

## 2022-01-01 RX ADMIN — Medication 81 MILLIGRAM(S): at 11:27

## 2022-01-01 RX ADMIN — HYDROMORPHONE HYDROCHLORIDE 0.5 MILLIGRAM(S): 2 INJECTION INTRAMUSCULAR; INTRAVENOUS; SUBCUTANEOUS at 07:49

## 2022-01-01 RX ADMIN — PREGABALIN 1000 MICROGRAM(S): 225 CAPSULE ORAL at 11:06

## 2022-01-01 RX ADMIN — Medication 81 MILLIGRAM(S): at 11:05

## 2022-01-01 RX ADMIN — ESCITALOPRAM OXALATE 5 MILLIGRAM(S): 10 TABLET, FILM COATED ORAL at 11:05

## 2022-01-01 RX ADMIN — Medication 1 MILLIGRAM(S): at 13:57

## 2022-01-01 RX ADMIN — Medication 500 MILLIGRAM(S): at 11:05

## 2022-01-01 RX ADMIN — Medication 125 MICROGRAM(S): at 13:41

## 2022-01-01 RX ADMIN — APIXABAN 2.5 MILLIGRAM(S): 2.5 TABLET, FILM COATED ORAL at 22:25

## 2022-01-01 RX ADMIN — HEPARIN SODIUM 4000 UNIT(S): 5000 INJECTION INTRAVENOUS; SUBCUTANEOUS at 17:44

## 2022-01-01 RX ADMIN — AMLODIPINE BESYLATE 5 MILLIGRAM(S): 2.5 TABLET ORAL at 18:53

## 2022-01-01 RX ADMIN — APIXABAN 2.5 MILLIGRAM(S): 2.5 TABLET, FILM COATED ORAL at 09:10

## 2022-01-01 RX ADMIN — HYDROMORPHONE HYDROCHLORIDE 0.2 MILLIGRAM(S): 2 INJECTION INTRAMUSCULAR; INTRAVENOUS; SUBCUTANEOUS at 20:04

## 2022-01-01 RX ADMIN — HEPARIN SODIUM 1100 UNIT(S)/HR: 5000 INJECTION INTRAVENOUS; SUBCUTANEOUS at 00:35

## 2022-01-01 RX ADMIN — Medication 5 MILLIGRAM(S): at 14:46

## 2022-01-01 RX ADMIN — SODIUM CHLORIDE 60 MILLILITER(S): 9 INJECTION, SOLUTION INTRAVENOUS at 21:05

## 2022-01-01 RX ADMIN — Medication 1 TABLET(S): at 13:40

## 2022-01-01 RX ADMIN — Medication 125 MICROGRAM(S): at 06:05

## 2022-01-01 RX ADMIN — AMIODARONE HYDROCHLORIDE 200 MILLIGRAM(S): 400 TABLET ORAL at 06:05

## 2022-01-01 RX ADMIN — HYDROMORPHONE HYDROCHLORIDE 1 MILLIGRAM(S): 2 INJECTION INTRAMUSCULAR; INTRAVENOUS; SUBCUTANEOUS at 14:50

## 2022-01-01 RX ADMIN — Medication 500 MILLIGRAM(S): at 14:06

## 2022-01-01 RX ADMIN — CEFEPIME 100 MILLIGRAM(S): 1 INJECTION, POWDER, FOR SOLUTION INTRAMUSCULAR; INTRAVENOUS at 17:19

## 2022-01-01 RX ADMIN — APIXABAN 2.5 MILLIGRAM(S): 2.5 TABLET, FILM COATED ORAL at 21:57

## 2022-01-01 RX ADMIN — Medication 81 MILLIGRAM(S): at 11:52

## 2022-01-01 RX ADMIN — APIXABAN 2.5 MILLIGRAM(S): 2.5 TABLET, FILM COATED ORAL at 11:52

## 2022-01-01 RX ADMIN — Medication 1 MILLIGRAM(S): at 11:52

## 2022-01-01 RX ADMIN — HEPARIN SODIUM 1000 UNIT(S)/HR: 5000 INJECTION INTRAVENOUS; SUBCUTANEOUS at 13:17

## 2022-01-01 RX ADMIN — HYDROMORPHONE HYDROCHLORIDE 0.2 MILLIGRAM(S): 2 INJECTION INTRAMUSCULAR; INTRAVENOUS; SUBCUTANEOUS at 12:45

## 2022-01-01 RX ADMIN — Medication 125 MICROGRAM(S): at 05:14

## 2022-01-01 RX ADMIN — Medication 105 MILLIGRAM(S): at 14:02

## 2022-01-01 RX ADMIN — Medication 1 TABLET(S): at 10:23

## 2022-01-01 RX ADMIN — Medication 0.5 MILLIGRAM(S): at 20:37

## 2022-01-01 RX ADMIN — HYDROMORPHONE HYDROCHLORIDE 0.2 MILLIGRAM(S): 2 INJECTION INTRAMUSCULAR; INTRAVENOUS; SUBCUTANEOUS at 00:28

## 2022-01-01 RX ADMIN — Medication 1 MILLIGRAM(S): at 10:23

## 2022-01-01 RX ADMIN — HYDROMORPHONE HYDROCHLORIDE 0.5 MILLIGRAM(S): 2 INJECTION INTRAMUSCULAR; INTRAVENOUS; SUBCUTANEOUS at 17:26

## 2022-01-01 RX ADMIN — Medication 125 MICROGRAM(S): at 05:34

## 2022-01-01 RX ADMIN — Medication 105 MILLIGRAM(S): at 14:08

## 2022-01-01 RX ADMIN — Medication 250 MILLIGRAM(S): at 10:12

## 2022-01-01 RX ADMIN — HEPARIN SODIUM 1000 UNIT(S)/HR: 5000 INJECTION INTRAVENOUS; SUBCUTANEOUS at 19:52

## 2022-01-01 RX ADMIN — Medication 125 MICROGRAM(S): at 05:36

## 2022-01-01 RX ADMIN — PREGABALIN 1000 MICROGRAM(S): 225 CAPSULE ORAL at 10:23

## 2022-01-01 RX ADMIN — AMIODARONE HYDROCHLORIDE 16.7 MG/MIN: 400 TABLET ORAL at 16:07

## 2022-01-01 RX ADMIN — HYDROMORPHONE HYDROCHLORIDE 0.2 MILLIGRAM(S): 2 INJECTION INTRAMUSCULAR; INTRAVENOUS; SUBCUTANEOUS at 12:30

## 2022-01-01 RX ADMIN — Medication 5 MILLIGRAM(S): at 22:23

## 2022-01-01 RX ADMIN — HYDROMORPHONE HYDROCHLORIDE 0.2 MILLIGRAM(S): 2 INJECTION INTRAMUSCULAR; INTRAVENOUS; SUBCUTANEOUS at 08:02

## 2022-01-01 RX ADMIN — Medication 1 TABLET(S): at 11:06

## 2022-01-01 RX ADMIN — Medication 500 MILLIGRAM(S): at 11:27

## 2022-01-01 RX ADMIN — APIXABAN 2.5 MILLIGRAM(S): 2.5 TABLET, FILM COATED ORAL at 11:06

## 2022-01-01 RX ADMIN — ESCITALOPRAM OXALATE 5 MILLIGRAM(S): 10 TABLET, FILM COATED ORAL at 11:52

## 2022-01-01 RX ADMIN — HYDROMORPHONE HYDROCHLORIDE 0.2 MILLIGRAM(S): 2 INJECTION INTRAMUSCULAR; INTRAVENOUS; SUBCUTANEOUS at 18:00

## 2022-01-01 RX ADMIN — HYDROMORPHONE HYDROCHLORIDE 0.2 MILLIGRAM(S): 2 INJECTION INTRAMUSCULAR; INTRAVENOUS; SUBCUTANEOUS at 12:32

## 2022-01-01 RX ADMIN — HYDROMORPHONE HYDROCHLORIDE 0.2 MILLIGRAM(S): 2 INJECTION INTRAMUSCULAR; INTRAVENOUS; SUBCUTANEOUS at 11:55

## 2022-01-01 RX ADMIN — MIDAZOLAM HYDROCHLORIDE 2 MILLIGRAM(S): 1 INJECTION, SOLUTION INTRAMUSCULAR; INTRAVENOUS at 02:26

## 2022-01-01 RX ADMIN — HYDROMORPHONE HYDROCHLORIDE 0.2 MILLIGRAM(S): 2 INJECTION INTRAMUSCULAR; INTRAVENOUS; SUBCUTANEOUS at 05:52

## 2022-01-01 RX ADMIN — HYDROMORPHONE HYDROCHLORIDE 0.2 MILLIGRAM(S): 2 INJECTION INTRAMUSCULAR; INTRAVENOUS; SUBCUTANEOUS at 06:12

## 2022-01-01 RX ADMIN — HYDROMORPHONE HYDROCHLORIDE 0.2 MILLIGRAM(S): 2 INJECTION INTRAMUSCULAR; INTRAVENOUS; SUBCUTANEOUS at 08:58

## 2022-01-01 RX ADMIN — HYDROMORPHONE HYDROCHLORIDE 0.2 MILLIGRAM(S): 2 INJECTION INTRAMUSCULAR; INTRAVENOUS; SUBCUTANEOUS at 12:03

## 2022-01-01 RX ADMIN — AMIODARONE HYDROCHLORIDE 618 MILLIGRAM(S): 400 TABLET ORAL at 22:27

## 2022-01-01 RX ADMIN — Medication 1 MILLIGRAM(S): at 11:27

## 2022-01-01 RX ADMIN — Medication 105 MILLIGRAM(S): at 06:10

## 2022-01-01 RX ADMIN — Medication 1 MILLIGRAM(S): at 13:40

## 2022-01-01 RX ADMIN — MIDAZOLAM HYDROCHLORIDE 2 MILLIGRAM(S): 1 INJECTION, SOLUTION INTRAMUSCULAR; INTRAVENOUS at 03:55

## 2022-01-01 RX ADMIN — Medication 400 MILLIGRAM(S): at 19:53

## 2022-01-01 RX ADMIN — HYDROMORPHONE HYDROCHLORIDE 0.5 MILLIGRAM(S): 2 INJECTION INTRAMUSCULAR; INTRAVENOUS; SUBCUTANEOUS at 11:59

## 2022-01-01 RX ADMIN — Medication 400 MILLIGRAM(S): at 08:43

## 2022-01-01 RX ADMIN — HYDROMORPHONE HYDROCHLORIDE 0.2 MILLIGRAM(S): 2 INJECTION INTRAMUSCULAR; INTRAVENOUS; SUBCUTANEOUS at 11:39

## 2022-01-01 RX ADMIN — OLANZAPINE 10 MILLIGRAM(S): 15 TABLET, FILM COATED ORAL at 00:34

## 2022-01-01 RX ADMIN — ATORVASTATIN CALCIUM 80 MILLIGRAM(S): 80 TABLET, FILM COATED ORAL at 21:08

## 2022-01-01 RX ADMIN — Medication 1000 MILLIGRAM(S): at 22:29

## 2022-01-01 RX ADMIN — MORPHINE SULFATE 4 MILLIGRAM(S): 50 CAPSULE, EXTENDED RELEASE ORAL at 17:45

## 2022-01-01 RX ADMIN — Medication 5 MILLIGRAM(S): at 05:16

## 2022-01-01 RX ADMIN — AMLODIPINE BESYLATE 5 MILLIGRAM(S): 2.5 TABLET ORAL at 05:19

## 2022-01-01 RX ADMIN — HYDROMORPHONE HYDROCHLORIDE 0.2 MILLIGRAM(S): 2 INJECTION INTRAMUSCULAR; INTRAVENOUS; SUBCUTANEOUS at 18:02

## 2022-01-01 RX ADMIN — SODIUM CHLORIDE 60 MILLILITER(S): 9 INJECTION, SOLUTION INTRAVENOUS at 08:16

## 2022-01-01 RX ADMIN — HYDROMORPHONE HYDROCHLORIDE 0.2 MILLIGRAM(S): 2 INJECTION INTRAMUSCULAR; INTRAVENOUS; SUBCUTANEOUS at 23:57

## 2022-01-01 RX ADMIN — MORPHINE SULFATE 2 MILLIGRAM(S): 50 CAPSULE, EXTENDED RELEASE ORAL at 17:26

## 2022-01-01 RX ADMIN — Medication 1 TABLET(S): at 11:05

## 2022-01-01 RX ADMIN — HEPARIN SODIUM 1000 UNIT(S)/HR: 5000 INJECTION INTRAVENOUS; SUBCUTANEOUS at 17:45

## 2022-01-01 RX ADMIN — APIXABAN 2.5 MILLIGRAM(S): 2.5 TABLET, FILM COATED ORAL at 21:06

## 2022-01-01 RX ADMIN — AMLODIPINE BESYLATE 5 MILLIGRAM(S): 2.5 TABLET ORAL at 05:22

## 2022-01-01 RX ADMIN — HYDROMORPHONE HYDROCHLORIDE 0.5 MILLIGRAM(S): 2 INJECTION INTRAMUSCULAR; INTRAVENOUS; SUBCUTANEOUS at 22:36

## 2022-01-01 RX ADMIN — APIXABAN 2.5 MILLIGRAM(S): 2.5 TABLET, FILM COATED ORAL at 11:27

## 2022-01-01 RX ADMIN — Medication 40 MILLIEQUIVALENT(S): at 16:54

## 2022-01-12 NOTE — ED PROVIDER NOTE - PROGRESS NOTE DETAILS
CT head with no new changes. pending cta. Neurology consulted, recommend admit for MRI head, patient to be admitted. Neurology consulted Dr. Gonzalez, recommend admit for MRI head, patient to be admitted. CTA showing motion artifact, no acute neurosurgical interventions; code stroke canceled. Neurology consulted Dr. Gonzalez, recommend admit for MRI head, patient to be admitted. s/p rapid afib w/ cardizem 10mg IV x 1 and 60mg PO cardizem; HR improved from 120-130s to 90s. mild congestion on CXR likely secondary to AFIB RVR. will give lasix and admit. s/p rapid afib w/ cardizem 10mg IV x 1 and 60mg PO cardizem; HR improved from 120-130s to 90s. mild congestion on CXR likely secondary to AFIB RVR. will give lasix and admit. (+) trop likely demand ischemia from rapid afib, subtherapeutic on warfarin, likely noncompliance. currently hemodynamically stable, somnolent, have to give versed/olanzapine for agitation/dementia.

## 2022-01-12 NOTE — ED PROVIDER NOTE - CARE PLAN
Principal Discharge DX:	Confusion   1 Principal Discharge DX:	CHF exacerbation  Secondary Diagnosis:	Cerebrovascular accident (CVA)   Principal Discharge DX:	CHF exacerbation  Secondary Diagnosis:	Cerebrovascular accident (CVA)  Secondary Diagnosis:	Subtherapeutic international normalized ratio (INR)  Secondary Diagnosis:	Rapid atrial fibrillation

## 2022-01-12 NOTE — ED PROVIDER NOTE - PHYSICAL EXAMINATION
VITAL SIGNS: I have reviewed nursing notes and confirm.   GEN: Well-developed; well-nourished; in no acute distress. Speaking full sentences. (+) pleasant   SKIN: Warm, pink, no rash, no diaphoresis, no cyanosis, well perfused.   HEAD: Normocephalic; atraumatic. No scalp lacerations, no abrasions.  NECK: Supple; non tender.   EYES: Pupils 3mm equal, round, reactive to light and accomodation, conjunctiva and sclera clear. Extra-ocular movements intact bilaterally.  ENT: No nasal discharge; airway clear. Trachea is midline. ORAL: No oropharyngeal exudates or erythema. Normal dentition.  CV: Regular rate and rhythm. S1, S2 normal; no murmurs, gallops, or rubs. No lower extremity pitting edema bilaterally. Capillary refill < 2 seconds throughout. Distal pulses intact 2+ throughout.  RESP: CTA bilaterally. No wheezes, rales, or rhonchi.   ABD: Normal bowel sounds, soft, non-distended, non-tender, no rebound, no guarding, no rigidity, no hepatosplenomegaly. No CVA tenderness bilaterally.  MSK: Normal range of motion and movement of all 4 extremities. No joint or muscular pain throughout. No clubbing.   BACK: No thoracolumbar midline or paravertebral tenderness. No step-offs or obvious deformities.  NEURO: Alert & oriented x name only, Grossly unremarkable. Sensory and motor intact throughout. No focal deficits. Normal speech and coordination.

## 2022-01-12 NOTE — ED ADULT NURSE NOTE - NSIMPLEMENTINTERV_GEN_ALL_ED
Implemented All Fall with Harm Risk Interventions:  [place] to call system. Call bell, personal items and telephone within reach. Instruct patient to call for assistance. Room bathroom lighting operational. Non-slip footwear when patient is off stretcher. Physically safe environment: no spills, clutter or unnecessary equipment. Stretcher in lowest position, wheels locked, appropriate side rails in place. Provide visual cue, wrist band, yellow gown, etc. Monitor gait and stability. Monitor for mental status changes and reorient to person, place, and time. Review medications for side effects contributing to fall risk. Reinforce activity limits and safety measures with patient and family. Provide visual clues: red socks.

## 2022-01-12 NOTE — ED ADULT TRIAGE NOTE - CHIEF COMPLAINT QUOTE
right arm weakness and unsteady gait, last seen normal 1500, unknown falls, as per emt patient family is a poor historian

## 2022-01-12 NOTE — ED PROVIDER NOTE - CLINICAL SUMMARY MEDICAL DECISION MAKING FREE TEXT BOX
Code stroke called, patient w/ PMHx of dementia, AFIB, HTN, DM, HLD, prior CVA/TIA left mca infarct, anticoagulation, dementia, (warfarin/dig) p/w family reporting right arm weakness , last known well time of 3PM. per family patient is noncompliant with home meds.  NIH Stroke Scale documented.  Given history and exam, I have low suspicion for infectious etiology, neurological changes secondary to toxicologic ingestions, seizures, or complex migraine. Presentation concerning for possible CVA requiring workup.  - FSG: normal, CBC, BMP, Troponin, PT/INR/PTT, T&S, EKG, CXR, CT Head w/ CTA brain/neck/perfusion to r/o large vessel occlusion amenable to thrombectomy.  - Tele-stroke neurology consult. Patient is NOT a TPA candidate.  - Permissive HTN < 220/120 in non-TPA pts  - Admit to Telemetry.

## 2022-01-12 NOTE — ED ADULT NURSE NOTE - OBJECTIVE STATEMENT
Pt received Alert, patient is fixated on repeating the same sentence, pt answers to her name. Pt is restless in bed, blood pressure reading is high 180's systolic and 100's diastolic.

## 2022-01-12 NOTE — CONSULT NOTE ADULT - ASSESSMENT
Subjective Complaints:      Consult requested by ER doctor:                  Attending:     History of Present Illness:  Chief Complaint/Reason for Admission:  History of Present Illness:  HPI: events noted  hx of afib  on coumadin htn confused r side weakness poor memeory  poor historian ct  head  no acute path now awaek alert speech fluent incoherant    does  not follows commaNDS FACE LOOKS EQUAL EOM INTACT  ARM LEG MOVES IN  BED 3/5R SENSORY INTACT  FOR WORK UP  R.O CVA DEMENTIA AFIB ON COUMADIN FOR TSH B12 INR  FOR SUB ACUTE REHAB HX OF OLD LEFT CVA         PAST MEDICAL & SURGICAL HISTORY:  CVA (cerebral vascular accident)  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    A-fib  stable    Hypertension    No significant past surgical history    89yFemale    MEDICATIONS  (STANDING):    MEDICATIONS  (PRN):      Allergies    No Known Allergies    Intolerances      FAMILY HISTORY:  No pertinent family history in first degree relatives        REVIEW OF SYSTEMS:  General:  No wt loss, fevers, chills, night sweats  Eyes:  Good vision, no reported pain  ENT:  No sore throat, pain, runny nose, dysphagia  CV:  No pain, palpitatioins, hypo/hypertension  Resp:  No dyspnea, cough, tachypnea, wheezing  GI:  No pain, nausea, vomiting, diarrhea, constipatiion  :  No pain, bleeding, incontinence, nocturia  Muscle:  No pain, weakness  Breast:  No pain, abscess, mass, discharge  Neuro:  No weakness, tingling, memory problems  Psych:  No fatigue, insomnia, mood problems, depression  Endocrine:  No polyuria, polydypsia, cold/heat intolerance  Heme:  No petechiae, ecchymosis, easy bruisability  Skin:  No rash, tattoos, scars, edema      Vital Signs Last 24 Hrs  T(C): 36.3 (12 Jan 2022 19:58), Max: 36.3 (12 Jan 2022 19:58)  T(F): 97.4 (12 Jan 2022 19:58), Max: 97.4 (12 Jan 2022 19:58)  HR: 120 (12 Jan 2022 19:58) (120 - 120)  BP: 184/130 (12 Jan 2022 19:58) (184/130 - 184/130)  BP(mean): --  RR: 19 (12 Jan 2022 19:58) (19 - 19)  SpO2: 95% (12 Jan 2022 19:58) (95% - 95%)    GENERAL PHYSICAL EXAM:  General:  Appears stated age, well-groomed, well-nourished, no distress  HEENT:  NC/AT, patent nares w/ pink mucosa, OP clear w/o lesions, PERRL, EOMI, conjunctivae clear, no thyromegaly, nodules, adenopathy, no JVD  Chest:  Full & symmetric excursion, no increased effort, breath sounds clear  Cardiovascular:  Regular rhythm, S1, S2, no murmur/rub/S3/S4, no carotid/femoral/abdominal bruit, radial/pedal pulses 2+, no edema  Abdomen:  Soft, non-tender, non-distended, normoactive bowel sounds, no HSM  Extremities:  Gait & station:   Digits:   Nails:   Joints, Bones, Muscles:   ROM:   Stability:  Skin:  No rash/erythema/ecchymoses/petechiae/wounds/abscess/warm/dry  Musculoskeletal:  Full ROM in all joints w/o swelling/tenderness/effusion    NEUROLOGICAL EXAM:  HENT:  Normocephalic head; atraumatic head.  Neck supple.  ENT: normal looking.  Mental State:    Alert. LABS: AWAEK OPEN EYES SPEECH FLUENT INCOHERANT DOES NOT FOLLOWS COMMANDS EOM INTACT POOR MEMEORY POOR HISTORIAN  FACE EQUAL ARM LEG MOVES  IN BED 3/5 SENSORY INTACT TOES DOWN                         14.8   6.54  )-----------( 237      ( 12 Jan 2022 20:57 )             44.3           PT/INR - ( 12 Jan 2022 20:57 )   PT: 11.9 sec;   INR: 1.03 ratio         PTT - ( 12 Jan 2022 20:57 )  PTT:31.1 sec        RADIOLOGY & ADDITIONAL STUDIES:      Assessment & Opinion: EVENTS NOTED FACE R SIDE WEAKNESS OLD LEFT CVA HX AFIB ON COUMADIN  CT HEAD NO ACUTE PATH FOR MRI ERI IF NO PACE MAKER ECHO CAROTID DOPPLER  TSH B12 WATCH  INR  FOR PT SUB Acute rehab      Recommendations:  Brain MRI.  Carotid doppler.  Echocardiogram.    DVT prophylaxis as ordered. tsh b12 will follow   Medications:

## 2022-01-12 NOTE — ED PROVIDER NOTE - SECONDARY DIAGNOSIS.
Cerebrovascular accident (CVA) Subtherapeutic international normalized ratio (INR) Rapid atrial fibrillation

## 2022-01-12 NOTE — ED PROVIDER NOTE - OBJECTIVE STATEMENT
89F PMHx of CVA w/ right MCA infarct, AFIB on warfarin/diltiazem/digoxin, HTN, HLD, CAD, dementia, limited historian presenting with right arm weakness per EMS. Last known well at 3PM today. Was seen by daughter and family at 4-5PM with generalized weakness and right arm weakness. In the ED, patient unable to give a history due to dementia? however, unable to follow commands well. Moving right arm and leg well. No obvious visual deficits. Alert to name.  normal. 89F PMHx of CVA w/ old left MCA infarct, AFIB on warfarin/diltiazem/digoxin, HTN, HLD, CAD, dementia, limited historian presenting with right arm weakness per EMS. Last known well at 3PM today. Was seen by daughter and family at 4-5PM with generalized weakness and right arm weakness. In the ED, patient unable to give a history due to dementia? however, unable to follow commands well. Moving right arm and leg well. No obvious visual deficits. Alert to name.  normal.

## 2022-01-13 NOTE — H&P ADULT - ASSESSMENT
89 years old female with h/o HTN, HLD, Afib, dementia present to ED with concern for stroke. Patient unable to give history and unable to reach family listed in EMR. Per chart, last seen normal was 3PM yesterday. Patient was noted to have right arm weakness

## 2022-01-13 NOTE — H&P ADULT - NSHPPHYSICALEXAM_GEN_ALL_CORE
CONSTITUTIONAL: Well developed, well nourished, alert but confused.  EYES: PERRL, no scleral icterus  ENT: Mucosa moist, tongue normal.   NECK: Neck supple, trachea midline, non-tender  CARDIAC: Afib. No murmurs.No Pedal edema. Peripheral pulses intact  LUNGS: Clear to auscultation, equal air entry both lungs. No rales, rhonchi, wheezing. Normal respiratory effort.   ABDOMEN: Soft, nondistended, nontender. No guarding or rebound tenderness. No hepatomegaly or splenomegaly.   MUSCULOSKELETAL: Normocephalic, atraumatic. No clubbing or cyanosis.  NEUROLOGICAL: Not cooperative, seems not moving right arm and right legs  SKIN: no lesions or eruptions. Normal turgor  PSYCHIATRIC: A&O x 0, demented.

## 2022-01-13 NOTE — H&P ADULT - HISTORY OF PRESENT ILLNESS
Patient unable to provide history. Unable to reach family with number listed in EMR  89 years old female with h/o HTN, HLD, Afib, dementia present to ED with concern for stroke. Patient unable to give history and unable to reach family listed in EMR. Per chart, last seen normal was 3PM yesterday. Patient was noted to have right arm weakness.   EKG with Afib, . No leukocytosis, plt 237, INR 1, K 4.2, Cr 1.17, glucose 114, HsTnT 459, TSH 2.81, lactate 1.5, UA not suggestive of UTI, low digoxin level. CT head with no acute strole. Noted hold left MCA infarct. CT perfusion with extensive motion artifact. CTA head with Focal stenoses inferior left M2 branch. Age unknown. CTA neck with no  hemodynamically significant stenosis    SH: unable to obtain  FH: unable to obatin

## 2022-01-13 NOTE — H&P ADULT - PROBLEM SELECTOR PLAN 1
Brought in with concern for CVA, right arm weakness  Seems not moving right arm and right leg  CT head with no acute strole. Noted hold left MCA infarct. CT perfusion with extensive motion artifact. CTA head with Focal stenoses inferior left M2 branch. Age unknown. CTA neck with no  hemodynamically significant stenosis  Neurology consulted  Unable to do MRI ( unable to clarify any contraindication, family unreachable)  Will repeat CT in 24 hours  ECHO, carotid doppler, A1c, lipid panel  aspirin and high intensity statin  PT/OT/Speech luis angel  fall/aspiration precaution  Neuro check

## 2022-01-13 NOTE — H&P ADULT - PROBLEM SELECTOR PLAN 2
h/o Afib on coumadin per document  INR 1- unsure if patient is taking, family unreachable. Discussed with neurology, will change to apixaban 2.5mg bid  Monitor HR

## 2022-01-13 NOTE — H&P ADULT - PROBLEM SELECTOR PLAN 6
HsTnT 495.5, TWI in V 4-6  On aspirin, statin, apixaban  Patient does not appear to be in chest pain or SOB  ECHO  Serial trop- ordered  telemetry monitoring

## 2022-01-14 NOTE — PHYSICAL THERAPY INITIAL EVALUATION ADULT - PERTINENT HX OF CURRENT PROBLEM, REHAB EVAL
Patient sent to ED from home due to (R) sided weakness. CT brain without acute change. US doppler carotids without hemodynamic change. Raised troponin, CKMB. Discussed with Dr. Yee--no cardiac concern, 'demand ischemia as a result of rapid Afib'.

## 2022-01-14 NOTE — SWALLOW BEDSIDE ASSESSMENT ADULT - SLP GENERAL OBSERVATIONS
pt seen bedside alert and ?oriented to self. pt inconsistently responded to questions and most utterances were tangential, she did not open eyes or follow one step directions during assessment. some ?nonverbal communication, she opened her mouth for more and pt leaning to the left needing repositioning.

## 2022-01-14 NOTE — OCCUPATIONAL THERAPY INITIAL EVALUATION ADULT - GENERAL OBSERVATIONS, REHAB EVAL
Pt was seen for initial OT consult, encountered in bed on cardiac on  monitoring in NAD. Pt was observed with IV infusing in RUE. Redness noted in right shin with grade 1+ edema. Pt was AA&Ox to name, confused tangential, uncooperative & unable followed commands. Pt exhibits cognitive regression with impaired executive functions.  Pt presents with right side weakness;  this limits pt's activity tolerance ,balance, ADL management and functional mobility

## 2022-01-14 NOTE — SWALLOW BEDSIDE ASSESSMENT ADULT - PHARYNGEAL PHASE
audible swallow/Delayed pharyngeal swallow/Decreased laryngeal elevation Delayed pharyngeal swallow/Decreased laryngeal elevation

## 2022-01-14 NOTE — SWALLOW BEDSIDE ASSESSMENT ADULT - SWALLOW EVAL: DIAGNOSIS
oropharyngeal phases of swallow marked by reduced labial seal /stripping utensil, increased oral transit times with suspected delay in swallow, and decreased hyolaryngeal elevation/excursion to palpation. no overt signs of aspiration

## 2022-01-14 NOTE — OCCUPATIONAL THERAPY INITIAL EVALUATION ADULT - LEVEL OF CONSCIOUSNESS, OT EVAL
Pt is functioning at level 2 on the [place] Cognitive Continuum. Pt. initiated attention but has difficulty sustaining attention. Pt. is able to follow one-step commands but inconsistently. Pt. may functional automatically in over-learned behaviors. Pt. does not initiate activities ../alert

## 2022-01-14 NOTE — SWALLOW BEDSIDE ASSESSMENT ADULT - COMMENTS
CXR 1/12/2022IMPRESSION:Mild to moderate pulmonary venous congestion/perihilar interstitial edema, increased    CT head no contrast 1/12/2022 IMPRESSION:HEAD CT: Mild to moderate volume loss, microvascular disease, no acute hemorrhage or midline shift.

## 2022-01-14 NOTE — OCCUPATIONAL THERAPY INITIAL EVALUATION ADULT - PERTINENT HX OF CURRENT PROBLEM, REHAB EVAL
Pt is a 89 y/o who presented to ER due to acute onset of neurological deficits. Pt is diagnosed with CVA, CHF exacerbation, and rapid affib. Pt has h/o old left MCA with  encephalomalacia. Head CT 1/12/22I on results confirm mild to moderate volume loss, microvascular disease, no acute

## 2022-01-14 NOTE — PHYSICAL THERAPY INITIAL EVALUATION ADULT - ADDITIONAL COMMENTS
Per daughter-in-law Michelle contacted over phone:  Patient lives c son and daughter in law. Reports patient is bedbound. Denies presence of hospital bed. Unable to further converse over phone, as daughter was running low on cellphone battery.

## 2022-01-14 NOTE — SWALLOW BEDSIDE ASSESSMENT ADULT - H & P REVIEW
Patient unable to provide history. Unable to reach family with number listed in EMR 89 years old female with h/o HTN, HLD, Afib, dementia present to ED with concern for stroke. Patient unable to give history and unable to reach family listed in EMR. Per chart, last seen normal was 3PM yesterday. Patient was noted to have right arm weakness/yes

## 2022-01-14 NOTE — OCCUPATIONAL THERAPY INITIAL EVALUATION ADULT - PRECAUTIONS/LIMITATIONS, REHAB EVAL
Pt has elevated troponin and  history of multiple comorbidities.  Pt should be turned & positioned every 2 hours to prevent skin breakdown due to lack of mobility/cardiac precautions/fall precautions

## 2022-01-14 NOTE — PHYSICAL THERAPY INITIAL EVALUATION ADULT - DISCHARGE DISPOSITION, PT EVAL
Patient appears in baseline functions on assessment of bed mobility. Patient bedbound at baseline. No further skilled PT indicated and does not require skilled PT services post-acute care discharge.

## 2022-01-14 NOTE — PHYSICAL THERAPY INITIAL EVALUATION ADULT - RANGE OF MOTION EXAMINATION, REHAB EVAL
limited to (L) shoulder and elbow flexion due to tone; increased tone to lower limbs also limiting testing

## 2022-01-14 NOTE — OCCUPATIONAL THERAPY INITIAL EVALUATION ADULT - LIVES WITH, PROFILE
other residence at [facility]. family in the community and has a HHA to assist with ADL.

## 2022-01-15 NOTE — DIETITIAN INITIAL EVALUATION ADULT. - DIET TYPE
Magic Cup x 3/day (provides 870 kcal, 27 g protein)/pureed/supplement (specify)/mildly thick liquids

## 2022-01-15 NOTE — DIETITIAN INITIAL EVALUATION ADULT. - LITERATURE/VIDEOS GIVEN
low sodium diet for CHF and HIGH Calorie High Protein Nutrition Therapy to prevent weight loss. Unknown

## 2022-01-15 NOTE — DIETITIAN INITIAL EVALUATION ADULT. - ORAL INTAKE PTA/DIET HISTORY
PTA pt's son was living with his mom at her home. Her son was doing all of the food shopping / cooking. He reports his mom is a small eater. and she is 5'3". She has Dementia and cognitive issues. She also doesn't eat any salt because he doesn't use any salt in cooking any doesn't buy foods containing salt.  Reinforced low sodium diet for CHF and HIGH Calorie High Protein Nutrition Therapy to prevent weight loss.

## 2022-01-15 NOTE — DIETITIAN INITIAL EVALUATION ADULT. - OTHER INFO
Pt admitted w/ Acute CVA ; unspecified A-Fib ; Essential HTN ; HLD ; Bedboud ( therefore , unable to do daily weights at home) ; Elevated troponin. 1/14 - s/p swallow eval w/ current recommendations. Will provide Magic Cup x 3/day (provides 870 kcal, 27 g protein) for additional calories and protein,

## 2022-01-18 NOTE — PROGRESS NOTE ADULT - ASSESSMENT
89 years old female with h/o HTN, HLD, Afib, dementia present to ED with concern for stroke. Patient unable to give history. Was admitted because of acute CVA, overnight was started on amiodarone drip for rapid AFIB. Now HR rate is controlled on po meds.
Subjective Complaints:  Historian:             Vital Signs Last 24 Hrs  T(C): 36.6 (18 Jan 2022 16:01), Max: 36.7 (18 Jan 2022 05:50)  T(F): 97.8 (18 Jan 2022 16:01), Max: 98.1 (18 Jan 2022 05:50)  HR: 68 (18 Jan 2022 16:01) (38 - 83)  BP: 136/77 (18 Jan 2022 16:01) (135/99 - 167/89)  BP(mean): --  RR: 17 (18 Jan 2022 16:01) (16 - 18)  SpO2: 95% (18 Jan 2022 16:01) (92% - 96%)    GENERAL PHYSICAL EXAM:  General:  Appears stated age, well-groomed, well-nourished, no distress  HEENT:  NC/AT, patent nares w/ pink mucosa, OP clear w/o lesions, PERRL, EOMI, conjunctivae clear, no thyromegaly, nodules, adenopathy, no JVD  Chest:  Full & symmetric excursion, no increased effort, breath sounds clear  Cardiovascular:  Regular rhythm, S1, S2, no murmur/rub/S3/S4, no carotid/femoral/abdominal bruit, radial/pedal pulses 2+, no edema  Abdomen:  Soft, non-tender, non-distended, normoactive bowel sounds, no HSM  Extremities:  Gait & station:   Digits:   Nails:   Joints, Bones, Muscles:   ROM:   Stability:  Skin:  No rash/erythema/ecchymoses/petechiae/wounds/abscess/warm/dry  Musculoskeletal:  Full ROM in all joints w/o swelling/tenderness/effusion        LABS:    01-18    140  |  108  |  35<H>  ----------------------------<  112<H>  3.6   |  27  |  1.35<H>    Ca    9.3      18 Jan 2022 07:57            RADIOLOGY & ADDITIONAL STUDIES:        Neurology Progress Note:      Mental Status: awaek open eyes does not folows commands       Cranial Nerves: defrd      Motor:   r arm weakness left arm 3/5 legs moves in bed         Sensory: intact      Cerebellar: defrd      Gait:defrd      Assesment/Plan: mri cesar noted 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         
Subjective Complaints:  Historian:             Vital Signs Last 24 Hrs  T(C): 36.6 (2022 16:00), Max: 36.6 (2022 16:00)  T(F): 97.9 (2022 16:00), Max: 97.9 (2022 16:00)  HR: 84 (2022 16:00) (84 - 132)  BP: 143/99 (2022 16:00) (133/95 - 194/119)  BP(mean): 154 (2022 23:43) (154 - 154)  RR: 25 (2022 16:00) (17 - 26)  SpO2: 95% (2022 16:00) (93% - 99%)    GENERAL PHYSICAL EXAM:  General:  Appears stated age, well-groomed, well-nourished, no distress  HEENT:  NC/AT, patent nares w/ pink mucosa, OP clear w/o lesions, PERRL, EOMI, conjunctivae clear, no thyromegaly, nodules, adenopathy, no JVD  Chest:  Full & symmetric excursion, no increased effort, breath sounds clear  Cardiovascular:  Regular rhythm, S1, S2, no murmur/rub/S3/S4, no carotid/femoral/abdominal bruit, radial/pedal pulses 2+, no edema  Abdomen:  Soft, non-tender, non-distended, normoactive bowel sounds, no HSM  Extremities:  Gait & station:   Digits:   Nails:   Joints, Bones, Muscles:   ROM:   Stability:  Skin:  No rash/erythema/ecchymoses/petechiae/wounds/abscess/warm/dry  Musculoskeletal:  Full ROM in all joints w/o swelling/tenderness/effusion        LABS:                        14.9   9.80  )-----------( 251      ( 2022 11:59 )             44.7     01-13    138  |  100  |  24<H>  ----------------------------<  122<H>  3.6   |  27  |  1.29    Ca    9.6      2022 11:59  Phos  3.3     01-13  Mg     2.7     -13    TPro  8.5<H>  /  Alb  3.4  /  TBili  1.0  /  DBili  x   /  AST  22  /  ALT  21  /  AlkPhos  86  01-13    PT/INR - ( 2022 11:59 )   PT: 12.9 sec;   INR: 1.12 ratio         PTT - ( 2022 20:57 )  PTT:31.1 sec  Urinalysis Basic - ( 2022 23:14 )    Color: Yellow / Appearance: Clear / S.005 / pH: x  Gluc: x / Ketone: Negative  / Bili: Negative / Urobili: Negative mg/dL   Blood: x / Protein: 30 mg/dL / Nitrite: Negative   Leuk Esterase: Negative / RBC: 0-2 /HPF / WBC 0-2   Sq Epi: x / Non Sq Epi: Few / Bacteria: Few        RADIOLOGY & ADDITIONAL STUDIES:        Neurology Progress Note:      Mental Status: ;etahrgic arousable open eyes does not folows commands       Cranial Nerves: eomintact      Motor:   r arm weakness left arm 3/5 legs moves to stimuli        Sensory: intact      Cerebellar: derfd      Gait:unsteady       Assesment/Plan: left cva r arm weakness afib on eliquis 2.5 mg bid for pt rehab mri cesar  dementia         
Subjective Complaints:  Historian:             Vital Signs Last 24 Hrs  T(C): 36.8 (16 Jan 2022 11:02), Max: 37.8 (16 Jan 2022 06:43)  T(F): 98.3 (16 Jan 2022 11:02), Max: 100 (16 Jan 2022 06:43)  HR: 67 (16 Jan 2022 11:02) (67 - 98)  BP: 130/79 (16 Jan 2022 11:02) (130/79 - 159/92)  BP(mean): --  RR: 18 (16 Jan 2022 11:02) (18 - 18)  SpO2: 96% (16 Jan 2022 11:02) (96% - 97%)    GENERAL PHYSICAL EXAM:  General:  Appears stated age, well-groomed, well-nourished, no distress  HEENT:  NC/AT, patent nares w/ pink mucosa, OP clear w/o lesions, PERRL, EOMI, conjunctivae clear, no thyromegaly, nodules, adenopathy, no JVD  Chest:  Full & symmetric excursion, no increased effort, breath sounds clear  Cardiovascular:  Regular rhythm, S1, S2, no murmur/rub/S3/S4, no carotid/femoral/abdominal bruit, radial/pedal pulses 2+, no edema  Abdomen:  Soft, non-tender, non-distended, normoactive bowel sounds, no HSM  Extremities:  Gait & station:   Digits:   Nails:   Joints, Bones, Muscles:   ROM:   Stability:  Skin:  No rash/erythema/ecchymoses/petechiae/wounds/abscess/warm/dry  Musculoskeletal:  Full ROM in all joints w/o swelling/tenderness/effusion        LABS:                        14.0   10.30 )-----------( 226      ( 15 Tyson 2022 10:55 )             43.0     01-16    136  |  103  |  38<H>  ----------------------------<  103<H>  3.4<L>   |  24  |  1.38<H>    Ca    9.2      16 Jan 2022 08:42    TPro  7.6  /  Alb  2.8<L>  /  TBili  1.7<H>  /  DBili  0.4<H>  /  AST  36  /  ALT  23  /  AlkPhos  68  01-15          RADIOLOGY & ADDITIONAL STUDIES:        Neurology Progress Note:      Mental Status: awaek alert speech fluent       Cranial Nerves: eom intact      Motor:   r arm weakness 3/5 left arm 4/5 legs moves in bed 3/5         Sensory:intact      Cerebellar: defrd      Gait: unsteady       Assesment/Plan: repeat ct head no acute path afib on eliquis for pt rehab no seizure  dementia ,   ,      
Subjective Complaints:  Historian:             Vital Signs Last 24 Hrs  T(C): 36.3 (15 Tyson 2022 10:50), Max: 37.8 (14 Jan 2022 23:56)  T(F): 97.3 (15 Tyson 2022 10:50), Max: 100 (14 Jan 2022 23:56)  HR: 75 (15 Tyson 2022 10:50) (75 - 99)  BP: 103/71 (15 Tyson 2022 10:50) (103/71 - 144/97)  BP(mean): --  RR: 18 (15 Tyson 2022 10:50) (16 - 18)  SpO2: 96% (15 Tyson 2022 10:50) (95% - 97%)    GENERAL PHYSICAL EXAM:  General:  Appears stated age, well-groomed, well-nourished, no distress  HEENT:  NC/AT, patent nares w/ pink mucosa, OP clear w/o lesions, PERRL, EOMI, conjunctivae clear, no thyromegaly, nodules, adenopathy, no JVD  Chest:  Full & symmetric excursion, no increased effort, breath sounds clear  Cardiovascular:  Regular rhythm, S1, S2, no murmur/rub/S3/S4, no carotid/femoral/abdominal bruit, radial/pedal pulses 2+, no edema  Abdomen:  Soft, non-tender, non-distended, normoactive bowel sounds, no HSM  Extremities:  Gait & station:   Digits:   Nails:   Joints, Bones, Muscles:   ROM:   Stability:  Skin:  No rash/erythema/ecchymoses/petechiae/wounds/abscess/warm/dry  Musculoskeletal:  Full ROM in all joints w/o swelling/tenderness/effusion        LABS:                        14.0   10.30 )-----------( 226      ( 15 Tyson 2022 10:55 )             43.0     01-15    139  |  100  |  48<H>  ----------------------------<  112<H>  3.9   |  29  |  1.95<H>    Ca    9.1      15 Tyson 2022 10:55  Phos  3.8     01-14  Mg     2.2     01-14    TPro  7.6  /  Alb  2.8<L>  /  TBili  1.7<H>  /  DBili  0.4<H>  /  AST  36  /  ALT  23  /  AlkPhos  68  01-15          RADIOLOGY & ADDITIONAL STUDIES:        Neurology Progress Note:      Mental Status: awake alert speech fluent follws simple commands       Cranial Nerves: defrd      Motor:   r arm weakness  leg moves to stimuli         Sensory: intact      Cerebellar: defrd      Gait: defrd      Assesment/Plan: ct head repeaT  NO ACUTE PATH R ARM WEAKNESS FOR MRI ERI DEMEntia tsh b12 for pt rehab         
Subjective Complaints:  Historian:             Vital Signs Last 24 Hrs  T(C): 36.3 (17 Jan 2022 16:48), Max: 37 (17 Jan 2022 06:36)  T(F): 97.4 (17 Jan 2022 16:48), Max: 98.6 (17 Jan 2022 06:36)  HR: 72 (17 Jan 2022 16:48) (67 - 93)  BP: 127/86 (17 Jan 2022 16:48) (117/80 - 163/86)  BP(mean): --  RR: 18 (17 Jan 2022 16:48) (18 - 18)  SpO2: 92% (17 Jan 2022 16:48) (92% - 98%)    GENERAL PHYSICAL EXAM:  General:  Appears stated age, well-groomed, well-nourished, no distress  HEENT:  NC/AT, patent nares w/ pink mucosa, OP clear w/o lesions, PERRL, EOMI, conjunctivae clear, no thyromegaly, nodules, adenopathy, no JVD  Chest:  Full & symmetric excursion, no increased effort, breath sounds clear  Cardiovascular:  Regular rhythm, S1, S2, no murmur/rub/S3/S4, no carotid/femoral/abdominal bruit, radial/pedal pulses 2+, no edema  Abdomen:  Soft, non-tender, non-distended, normoactive bowel sounds, no HSM  Extremities:  Gait & station:   Digits:   Nails:   Joints, Bones, Muscles:   ROM:   Stability:  Skin:  No rash/erythema/ecchymoses/petechiae/wounds/abscess/warm/dry  Musculoskeletal:  Full ROM in all joints w/o swelling/tenderness/effusion        LABS:    01-16    136  |  103  |  38<H>  ----------------------------<  103<H>  3.4<L>   |  24  |  1.38<H>    Ca    9.2      16 Jan 2022 08:42            RADIOLOGY & ADDITIONAL STUDIES:        Neurology Progress Note:      Mental Status: letahrgic arousable open eyes does not follows commands       Cranial Nerves:defrd      Motor:   r RM WEKANESS LEFT ARM 3/5         Sensory: INTACT      Cerebellar: DEFRD      Gait: DEFRD UNSTEADY       Assesment/Plan: LETAHRGIC AROUSABLE R ARM WEKANESS AFIB ON ELIQUIS FOR MRI ERI PT REHAB         
Subjective Complaints:  Historian:             Vital Signs Last 24 Hrs  T(C): 36.6 (2022 17:20), Max: 37.6 (2022 21:17)  T(F): 97.9 (2022 17:20), Max: 99.6 (2022 21:17)  HR: 87 (2022 17:20) (82 - 118)  BP: 132/88 (2022 17:20) (132/88 - 179/107)  BP(mean): --  RR: 17 (2022 17:20) (17 - 25)  SpO2: 96% (2022 17:20) (94% - 99%)    GENERAL PHYSICAL EXAM:  General:  Appears stated age, well-groomed, well-nourished, no distress  HEENT:  NC/AT, patent nares w/ pink mucosa, OP clear w/o lesions, PERRL, EOMI, conjunctivae clear, no thyromegaly, nodules, adenopathy, no JVD  Chest:  Full & symmetric excursion, no increased effort, breath sounds clear  Cardiovascular:  Regular rhythm, S1, S2, no murmur/rub/S3/S4, no carotid/femoral/abdominal bruit, radial/pedal pulses 2+, no edema  Abdomen:  Soft, non-tender, non-distended, normoactive bowel sounds, no HSM  Extremities:  Gait & station:   Digits:   Nails:   Joints, Bones, Muscles:   ROM:   Stability:  Skin:  No rash/erythema/ecchymoses/petechiae/wounds/abscess/warm/dry  Musculoskeletal:  Full ROM in all joints w/o swelling/tenderness/effusion        LABS:                        15.9   12.11 )-----------( 255      ( 2022 08:33 )             47.9     01-14    138  |  99  |  35<H>  ----------------------------<  112<H>  3.9   |  31  |  1.70<H>    Ca    9.7      2022 08:33  Phos  3.8     01-14  Mg     2.2     -14    TPro  8.8<H>  /  Alb  3.4  /  TBili  1.8<H>  /  DBili  x   /  AST  48<H>  /  ALT  29  /  AlkPhos  89  01-14    PT/INR - ( 2022 11:59 )   PT: 12.9 sec;   INR: 1.12 ratio         PTT - ( 2022 20:57 )  PTT:31.1 sec  Urinalysis Basic - ( 2022 23:14 )    Color: Yellow / Appearance: Clear / S.005 / pH: x  Gluc: x / Ketone: Negative  / Bili: Negative / Urobili: Negative mg/dL   Blood: x / Protein: 30 mg/dL / Nitrite: Negative   Leuk Esterase: Negative / RBC: 0-2 /HPF / WBC 0-2   Sq Epi: x / Non Sq Epi: Few / Bacteria: Few        RADIOLOGY & ADDITIONAL STUDIES:        Neurology Progress Note:      Mental Status: letahrgic arousable speech mumbles does not follows commands       Cranial Nerves:defrd      Motor:   r arm weakenss left arm 3/5 legs  weaKNESS         Sensory:INTACT      Cerebellar: DEFRD      Gait: DEFRD      Assesment/Plan: AFIB ON AMIODRONE  LEFT CVA FOR CT HEAD NOT EATING IV FLUIDS  FOR MRI ERI SPEECH AND SWALLOWING EVAL ENCEPHAALOTHY NO SEIZURE         
89 years old female with h/o HTN, HLD, Afib, dementia present to ED with concern for stroke. Patient unable to give history. Was admitted because of acute CVA, overnight was started on amiodarone drip for rapid AFIB. Now HR rate is controlled and is off the drip.
89 years old female with h/o HTN, HLD, Afib, dementia present to ED with concern for stroke. Patient unable to give history. Was admitted because of acute CVA, and rapid AFIB. Now HR rate is controlled on po meds.
89 years old female with h/o HTN, HLD, Afib, dementia present to ED with concern for stroke. Patient unable to give history. Was admitted because of acute CVA, and rapid AFIB. Now HR rate is controlled on po meds. Patient does have acute CVA and not a candidate for rehab. Thus, will be discharged home tomorrow with home care, daughter in law has agreed to take her home with Home care service.
89 years old female with h/o HTN, HLD, Afib, dementia present to ED with concern for stroke. Patient unable to give history. Was admitted because of acute CVA, and rapid AFIB. Now HR rate is controlled on po meds.

## 2022-01-18 NOTE — PROGRESS NOTE ADULT - PROBLEM SELECTOR PLAN 4
high intensity statin

## 2022-01-18 NOTE — PROGRESS NOTE ADULT - PROVIDER SPECIALTY LIST ADULT
Neurology
Hospitalist

## 2022-01-18 NOTE — PROGRESS NOTE ADULT - SUBJECTIVE AND OBJECTIVE BOX
Patient is a 89y old  Female who presents with a chief complaint of CVA (18 Jan 2022 12:09)    Chart reviewed, overnight events noted, case discussed with nurse at bedside.  Patient not answering appropriately to questions. Unable to obtain review of systems.    SUBJECTIVE & OBJECTIVE: Pt seen and examined at bedside.   PHYSICAL EXAM:  Vital Signs Last 24 Hrs  T(C): 36.3 (18 Jan 2022 11:01), Max: 36.7 (18 Jan 2022 05:50)  T(F): 97.3 (18 Jan 2022 11:01), Max: 98.1 (18 Jan 2022 05:50)  HR: 70 (18 Jan 2022 11:01) (68 - 83)  BP: 143/92 (18 Jan 2022 11:01) (122/82 - 167/89)  BP(mean): --  RR: 18 (18 Jan 2022 11:01) (16 - 18)  SpO2: 92% (18 Jan 2022 11:01) (92% - 96%)   Daily     Daily I&O's Detail    17 Jan 2022 07:01  -  18 Jan 2022 07:00  --------------------------------------------------------  IN:    Oral Fluid: 80 mL  Total IN: 80 mL    OUT:    Voided (mL): 1 mL  Total OUT: 1 mL  Total NET: 79 mL    Patient is awake and alert but aphasic,   patient is calm and comfortable.  Looks frail.  GENERAL: NAD, well-groomed,  HEAD:  Atraumatic, Normocephalic  EYES:  conjunctiva and sclera clear  ENMT: Moist mucous membranes  NECK: Supple, No JVD  NERVOUS SYSTEM:  Alert but not Oriented, right sided deficit  CHEST/LUNG: poor air movement bilaterally; No rales, rhonchi, wheezing, or rubs  HEART: Irregular rhythm; No murmurs, rubs, or gallops  ABDOMEN: Soft, Nontender, Nondistended; Bowel sounds present  EXTREMITIES:  2+ Peripheral Pulses, No clubbing, cyanosis, or edema  LABS:  CAPILLARY BLOOD GLUCOSE  POCT Blood Glucose.: 82 mg/dL (18 Jan 2022 11:24)  POCT Blood Glucose.: 111 mg/dL (18 Jan 2022 08:15)  POCT Blood Glucose.: 110 mg/dL (18 Jan 2022 06:11)  POCT Blood Glucose.: 110 mg/dL (18 Jan 2022 00:11)  POCT Blood Glucose.: 120 mg/dL (17 Jan 2022 16:58)    RADIOLOGY & ADDITIONAL TESTS:  < from: MR Head No Cont (01.18.22 @ 10:37) >  IMPRESSION:  -Acute infarcts of the left pre and postcentral gyri.  -Chronic infarcts as described above.  -Extensive chronic small vessel disease.    MEDICATIONS  (STANDING):  aMIOdarone    Tablet 200 milliGRAM(s) Oral daily  amLODIPine   Tablet 5 milliGRAM(s) Oral daily  apixaban 2.5 milliGRAM(s) Oral <User Schedule>  ascorbic acid 500 milliGRAM(s) Oral daily  aspirin  chewable 81 milliGRAM(s) Enteral Tube daily  atorvastatin 80 milliGRAM(s) Oral at bedtime  cyanocobalamin 1000 MICROGram(s) Oral daily  dextrose 40% Gel 15 Gram(s) Oral once  dextrose 50% Injectable 25 Gram(s) IV Push once  dextrose 50% Injectable 12.5 Gram(s) IV Push once  dextrose 50% Injectable 25 Gram(s) IV Push once  digoxin     Tablet 125 MICROGram(s) Oral daily  escitalopram 5 milliGRAM(s) Oral daily  folic acid 1 milliGRAM(s) Oral daily  glucagon  Injectable 1 milliGRAM(s) IntraMuscular once  influenza  Vaccine (HIGH DOSE) 0.7 milliLiter(s) IntraMuscular once  multivitamin 1 Tablet(s) Oral daily    MEDICATIONS  (PRN):  melatonin 3 milliGRAM(s) Oral at bedtime PRN Insomnia  polyethylene glycol 3350 17 Gram(s) Oral daily PRN Constipation      
Patient is a 89y old  Female who presents with a chief complaint of CVA (16 Jan 2022 16:32)    Chart reviewed, overnight events noted, case discussed with nurse at bedside.  Patient not answering to questions appropriately. Denies chest pain, sob, palpitations or chills.    SUBJECTIVE & OBJECTIVE: Pt seen and examined at bedside.   PHYSICAL EXAM:  Vital Signs Last 24 Hrs  T(C): 36.3 (17 Jan 2022 16:48), Max: 37 (17 Jan 2022 06:36)  T(F): 97.4 (17 Jan 2022 16:48), Max: 98.6 (17 Jan 2022 06:36)  HR: 72 (17 Jan 2022 16:48) (67 - 93)  BP: 127/86 (17 Jan 2022 16:48) (117/80 - 163/86)  BP(mean): --  RR: 18 (17 Jan 2022 16:48) (18 - 18)  SpO2: 92% (17 Jan 2022 16:48) (92% - 98%)   Daily     Daily I&O's Detail    16 Jan 2022 07:01  -  17 Jan 2022 07:00  --------------------------------------------------------  IN:    Oral Fluid: 118 mL  Total IN: 118 mL  OUT:  Total OUT: 0 mL  Total NET: 118 mL      17 Jan 2022 07:01  -  17 Jan 2022 18:04  --------------------------------------------------------  IN:    Oral Fluid: 80 mL  Total IN: 80 mL    OUT:    Voided (mL): 1 mL  Total OUT: 1 mL    Total NET: 79 mL    Patient is confused but calm, looks frail.    GENERAL: NAD, well-groomed  HEAD:  Atraumatic, Normocephalic  EYES: EOMI, conjunctiva and sclera clear  ENMT: Moist mucous membranes  NECK: Supple, No JVD  NERVOUS SYSTEM:  Alert, right arm and leg motor 2/5   CHEST/LUNG: Clear to auscultation bilaterally; No rales, rhonchi, wheezing, or rubs  HEART: Irregular rhythm; No murmurs, rubs, or gallops  ABDOMEN: Soft, Nontender, Nondistended; Bowel sounds present  EXTREMITIES:  2+ Peripheral Pulses, No clubbing, cyanosis, or edema    LABS:  01-16    136  |  103  |  38<H>  ----------------------------<  103<H>  3.4<L>   |  24  |  1.38<H>    Ca    9.2      16 Jan 2022 08:42      CAPILLARY BLOOD GLUCOSE  POCT Blood Glucose.: 120 mg/dL (17 Jan 2022 16:58)  POCT Blood Glucose.: 121 mg/dL (17 Jan 2022 11:08)  POCT Blood Glucose.: 109 mg/dL (17 Jan 2022 06:44)  POCT Blood Glucose.: 150 mg/dL (16 Jan 2022 22:48)    MEDICATIONS  (STANDING):  aMIOdarone    Tablet 200 milliGRAM(s) Oral daily  amLODIPine   Tablet 5 milliGRAM(s) Oral daily  apixaban 2.5 milliGRAM(s) Oral <User Schedule>  ascorbic acid 500 milliGRAM(s) Oral daily  aspirin  chewable 81 milliGRAM(s) Enteral Tube daily  atorvastatin 80 milliGRAM(s) Oral at bedtime  cyanocobalamin 1000 MICROGram(s) Oral daily  dextrose 40% Gel 15 Gram(s) Oral once  dextrose 5% + sodium chloride 0.45%. 1000 milliLiter(s) (60 mL/Hr) IV Continuous <Continuous>  dextrose 50% Injectable 25 Gram(s) IV Push once  dextrose 50% Injectable 12.5 Gram(s) IV Push once  dextrose 50% Injectable 25 Gram(s) IV Push once  digoxin     Tablet 125 MICROGram(s) Oral daily  escitalopram 5 milliGRAM(s) Oral daily  folic acid 1 milliGRAM(s) Oral daily  glucagon  Injectable 1 milliGRAM(s) IntraMuscular once  influenza  Vaccine (HIGH DOSE) 0.7 milliLiter(s) IntraMuscular once  multivitamin 1 Tablet(s) Oral daily    MEDICATIONS  (PRN):  melatonin 3 milliGRAM(s) Oral at bedtime PRN Insomnia  polyethylene glycol 3350 17 Gram(s) Oral daily PRN Constipation    
Patient is a 89y old  Female who presents with a chief complaint of CVA (15 Tyson 2022 18:15)    Patient is in bed, not answering to questions. Unable to review systems.    SUBJECTIVE & OBJECTIVE: Pt seen and examined at bedside.   PHYSICAL EXAM:  Vital Signs Last 24 Hrs  T(C): 36.6 (15 Tyson 2022 16:23), Max: 37.8 (14 Jan 2022 23:56)  T(F): 97.9 (15 Tyson 2022 16:23), Max: 100 (14 Jan 2022 23:56)  HR: 66 (15 Tyson 2022 16:23) (66 - 99)  BP: 149/84 (15 Tyson 2022 16:23) (103/71 - 149/84)  BP(mean): --  RR: 17 (15 Tyson 2022 16:23) (16 - 18)  SpO2: 97% (15 Tyson 2022 16:23) (95% - 97%)   Daily Height in cm: 160.02 (15 Tyson 2022 13:58)    Daily I&O's Detail    14 Jan 2022 07:01  -  15 Tyson 2022 07:00  --------------------------------------------------------  IN:    dextrose 5% + sodium chloride 0.45%: 700 mL  Total IN: 700 mL    OUT:    Oral Fluid: 0 mL    Voided (mL): 750 mL  Total OUT: 750 mL  Total NET: -50 mL    Patient is awake and alert but not answering to questions.  Patient is calm, comfortable.  GENERAL: NAD, well-groomed, well-developed  HEAD:  Atraumatic, Normocephalic  EYES: EOMI, PERRLA, conjunctiva and sclera clear  ENMT: Moist mucous membranes  NECK: Supple, No JVD  NERVOUS SYSTEM:  right arm 0/5 motor  CHEST/LUNG: Clear to auscultation bilaterally; No rales, rhonchi, wheezing, or rubs  HEART: Regular rate and rhythm; No murmurs, rubs, or gallops  ABDOMEN: Soft, Nontender, Nondistended; Bowel sounds present  EXTREMITIES:  2+ Peripheral Pulses, No clubbing, cyanosis, or edema  LABS:                        14.0   10.30 )-----------( 226      ( 15 Tyson 2022 10:55 )             43.0  01-15    139  |  100  |  48<H>  ----------------------------<  112<H>  3.9   |  29  |  1.95<H>    Ca    9.1      15 Tyson 2022 10:55  Phos  3.8     01-14  Mg     2.2     01-14    TPro  7.6  /  Alb  2.8<L>  /  TBili  1.7<H>  /  DBili  0.4<H>  /  AST  36  /  ALT  23  /  AlkPhos  68  01-15     CAPILLARY BLOOD GLUCOSE  POCT Blood Glucose.: 121 mg/dL (15 Tyson 2022 16:17)  POCT Blood Glucose.: 120 mg/dL (15 Tyson 2022 11:10)  POCT Blood Glucose.: 118 mg/dL (15 Tyson 2022 07:26)  POCT Blood Glucose.: 117 mg/dL (15 Tyson 2022 04:36)    Culture - Urine (collected 13 Jan 2022 09:12)  Source: Clean Catch Clean Catch (Midstream)  Final Report (14 Jan 2022 07:54):    No growth    MEDICATIONS  (STANDING):  aMIOdarone    Tablet 200 milliGRAM(s) Oral daily  apixaban 2.5 milliGRAM(s) Oral <User Schedule>  ascorbic acid 500 milliGRAM(s) Oral daily  aspirin  chewable 81 milliGRAM(s) Enteral Tube daily  atorvastatin 80 milliGRAM(s) Oral at bedtime  cyanocobalamin 1000 MICROGram(s) Oral daily  dextrose 40% Gel 15 Gram(s) Oral once  dextrose 5% + sodium chloride 0.45%. 1000 milliLiter(s) (60 mL/Hr) IV Continuous <Continuous>  dextrose 50% Injectable 25 Gram(s) IV Push once  dextrose 50% Injectable 12.5 Gram(s) IV Push once  dextrose 50% Injectable 25 Gram(s) IV Push once  digoxin     Tablet 125 MICROGram(s) Oral daily  escitalopram 5 milliGRAM(s) Oral daily  folic acid 1 milliGRAM(s) Oral daily  glucagon  Injectable 1 milliGRAM(s) IntraMuscular once  influenza  Vaccine (HIGH DOSE) 0.7 milliLiter(s) IntraMuscular once  multivitamin 1 Tablet(s) Oral daily    MEDICATIONS  (PRN):  melatonin 3 milliGRAM(s) Oral at bedtime PRN Insomnia  polyethylene glycol 3350 17 Gram(s) Oral daily PRN Constipation    
Patient is a 89y old  Female who presents with a chief complaint of CVA (2022 19:41)    Patient unable to provide any history, does not answer to questions.  Spoke to both son Kieran [phone] and daughter in law Michelle [phone] who stated that patient speaks both English and Polish but has not spoken for years.  She had a stroke 3 years ago and was in rehab post discharge but has been home for years now.  Patient is bed bound, does eat, but has refused to take any medications for years, she spits them out and at she screams. Was sent in because of right sided weakness.  SUBJECTIVE & OBJECTIVE: Pt seen and examined at bedside.   PHYSICAL EXAM:  Vital Signs Last 24 Hrs  T(C): 37.1 (2022 12:07), Max: 37.6 (2022 21:17)  T(F): 98.7 (2022 12:07), Max: 99.6 (2022 21:17)  HR: 82 (2022 12:07) (82 - 118)  BP: 166/79 (2022 12:07) (150/96 - 179/107)  BP(mean): --  RR: 18 (2022 12:07) (18 - 25)  SpO2: 99% (2022 12:07) (94% - 99%)   Daily Height in cm: 170.2 (2022 01:16)    Daily I&O's Detail  Patient is awake but not answering to questions.  GENERAL: NAD, well-groomed,   HEAD:  Atraumatic, Normocephalic  EYES: , conjunctiva and sclera clear  ENMT: Moist mucous membranes  NECK: Supple, No JVD  NERVOUS SYSTEM:  right sided weakness  CHEST/LUNG: Clear to auscultation bilaterally; No rales, rhonchi, wheezing, or rubs  HEART: Irregular; No murmurs, rubs, or gallops  ABDOMEN: Soft, Nontender, Nondistended; Bowel sounds present  EXTREMITIES:  2+ Peripheral Pulses, No clubbing, cyanosis, or edema  LABS:                        15.9   12.11 )-----------( 255      ( 2022 08:33 )             47.9  01-14    138  |  99  |  35<H>  ----------------------------<  112<H>  3.9   |  31  |  1.70<H>    Ca    9.7      2022 08:33  Phos  3.8       Mg     2.2         TPro  8.8<H>  /  Alb  3.4  /  TBili  1.8<H>  /  DBili  x   /  AST  48<H>  /  ALT  29  /  AlkPhos  89       PT/INR - ( 2022 11:59 )   PT: 12.9 sec;   INR: 1.12 ratio    PTT - ( 2022 20:57 )  PTT:31.1 secUrinalysis Basic - ( 2022 23:14 )    Color: Yellow / Appearance: Clear / S.005 / pH: x  Gluc: x / Ketone: Negative  / Bili: Negative / Urobili: Negative mg/dL   Blood: x / Protein: 30 mg/dL / Nitrite: Negative   Leuk Esterase: Negative / RBC: 0-2 /HPF / WBC 0-2   Sq Epi: x / Non Sq Epi: Few / Bacteria: Few    CAPILLARY BLOOD GLUCOSE    POCT Blood Glucose.: 131 mg/dL (2022 19:40)    Culture - Urine (collected 2022 09:12)  Source: Clean Catch Clean Catch (Midstream)  Final Report (2022 07:54):    No growth  RADIOLOGY & ADDITIONAL TESTS:  < from: CT Angio Neck w/ IV Cont (22 @ 20:24) >  CTA COW:  Focal stenoses inferior left M2 branch. Age unknown.    CTA NECK: Patent, ECAs, ICAs, no  hemodynamically significant stenosis at    ICA origins by NASCETcriteria.  Bilateral vertebral arteries are patent without flow limiting stenosis.    MEDICATIONS  (STANDING):  aMIOdarone Infusion 1 mG/Min (33.3 mL/Hr) IV Continuous <Continuous>  apixaban 2.5 milliGRAM(s) Oral <User Schedule>  ascorbic acid 500 milliGRAM(s) Oral daily  aspirin  chewable 81 milliGRAM(s) Enteral Tube daily  atorvastatin 80 milliGRAM(s) Oral at bedtime  cyanocobalamin 1000 MICROGram(s) Oral daily  dextrose 40% Gel 15 Gram(s) Oral once  dextrose 50% Injectable 25 Gram(s) IV Push once  dextrose 50% Injectable 12.5 Gram(s) IV Push once  dextrose 50% Injectable 25 Gram(s) IV Push once  digoxin     Tablet 125 MICROGram(s) Oral daily  escitalopram 5 milliGRAM(s) Oral daily  folic acid 1 milliGRAM(s) Oral daily  glucagon  Injectable 1 milliGRAM(s) IntraMuscular once  multivitamin 1 Tablet(s) Oral daily    MEDICATIONS  (PRN):  melatonin 3 milliGRAM(s) Oral at bedtime PRN Insomnia  polyethylene glycol 3350 17 Gram(s) Oral daily PRN Constipation    
Patient is a 89y old  Female who presents with a chief complaint of CVA (15 Tyson 2022 18:23)    Chart reviewed, overnight events noted, case discussed with nurse.  Patient denies any chest pain, sob, palpitations, abdominal pain or headache.  Patient states "I am fine".  SUBJECTIVE & OBJECTIVE: Pt seen and examined at bedside.   PHYSICAL EXAM:  Vital Signs Last 24 Hrs  T(C): 36.8 (2022 11:02), Max: 37.8 (2022 06:43)  T(F): 98.3 (2022 11:02), Max: 100 (2022 06:43)  HR: 67 (2022 11:02) (66 - 98)  BP: 130/79 (2022 11:02) (130/79 - 159/92)  BP(mean): --  RR: 18 (2022 11:02) (17 - 18)  SpO2: 96% (2022 11:02) (96% - 97%)   Daily     Daily Weight in k.5 (2022 06:43)I&O's Detail    15 Tyson 2022 07:01  -  2022 07:00  --------------------------------------------------------  IN:    Oral Fluid: 240 mL  Total IN: 240 mL    OUT:  Total OUT: 0 mL    Total NET: 240 mL    Patient is awake and alert, calm and comfortable.  Tolerating po well.    GENERAL: NAD, well-groomed, well-developed  HEAD:  Atraumatic, Normocephalic  EYES: EOMI, PERRLA, conjunctiva and sclera clear  ENMT: Moist mucous membranes  NECK: Supple, No JVD  NERVOUS SYSTEM:  Alert, Motor Strength left sided UE 2/5, right side 5/5 B/L upper and lower extremities; DTRs 2+ intact and symmetric  CHEST/LUNG: Clear to auscultation bilaterally; No rales, rhonchi, wheezing, or rubs  HEART: Irregular rhythm; No murmurs, rubs, or gallops  ABDOMEN: Soft, Nontender, Nondistended; Bowel sounds present  EXTREMITIES:  2+ Peripheral Pulses, No clubbing, cyanosis, or edema  LABS:                        14.0   10.30 )-----------( 226      ( 15 Tyson 2022 10:55 )             43.0  -    136  |  103  |  38<H>  ----------------------------<  103<H>  3.4<L>   |  24  |  1.38<H>    Ca    9.2      2022 08:42    TPro  7.6  /  Alb  2.8<L>  /  TBili  1.7<H>  /  DBili  0.4<H>  /  AST  36  /  ALT  23  /  AlkPhos  68  01-15     CAPILLARY BLOOD GLUCOSE  POCT Blood Glucose.: 192 mg/dL (2022 11:03)  POCT Blood Glucose.: 94 mg/dL (2022 07:15)  POCT Blood Glucose.: 101 mg/dL (2022 06:45)  POCT Blood Glucose.: 121 mg/dL (15 Tyson 2022 16:17)    MEDICATIONS  (STANDING):  aMIOdarone    Tablet 200 milliGRAM(s) Oral daily  amLODIPine   Tablet 5 milliGRAM(s) Oral daily  apixaban 2.5 milliGRAM(s) Oral <User Schedule>  ascorbic acid 500 milliGRAM(s) Oral daily  aspirin  chewable 81 milliGRAM(s) Enteral Tube daily  atorvastatin 80 milliGRAM(s) Oral at bedtime  cyanocobalamin 1000 MICROGram(s) Oral daily  dextrose 40% Gel 15 Gram(s) Oral once  dextrose 5% + sodium chloride 0.45%. 1000 milliLiter(s) (60 mL/Hr) IV Continuous <Continuous>  dextrose 50% Injectable 25 Gram(s) IV Push once  dextrose 50% Injectable 12.5 Gram(s) IV Push once  dextrose 50% Injectable 25 Gram(s) IV Push once  digoxin     Tablet 125 MICROGram(s) Oral daily  escitalopram 5 milliGRAM(s) Oral daily  folic acid 1 milliGRAM(s) Oral daily  glucagon  Injectable 1 milliGRAM(s) IntraMuscular once  influenza  Vaccine (HIGH DOSE) 0.7 milliLiter(s) IntraMuscular once  multivitamin 1 Tablet(s) Oral daily    MEDICATIONS  (PRN):  melatonin 3 milliGRAM(s) Oral at bedtime PRN Insomnia  polyethylene glycol 3350 17 Gram(s) Oral daily PRN Constipation

## 2022-01-18 NOTE — PROGRESS NOTE ADULT - PROBLEM SELECTOR PROBLEM 4
Hyperlipidemia, unspecified

## 2022-01-18 NOTE — DISCHARGE NOTE PROVIDER - HOSPITAL COURSE
Patient is a 89y old  Female who presents with a chief complaint of CVA (17 Jan 2022 19:40)    HPI:  Patient unable to provide history. Unable to reach family with number listed in EMR  89 years old female with h/o HTN, HLD, Afib, dementia present to ED with concern for stroke. Patient unable to give history and unable to reach family listed in EMR. Per chart, last seen normal was 3PM yesterday. Patient was noted to have right arm weakness.   EKG with Afib, . No leukocytosis, plt 237, INR 1, K 4.2, Cr 1.17, glucose 114, HsTnT 459, TSH 2.81, lactate 1.5, UA not suggestive of UTI, low digoxin level. CT head with no acute strole. Noted hold left MCA infarct. CT perfusion with extensive motion artifact. CTA head with Focal stenoses inferior left M2 branch. Age unknown. CTA neck with no  hemodynamically significant stenosis  SH: Is at home, living with is son and has been bed bound for years  FH: Non contributory    During the hospital stay patient status waxes and wanes between being alert on some days alternating with day of confusion and somnolence.   Patient was able to eat without any signs of aspiration. MRI of brain done and shows acute infarcts. Patient has right sided deficit and will need home assistance for ADLs. Patient will be discharge home as there is not a great potential for rehab since patient has been bed bound at home. Patient will need to be on statins, aspirin and anticoagulation for AFib.    RADIOLOGY & ADDITIONAL TESTS:  < from: MR Head No Cont (01.18.22 @ 10:37) >  IMPRESSION:  -Acute infarcts of the left pre and postcentral gyri.  -Chronic infarcts as described above.  -Extensive chronic small vessel disease.

## 2022-01-18 NOTE — PROGRESS NOTE ADULT - PROBLEM SELECTOR PLAN 6
HsTnT 495.5, TWI in V 4-6  On aspirin, statin, apixaban  Patient does not appear to be in chest pain or SOB  ECHO  telemetry monitoring  demand ischemia as a result of rapid Afib,   Conservative management statin, aspirin and beta blockers.
HsTnT 495.5, TWI in V 4-6  On aspirin, statin, apixaban  Patient does not appear to be in chest pain or SOB  telemetry monitoring  demand ischemia as a result of rapid Afib,   Conservative management statin, aspirin and beta blockers.

## 2022-01-18 NOTE — PROGRESS NOTE ADULT - PROBLEM SELECTOR PLAN 2
continue digoxin  Keep on tele  Patient is fall risk, spoke to son who wants anticoagulation  continue apixaban for anticoagulation
continue digoxin and amiodarone for now  Keep on tele  Patient is fall risk, spoke to son who wants anticoagulation  continue apixaban for anticoagulation

## 2022-01-18 NOTE — PROGRESS NOTE ADULT - PROBLEM SELECTOR PROBLEM 7
ROBERTO CARLOS (acute kidney injury)

## 2022-01-18 NOTE — PROGRESS NOTE ADULT - PROBLEM SELECTOR PLAN 3
Permissive HTN for 24 hours  Continue amlodipine 5 mg daily
Permissive HTN for now
Permissive HTN for 24 hours  Now add amlodipine 5 mg daily
Permissive HTN for 24 hours  Continue amlodipine 5 mg daily
Permissive HTN for 24 hours  Continue amlodipine 5 mg daily

## 2022-01-18 NOTE — DISCHARGE NOTE PROVIDER - CARE PROVIDER_API CALL
Refugio Rashid)  Internal Medicine  [address], [address]  [place] [address]  Phone: [phone]  Fax: [phone]  Follow Up Time: Routine

## 2022-01-18 NOTE — PROGRESS NOTE ADULT - PROBLEM SELECTOR PLAN 5
at risk for delirium

## 2022-01-18 NOTE — PROGRESS NOTE ADULT - PROBLEM SELECTOR PLAN 7
s/p IV contrast for imaging.  continue IV fluids, avoid nephrotoxins.

## 2022-01-18 NOTE — PROGRESS NOTE ADULT - PROBLEM SELECTOR PROBLEM 1
Acute cerebrovascular accident (CVA)

## 2022-01-18 NOTE — PROGRESS NOTE ADULT - PROBLEM SELECTOR PLAN 1
Brought in with concern for CVA, right arm weakness  Seems not moving right arm and right leg  CT head with no acute strole. Noted hold left MCA infarct. CT perfusion with extensive motion artifact. CTA head with Focal stenoses inferior left M2 branch. Age unknown. CTA neck with no  hemodynamically significant stenosis  Neurology input appreciated.  MRI of brain not done because patient not able to stay inside the tube  ECHO, carotid doppler, A1c, lipid panel  aspirin and high intensity statin  fall/aspiration precaution  Neuro check
Brought in with concern for CVA, right arm weakness  Seems not moving right arm and right leg  CT head with no acute strole. Noted hold left MCA infarct. CT perfusion with extensive motion artifact. CTA head with Focal stenoses inferior left M2 branch. Age unknown. CTA neck with no  hemodynamically significant stenosis  Neurology input appreciated.  MRI of brain has been ordered.   ECHO, carotid doppler, A1c, lipid panel  aspirin and high intensity statin  fall/aspiration precaution  Neuro check
Brought in with concern for CVA, right arm weakness  Seems not moving right arm and right leg  CT head with no acute strole. Noted hold left MCA infarct. CT perfusion with extensive motion artifact. CTA head with Focal stenoses inferior left M2 branch. Age unknown. CTA neck with no  hemodynamically significant stenosis  Neurology input appreciated.  MRI of brain has been ordered.   ECHO, carotid doppler, A1c, lipid panel  aspirin and high intensity statin  PT/OT/Speech luis angel  fall/aspiration precaution  Neuro check
Brought in with concern for CVA, right arm weakness  Seems not moving right arm and leg  CT head with no acute strole. Noted hold left MCA infarct. CT perfusion with extensive motion artifact. CTA head with Focal stenoses inferior left M2 branch. Age unknown. CTA neck with no  hemodynamically significant stenosis  Neurology input appreciated.  MRI of brain shows acute cva.  aspirin and high intensity statin  fall/aspiration precaution  Neuro check
Brought in with concern for CVA, right arm weakness  Seems not moving right arm and leg  CT head with no acute strole. Noted hold left MCA infarct. CT perfusion with extensive motion artifact. CTA head with Focal stenoses inferior left M2 branch. Age unknown. CTA neck with no  hemodynamically significant stenosis  Neurology input appreciated.  MRI of brain not done because patient not able to stay inside the tube  ECHO, carotid doppler, A1c, lipid panel  aspirin and high intensity statin  fall/aspiration precaution  Neuro check

## 2022-01-18 NOTE — PROGRESS NOTE ADULT - PROBLEM SELECTOR PROBLEM 2
Unspecified atrial fibrillation

## 2022-01-19 NOTE — DISCHARGE NOTE NURSING/CASE MANAGEMENT/SOCIAL WORK - PATIENT PORTAL LINK FT
You can access the FollowMyHealth Patient Portal offered by [facility] by registering at the following website: [email or web address]. By joining [employer]’s FollowMyHealth portal, you will also be able to view your health information using other applications (apps) compatible with our system.

## 2022-01-19 NOTE — DISCHARGE NOTE NURSING/CASE MANAGEMENT/SOCIAL WORK - NSDCPEFALRISK_GEN_ALL_CORE
For information on Fall & Injury Prevention, visit: [email or web address] OR  [email or web address] OR  https://www.cdc.gov/steadi/patient.html

## 2022-01-19 NOTE — DISCHARGE NOTE NURSING/CASE MANAGEMENT/SOCIAL WORK - NSDCVIVACCINE_GEN_ALL_CORE_FT
Tdap; 12-Dec-2020 22:56; Elsie Galan (VELASQUEZ); [employer]; [id number] (Exp. Date: 21-Jul-2022); IntraMuscular; Deltoid Right.; 0.5 milliLiter(s); VIS (VIS Published: 09-May-2013, VIS Presented: 12-Dec-2020);

## 2022-03-07 NOTE — ED ADULT NURSE NOTE - NS ED NURSE LEVEL OF CONSCIOUSNESS AFFECT
Pt has been having some days of depression as well as times of anxiety, isn't sure if this is related to the vivelle patch that she started, not sure if this will get better with time or is it a side effect, Calm

## 2022-06-10 NOTE — ED PROVIDER NOTE - PROGRESS NOTE DETAILS
Víctor, PGY3: vascular consulted for limb ischemia Víctor, PGY3: leg not viable, not surgical candidate, spoke with vascular attg. will admit to medicine.
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
## 2022-06-10 NOTE — ED PROVIDER NOTE - ATTENDING APP SHARED VISIT CONTRIBUTION OF CARE
Patient evaluated and seen with HANG Ha agree with above history and physical - pt examined and seen by me personally - findings as seen: Pt with left foot ischemia, blue discoloration with no pulse noted from left knee downward. Otherwise discussed with family - suspected afib with thrombus likely cause of ischemic foot - otherwise will transfer to [facility] for vascular surgery consult and care and emergent lights and sirens transfer, given diltiazem/morphine for rapid afib with improvement of HR from 160s to 120s, otherwise will start Heparin in ED - will tx to Dr Diana for further care.

## 2022-06-10 NOTE — ED ADULT NURSE NOTE - CADM POA URETHRAL CATHETER
Behavioral Health Interdisciplinary Rounds     Patient Name: Carla Joel  Age: 35 y.o. Room/Bed:  730/  Primary Diagnosis: <principal problem not specified>   Admission Status: Voluntary Commitment     Readmission within 30 days: no  Power of  in place: no  Patient requires a blocked bed: yes          Reason for blocked bed: behavior    VTE Prophylaxis: No    Mobility needs/Fall risk: no  Flu Vaccine : no   Nutritional Plan: no  Consults:          Labs/Testing due today?: no    Sleep hours:  6+      Participation in Care/Groups:  yes  Medication Compliant?: Yes  PRNS (last 24 hours): PO antianxiety    Restraints (last 24 hours):  no     CIWA (range last 24 hours):     COWS (range last 24 hours):      Alcohol screening (AUDIT) completed -   AUDIT Score: 2     If applicable, date SBIRT discussed in treatment team AND documented:   AUDIT Screen Score: AUDIT Score: 2      Tobacco - patient is a smoker: Have You Used Tobacco in the Past 30 Days: Yes  Illegal Drugs use: Have You Used Any Illegal Substances Over the Past 12 Months: Yes    24 hour chart check complete: yes     Patient goal(s) for today:   Treatment team focus/goals: Plan for discharge on Thursday   Progress note:   LOS:  4  Expected LOS: TBD    Financial concerns/prescription coverage:  no  Family contact:       Family requesting physician contact today:  no  Discharge plan: he will return to his room for rent. Access to weapons : no        Outpatient provider(s): [facility]  Patient's preferred phone number for follow up call :     Participating treatment team members: Nash Oliva, [address] Sacha Sorensen, GLENNA - . No

## 2022-06-10 NOTE — ED PROVIDER NOTE - OBJECTIVE STATEMENT
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 As per EMS, the family noticed that patient's left lower leg turning colder and blue 2 days ago and decided to call EMS today for further evaluation. 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 As per EMS, the family noticed that patient's left lower leg turning colder and blue 2 days ago and decided to call EMS today for further evaluation. Received cardizem 10mg and 4mg morphine prior to arrival.

## 2022-06-10 NOTE — ED PROVIDER NOTE - CLINICAL SUMMARY MEDICAL DECISION MAKING FREE TEXT BOX
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 Plan vascular consult, CTA and admission. 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 In Afib w/ RVR and febrile. Possible infection. Will check wbc, lactate, cultures, and imaging. give IV fluids and antibiotics and reassess. Plan vascular consult, CTA and admission.

## 2022-06-10 NOTE — ED PROVIDER NOTE - NS ED ATTENDING STATEMENT MOD
This was a shared visit with the KENDALL. I reviewed and verified the documentation and independently performed the documented:

## 2022-06-10 NOTE — ED ADULT NURSE REASSESSMENT NOTE - NS ED NURSE REASSESS COMMENT FT1
Bradley catheter insertion completed, 3 RNs at bedside, sterile technique maintained, approx. 300 ml cloudy yellow urine drained, patient tolerated well, safety maintained.

## 2022-06-10 NOTE — ED PROVIDER NOTE - PHYSICAL EXAMINATION
GEN:   uncomfortable appearing, alert and oriented x0  EYES:   PERRL, extra-occular movements intact  HEENT:   airway patent, moist mucosal membranes, uvula midline  CV:  Pulses- Radial: 2+ bilateral and equal  RESP:   clear to auscultation bilaterally,  ABD:   soft, non tender, no guarding  MSK:   moving all extremities except for left lower leg.  Ecchymosis, cooler temperature and no pulses started from left knee, worsening in left ankle and foot.  RLE 2+ DP / PT pulses.  SKIN:   dry, no rash  NEURO:   alert, disoriented

## 2022-06-10 NOTE — CONSULT NOTE ADULT - ATTENDING COMMENTS
Seen w res and fellow shortly after pt arrival at [facility].  dementia, non-communicative.  ABd S/NT/ND  R LE warm/norm color.  L LE: adv isch changes incl coolness, non-blanching cyanosis, of the toes/foot/ankle up to mid calf.   +fem pulses  Dist pulses NP, No DS    CTA reviewed:   Irreg Ao plaque.  B/L iliacs/CFA/PFAs patent.    A/P:   L LE isch.  Non-salvageable.  Pt w major comorbidities/Afib/cva   Poor functional status    DW'ed pt's son and daughter in Law: THreat to life/limb explained.  They request conserv mgmt.    Rec:   Med/pall care  AC if safe.  Offload pressure points/L foot.  L AKA is a surg option If overall cond stabilizes and L LE demarcates however in this pt would rec to favor palliation.

## 2022-06-10 NOTE — ED PROVIDER NOTE - ATTENDING CONTRIBUTION TO CARE
Attending MD Alvarez:  I personally have seen and examined this patient. I have performed a substantive portion of the visit including all aspects of the medical decision making.  Resident note reviewed and agree on plan of care and except where noted.      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 Patient also in rapid afib to 140s. Exam with cold left foot with bluish discoloration concerning for acute limb ischemia, vascular surgery at bedside recommending CTA w/run off, prognosis is poor though given advanced ischemia already but will f/u. Continue IV heparin, febrile to 100.3, will pan culture and administer IV abx, will consider careful rate control after further diagnostics           *The above represents an initial assessment/impression. Please refer to progress notes for potential changes in patient clinical course*

## 2022-06-10 NOTE — ED PROVIDER NOTE - CLINICAL SUMMARY MEDICAL DECISION MAKING FREE TEXT BOX
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   Family wants everything done including surgery if needed.  Transfer center contacted for TIER 1 transfer, Dr. Diana accepting patient to [facility].  Will provide cardizem for tachycardia, Morpine for pain, labs, IVF

## 2022-06-10 NOTE — ED ADULT NURSE NOTE - OBJECTIVE STATEMENT
*break coverage RN* pt is 89 y/o female BIBA for LLE pain. pt left lower extremity cyanotic with absent pulse. +2 pedal pulse on R foot. pt moaning in pain. AAOX0, confused, responsive to pain, unknown baseline. 12 lead ekg completed, afib w/ RVR, cardizem 10mg IV push given. pt placed on cardiac monitor. pt put on 3L O2 NC. skin on sacrum intact. rectal temp checked, afebrile. bilateral 20g IV placed. labs drawn and sent. pt started on heparin drip infusing @10ml/hr, initial heprin bolus given. pt transferred to [facility]. primary RN Deepa made aware. fall with harm precautions maintained. safety maintained.

## 2022-06-10 NOTE — CONSULT NOTE ADULT - ASSESSMENT
FULL CONSULT NOTE TO FOLLOW    - Pt transferred from [facility] for concern for acute limb ischemia  - LLE with non dopplerable DP/PT/popliteal signals. L foot and calf with mottling throughout.   - Suspect [place] 3 acute limb ischemia, limb likely nonsaveagable  - Recommend Medicine admission  - Will wait for leg to demarcate and plan for AKA at some point this admisison    Patient seen and discussed with fellow, Dr. Fowler, and attending, Dr. Diana.       Tonya Osorio MD  PGY2 Consult Resident  Vascular Surgery  [id number]   ASSESSMENT/PLAN: 89F PMHx of CVA w/ old left MCA infarct, AFIB on warfarin/diltiazem/digoxin, HTN, HLD, CAD, dementia transferred from [facility] for concern for LLE acute limb ischemia. Evidence of [place] 3 acute limb ischemia on physical exam with arterial occlusive disease below L femoral noted on imaging. Given duration and severity0 of symptoms, leg likely unsalvagable. Patient's comorbidities and functional status also make her a poor operative candidate. Per discussion between attending and family, will pursue conservative management at this time.     - Recommend Medicine admission  - Continue hep gtt  - Will wait for leg to demarcate and plan for AKA at some point this admission    Patient seen and discussed with fellow, Dr. Fowler, and attending, Dr. Diana.     Tonya Osorio MD  PGY2 Consult Resident  Vascular Surgery  [phone]

## 2022-06-10 NOTE — ED ADULT NURSE NOTE - OBJECTIVE STATEMENT
Patient is a 89 y/o female with PMH of CVA, HTN, HLD, CAD, dementia, A-fib on Warfarin presenting to the ED via EMS transferred from [place] with c/o left lower leg ischemia. As per EMS patient family noticed left lower leg turning blue/purple and cold x2 days. Patient received 10mg cardizem and 4mg morphine prior to arrival to [facility] ED. Patient A&Ox0, nonverbal at baseline, patient moaning. Airway patent, breathing slightly labored, accessory muscle use noted, placed on 3L NC. No pulse palpable in left lower leg, other peripheral pulses weak. Left lower leg edematous, purple/blue color. Temp 100.3 rectally. Patient in A-fib, cardiac monitoring maintained. IV access obtained, labs sent, will cont to monitor.

## 2022-06-10 NOTE — ED PROVIDER NOTE - CARE PLAN
Principal Discharge DX:	Critical limb ischemia of left lower extremity  Secondary Diagnosis:	Atrial fibrillation with rapid ventricular response   1

## 2022-06-10 NOTE — ED PROVIDER NOTE - OBJECTIVE STATEMENT
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  History and HPI obtained from patient's mother in law and son (son Kieran [phone] and daughter in law Michelle [phone]).  They state they started to notice that patient's left lower leg turning colder and blue 2 days ago and decided to call EMS today for further evaluation.  They state they would like everything done including surgery if needed.

## 2022-06-10 NOTE — ED PROVIDER NOTE - PHYSICAL EXAMINATION
Gen: contracted on L side, moaning toxic appearing  Head: normal appearing  HEENT: normal conjunctiva, oral mucosa dry  Lung: no respiratory distress, CTA b/l     CV: irregularly irreular rhythm, no murmurs  Abd: soft, non distended, non tender   MSK: no visible deformities  Neuro: No focal deficits, AAOx0  Extremities: cold lower extremities without palpable pulses on DP, PT, popliteal  Skin: purple color in L LE extremity with pain out of proportion  Psych: dementia affect

## 2022-06-11 NOTE — H&P ADULT - PROBLEM SELECTOR PLAN 6
DVT/PE ppx: Heparin drip   Diet: NPO pending dysphagia screen  Code: Full code pending family discussion Advanced dementia hx of bed bound and non-verbal per chart.   - F/u with family on code status and GOC   - Confirm function status   - Bedside Nurse swallow test if dysphagia screen + will require form S&S   - C/w home SSRI if po tolerant

## 2022-06-11 NOTE — PROGRESS NOTE ADULT - PROBLEM SELECTOR PLAN 6
Advanced dementia hx of bed bound and non-verbal per chart.   - F/u with family on code status and GOC   - Confirm function status   - Pt failed bedside dysphagia screen. Will keep NPO until S&S eval  - C/w home SSRI if po tolerant

## 2022-06-11 NOTE — H&P ADULT - PROBLEM SELECTOR PLAN 2
Patient unable to communicate symptoms.   - 06/10: UA +LE, +Bacteria, +WBCs, given zosyn and vanc in ED, 1L given   - 06/11: 500cc given   Plan:   - Start Ceftriaxone 1g qdaily   - F/u Blood and urine cultures   - Trend fever curves   - Leukocytosis noted, trend CBC w/ difff   - Will give fluid as needed Patient unable to communicate symptoms.   - 06/10: UA +LE, +Bacteria, +WBCs, given zosyn and vanc in ED, 1L given   - 06/11: 500cc given     Plan:   - Start Ceftriaxone 1g qdaily   - F/u Blood and urine cultures   - Trend fever curves   - Leukocytosis noted, trend CBC w/ diff   - Will give fluid as needed Patient unable to communicate symptoms.   - 06/10: UA +LE, +Bacteria, +WBCs, given zosyn and vanc in ED, 1L given   - 06/11: 500cc given     Plan:   - Start cefepime and vancomycin   - F/u Blood and urine cultures   - Trend fever curves   - Leukocytosis noted, trend CBC w/ diff   - Will give fluid as needed

## 2022-06-11 NOTE — PROGRESS NOTE ADULT - PROBLEM SELECTOR PLAN 1
Acute ischemic limb likely 2/2 to thromboembolic event.   - 06/10: Vascular Surgery consult, determined to have    Plan:   - Vascular recommendations appreciated   - C/w Heparin infusion   - Off load pressure points on L foot   - Possible L AKA pending clinical course and discussion with family  - Maintain active type and screen Acute ischemic limb likely 2/2 to thromboembolic event.   - 06/10: Vascular Surgery consult, determined to have    Plan:   - Vascular recommendations appreciated   - C/w Heparin infusion   - Off load pressure points on L foot   - Possible L AKA pending clinical course and discussion with family  - Maintain active type and screen  -Pain control with IV tylenol and IV dilaudid

## 2022-06-11 NOTE — PROGRESS NOTE ADULT - PROBLEM SELECTOR PLAN 3
Mild troponin elevation likely in the setting of demand ischemia.   - Troponin T 64 at admission, downtrending Mild troponin elevation likely in the setting of demand ischemia.   - Troponin T 64 at admission, now downtrending

## 2022-06-11 NOTE — H&P ADULT - ASSESSMENT
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 Patient seen by vascular surgery and will require ambutation of the L leg. Course complicated by a UTI.  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 Patient seen by vascular surgery and will require ambutation of the L leg. Course complicated by a ROBERTO CARLOS and UTI.

## 2022-06-11 NOTE — PROVIDER CONTACT NOTE (OTHER) - BACKGROUND
Pt admitted for left lower foot swelling/discoloration (purple). Foot is pulseless. Heparin gtt at 12 mL/hr. Lr at 100mL/hr for 5 hours

## 2022-06-11 NOTE — H&P ADULT - PROBLEM SELECTOR PLAN 1
Acute ischemic limb likely 2/2 to thromboembolic event.   - 06/10: Vascular Surgery consult, determined to have    Plan:   - Vascular recommendations appreciated   - C/w Heparin infusion   - Off load pressure points on L foot   - Possible L AKA pending clinical course and discussion with family Acute ischemic limb likely 2/2 to thromboembolic event.   - 06/10: Vascular Surgery consult, determined to have    Plan:   - Vascular recommendations appreciated   - C/w Heparin infusion   - Off load pressure points on L foot   - Possible L AKA pending clinical course and discussion with family  - Maintain active type and screen

## 2022-06-11 NOTE — PROGRESS NOTE ADULT - PROBLEM SELECTOR PLAN 4
Most likely 2/2 to pre-renal etiology. Cr: BUN ratio 1:42 consistent with pre-renal and was fluid responsive.   - s/p 1.5L   - Will give more PRN Most likely 2/2 to pre-renal etiology. Cr: BUN ratio 1:42 consistent with pre-renal and was fluid responsive.   - s/p 1.5L   - now downtrending. will cont to monitor

## 2022-06-11 NOTE — PROGRESS NOTE ADULT - PROBLEM SELECTOR PLAN 5
Hx of afib. Unable for further hx. Chart review Eliquis.   - Hold Eliquis and will discuss surgery plan with Vascular team  - C/w heparin drip  - C/w home metoprolol 25mg BID as tolerated with hold parameters  - Hold diltiazem given pressure   - C/w home digoxin if po tolerant  - Digoxin level in am Hx of afib. Unable for further hx. Chart review Eliquis.   - Hold Eliquis and will discuss surgery plan with Vascular team  - C/w heparin drip  - Pt unable to take PO. Will start patient on lopressor 5mg Q6  - Hold diltiazem given pressure   - Will start home digoxin if Afib not controlled   - Digoxin level in am pending

## 2022-06-11 NOTE — PROGRESS NOTE ADULT - PROBLEM SELECTOR PLAN 8
DVT/PE ppx: Heparin drip   Diet: NPO pending dysphagia screen  Code: Full code pending family discussion  Attempted to reach family x2 with both numbers not working. DVT/PE ppx: Heparin drip   Diet: NPO pending S&S eval   Code: Full code pending family discussion  Attempted to reach family x2 with both numbers not working. DVT/PE ppx: Heparin drip   Diet: NPO pending S&S eval   Code: DNR/DNI. No pressors  Family called back. Updated number is [phone] (Michelle Zhou- daughter in law)

## 2022-06-11 NOTE — H&P ADULT - ATTENDING COMMENTS
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 Appreciate vascular surgery recommendations, non-salvageable leg with possible need for L AKA vs palliation. Housestaff and I attempted to reach family to discuss GOC but both phone numbers in charge are disconnected. As per collateral obtained from vascular surgery team and ER team, family favored conservative management, pt tentatively remains Full Code. Will cont. broad spectrum antibiotic coverage for time being.   -Cont. IV vancomycin and cefepime for now  -On heparin gtt, repeat PTT and cardiac enzymes  -Need GOC discussion with family, updated contact information. Full Code for now  -F/u vascular recommendations  -Gentle IVF overnight  -Palliative care consult if contact information for family can be obtained

## 2022-06-11 NOTE — H&P ADULT - HISTORY OF PRESENT ILLNESS
History obtained per chart review as patient is unable to communicate and both numbers in the EMR are not functioning.     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 As per EMS, the family noticed that patient's left lower leg turning colder and blue 2 days ago and decided to call EMS today for further evaluation. Received cardizem 10mg and 4mg morphine prior to arrival.    In the ED:   Vitals: , /110, Temp 100.3  Labs: WBC 21.6, Platelets 104, Na 148, Cr 1.41, Troponin 429.3  Interventions: heparin drip started, Vascular consulted  History obtained per chart review as patient is unable to communicate and both numbers in the EMR are not functioning.     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 As per EMS, the family noticed that patient's left lower leg turning colder and blue 2 days ago and decided to call EMS today for further evaluation. Received cardizem 10mg and 4mg morphine prior to arrival.    In the ED:   Vitals: , /110, Temp 100.3  Labs: WBC 21.6, Platelets 104, Na 148, Cr 1.41, Troponin I 429.3  Interventions: heparin drip started, Vascular consulted  History obtained per chart review as patient is unable to communicate and both numbers in the EMR are not functioning attempted to reach family x2.     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 As per EMS, the family noticed that patient's left lower leg turning colder and blue 2 days ago and decided to call EMS today for further evaluation. Received cardizem 10mg and 4mg morphine prior to arrival.    In the ED:   Vitals: , /110, Temp 100.3  Labs: WBC 21.6, Platelets 104, Na 148, Cr 1.41, Troponin I 429.3  Interventions: heparin drip started, Vascular consulted

## 2022-06-11 NOTE — H&P ADULT - NSHPLABSRESULTS_GEN_ALL_CORE
13.3   22.12 )-----------( 106      ( 10 Tobi 2022 19:37 )             44.1     06-10-22 @ 19:37    149<H>  |  114<H>  |  58<H>             --------------------------< 102<H>     5.0  |  22  | 1.13    eGFR AA: --  eGFR N-AA: --    Calcium: 8.8  Phosphorus: --  Magnesium: --    AST: 71<H>    ALT: 46<H>  AlkPhos: 84  Protein: 7.1  Albumin: 2.7<L>  TBili: 0.8  D-Bili: --  06-10-22 @ 17:47    148<H>  |  117<H>  |  59<H>             --------------------------< 126<H>     5.8<H>  |  27  | 1.41<H>    eGFR AA: --  eGFR N-AA: --    Calcium: 9.2  Phosphorus: --  Magnesium: --    AST: 115<H>    ALT: 59  AlkPhos: 85  Protein: 7.9  Albumin: 2.7<L>  TBili: 0.8  D-Bili: -- 13.3   22.12 )-----------( 106      ( 10 Tobi 2022 19:37 )             44.1     06-10-22 @ 19:37    149<H>  |  114<H>  |  58<H>             --------------------------< 102<H>     5.0  |  22  | 1.13    eGFR AA: --  eGFR N-AA: --    Calcium: 8.8  Phosphorus: --  Magnesium: --    AST: 71<H>    ALT: 46<H>  AlkPhos: 84  Protein: 7.1  Albumin: 2.7<L>  TBili: 0.8  D-Bili: --  06-10-22 @ 17:47    148<H>  |  117<H>  |  59<H>             --------------------------< 126<H>     5.8<H>  |  27  | 1.41<H>    eGFR AA: --  eGFR N-AA: --    Calcium: 9.2  Phosphorus: --  Magnesium: --    AST: 115<H>    ALT: 59  AlkPhos: 85  Protein: 7.9  Albumin: 2.7<L>  TBili: 0.8  D-Bili: --    I have reviewed the labs, imaging and ekg

## 2022-06-11 NOTE — PROGRESS NOTE ADULT - CONVERSATION DETAILS
Talked to son and daughter in law who states that pt would want to be DNR and DNI. They also agreed on no pressors. Family wants everything else done for patient including antibiotics. discussed with family that prognosis is poor and that medical management will not improve patients condition. Family understands. Will consult palliative care for further goals of care discussion with family

## 2022-06-11 NOTE — H&P ADULT - PROBLEM SELECTOR PLAN 5
Hx of hypertension. Currently hypotensive.  - Hold antihypertensives Hx of afib. Unable for further hx. Chart review Eliquis.   - Hold Eliquis and will discuss surgery plan with Vascular team  - C/w heparin drip  - C/w home metoprolol 25mg BID as tolerated with hold parameters  - Hold diltiazem given pressure   - C/w home digoxin if po tolerant  - Digoxin level in am

## 2022-06-11 NOTE — PATIENT PROFILE ADULT - FALL HARM RISK - HARM RISK INTERVENTIONS

## 2022-06-11 NOTE — H&P ADULT - PROBLEM SELECTOR PLAN 8
DVT/PE ppx: Heparin drip   Diet: NPO pending dysphagia screen  Code: Full code pending family discussion  Attempted to reach family x2 with both numbers not working.

## 2022-06-11 NOTE — H&P ADULT - PROBLEM SELECTOR PLAN 4
Advanced dementia hx of bed bound and non-verbal per chart.   - F/u with family on code status and GOC   - Confirm function status   - Bedside Nurse swallow test if dysphagia screen + will require form S&S   - C/w home SSRI if po tolerant Most likely 2/2 to pre-renal etiology. Cr: BUN ratio 1:42 consistent with pre-renal and was fluid responsive.   - s/p 1.5L   - Will give more PRN

## 2022-06-11 NOTE — PROGRESS NOTE ADULT - SUBJECTIVE AND OBJECTIVE BOX
PROGRESS NOTE:   Authored by Nahomy Garcia MD  Pager: [facility] [phone]; [facility] [phone]    Patient is a 89y old  Female who presents with a chief complaint of Ischemic limb (2022 00:48)      SUBJECTIVE / OVERNIGHT EVENTS:  Patient unable to express concerns.     ADDITIONAL REVIEW OF SYSTEMS:    MEDICATIONS  (STANDING):  atorvastatin 40 milliGRAM(s) Oral at bedtime  cefepime   IVPB 1000 milliGRAM(s) IV Intermittent every 12 hours  cyanocobalamin 1000 MICROGram(s) Oral daily  digoxin     Tablet 125 MICROGram(s) Oral daily  escitalopram 5 milliGRAM(s) Oral daily  folic acid 1 milliGRAM(s) Oral daily  heparin  Infusion.  Unit(s)/Hr (11 mL/Hr) IV Continuous <Continuous>  influenza  Vaccine (HIGH DOSE) 0.7 milliLiter(s) IntraMuscular once  lactated ringers. 500 milliLiter(s) (100 mL/Hr) IV Continuous <Continuous>  metoprolol tartrate 25 milliGRAM(s) Oral two times a day  metoprolol tartrate Injectable 5 milliGRAM(s) IV Push once  multivitamin 1 Tablet(s) Oral daily  vancomycin  IVPB      vancomycin  IVPB 750 milliGRAM(s) IV Intermittent once    MEDICATIONS  (PRN):  heparin   Injectable 4500 Unit(s) IV Push every 6 hours PRN For aPTT less than 40  heparin   Injectable 2000 Unit(s) IV Push every 6 hours PRN For aPTT between 40 - 57  polyethylene glycol 3350 17 Gram(s) Oral daily PRN Constipation      CAPILLARY BLOOD GLUCOSE        I&O's Summary      PHYSICAL EXAM:  Vital Signs Last 24 Hrs  T(C): 36.8 (2022 07:09), Max: 37.9 (10 Tobi 2022 18:21)  T(F): 98.3 (2022 07:09), Max: 100.3 (10 Tobi 2022 18:21)  HR: 135 (2022 07:09) (96 - 152)  BP: 106/72 (2022 07:09) (91/70 - 147/65)  BP(mean): --  RR: 18 (2022 07:09) (18 - 26)  SpO2: 95% (2022 07:09) (88% - 99%)    GENERAL: NAD, Cachectic, malnourished   HEAD:  Atraumatic, Normocephalic  EYES: EOMI, PERRLA, conjunctiva and sclera clear  ENT: Moist mucous membranes  NECK: Supple, No JVD  CHEST/LUNG: Clear to auscultation bilaterally; No rales, rhonchi, wheezing, or rubs. Intermittent crackles with mucus. Unlabored respirations  HEART: Irregular rate and rhythm; No murmurs, rubs, or gallops  ABDOMEN: Bowel sounds present; Soft, Nontender, Nondistended. No hepatomegaly  EXTREMITIES:  LLE cold cool w/o any palpable pulses,   NERVOUS SYSTEM:  Alert & Oriented X0, speech clear. Limited by mental status   SKIN: mottled with significant skin changes LLE    LABS:                        13.4   18.39 )-----------( 101      ( 2022 04:28 )             45.7     06-10    149<H>  |  114<H>  |  58<H>  ----------------------------<  102<H>  5.0   |  22  |  1.13    Ca    8.8      10 Tobi 2022 19:37    TPro  7.1  /  Alb  2.7<L>  /  TBili  0.8  /  DBili  x   /  AST  71<H>  /  ALT  46<H>  /  AlkPhos  84  06-10    PT/INR - ( 10 Tobi 2022 19:37 )   PT: 15.1 sec;   INR: 1.31 ratio         PTT - ( 2022 04:28 )  PTT:57.1 sec  CARDIAC MARKERS ( 2022 04:55 )  x     / x     / x     / x     / 16.2 ng/mL      Urinalysis Basic - ( 10 Tobi 2022 20:54 )    Color: Yellow / Appearance: Slightly Turbid / S.053 / pH: x  Gluc: x / Ketone: Negative  / Bili: Negative / Urobili: Negative   Blood: x / Protein: 100 / Nitrite: Positive   Leuk Esterase: Large / RBC: 3 /hpf /  /HPF   Sq Epi: x / Non Sq Epi: 1 /hpf / Bacteria: Many          RADIOLOGY & ADDITIONAL TESTS:  Results Reviewed:   Imaging Personally Reviewed:  Electrocardiogram Personally Reviewed:    COORDINATION OF CARE:  Care Discussed with Consultants/Other Providers [Y/N]:  Prior or Outpatient Records Reviewed [Y/N]:   PROGRESS NOTE:   Authored by Nahomy Garcia MD  Pager: [facility] [phone]; [facility] [phone]    Patient is a 89y old  Female who presents with a chief complaint of Ischemic limb (2022 00:48)      SUBJECTIVE / OVERNIGHT EVENTS:  Patient unable to express concerns. Only able to say "I love you" with any questions     ADDITIONAL REVIEW OF SYSTEMS:    MEDICATIONS  (STANDING):  atorvastatin 40 milliGRAM(s) Oral at bedtime  cefepime   IVPB 1000 milliGRAM(s) IV Intermittent every 12 hours  cyanocobalamin 1000 MICROGram(s) Oral daily  digoxin     Tablet 125 MICROGram(s) Oral daily  escitalopram 5 milliGRAM(s) Oral daily  folic acid 1 milliGRAM(s) Oral daily  heparin  Infusion.  Unit(s)/Hr (11 mL/Hr) IV Continuous <Continuous>  influenza  Vaccine (HIGH DOSE) 0.7 milliLiter(s) IntraMuscular once  lactated ringers. 500 milliLiter(s) (100 mL/Hr) IV Continuous <Continuous>  metoprolol tartrate 25 milliGRAM(s) Oral two times a day  metoprolol tartrate Injectable 5 milliGRAM(s) IV Push once  multivitamin 1 Tablet(s) Oral daily  vancomycin  IVPB      vancomycin  IVPB 750 milliGRAM(s) IV Intermittent once    MEDICATIONS  (PRN):  heparin   Injectable 4500 Unit(s) IV Push every 6 hours PRN For aPTT less than 40  heparin   Injectable 2000 Unit(s) IV Push every 6 hours PRN For aPTT between 40 - 57  polyethylene glycol 3350 17 Gram(s) Oral daily PRN Constipation      CAPILLARY BLOOD GLUCOSE        I&O's Summary      PHYSICAL EXAM:  Vital Signs Last 24 Hrs  T(C): 36.8 (2022 07:09), Max: 37.9 (10 Tobi 2022 18:21)  T(F): 98.3 (2022 07:09), Max: 100.3 (10 Tobi 2022 18:21)  HR: 135 (2022 07:09) (96 - 152)  BP: 106/72 (2022 07:09) (91/70 - 147/65)  BP(mean): --  RR: 18 (2022 07:09) (18 - 26)  SpO2: 95% (2022 07:09) (88% - 99%)    GENERAL: NAD, Cachectic, malnourished   HEAD:  Atraumatic, Normocephalic  EYES: EOMI, PERRLA, conjunctiva and sclera clear  ENT: Moist mucous membranes  NECK: Supple, No JVD  CHEST/LUNG: Clear to auscultation bilaterally; No rales, rhonchi, wheezing, or rubs. Intermittent crackles with mucus. Unlabored respirations  HEART: Irregular rate and rhythm; No murmurs, rubs, or gallops  ABDOMEN: Bowel sounds present; Soft, Nontender, Nondistended. No hepatomegaly  EXTREMITIES:  LLE cold cool w/o any palpable pulses,   NERVOUS SYSTEM:  Alert & Oriented X0, speech clear. Limited by mental status   SKIN: mottled with significant skin changes LLE    LABS:                        13.4   18.39 )-----------( 101      ( 2022 04:28 )             45.7     06-10    149<H>  |  114<H>  |  58<H>  ----------------------------<  102<H>  5.0   |  22  |  1.13    Ca    8.8      10 Tobi 2022 19:37    TPro  7.1  /  Alb  2.7<L>  /  TBili  0.8  /  DBili  x   /  AST  71<H>  /  ALT  46<H>  /  AlkPhos  84  06-10    PT/INR - ( 10 Tobi 2022 19:37 )   PT: 15.1 sec;   INR: 1.31 ratio         PTT - ( 2022 04:28 )  PTT:57.1 sec  CARDIAC MARKERS ( 2022 04:55 )  x     / x     / x     / x     / 16.2 ng/mL      Urinalysis Basic - ( 10 Tobi 2022 20:54 )    Color: Yellow / Appearance: Slightly Turbid / S.053 / pH: x  Gluc: x / Ketone: Negative  / Bili: Negative / Urobili: Negative   Blood: x / Protein: 100 / Nitrite: Positive   Leuk Esterase: Large / RBC: 3 /hpf /  /HPF   Sq Epi: x / Non Sq Epi: 1 /hpf / Bacteria: Many          RADIOLOGY & ADDITIONAL TESTS:  Results Reviewed:   Imaging Personally Reviewed:  Electrocardiogram Personally Reviewed:    COORDINATION OF CARE:  Care Discussed with Consultants/Other Providers [Y/N]:  Prior or Outpatient Records Reviewed [Y/N]:

## 2022-06-11 NOTE — H&P ADULT - PROBLEM SELECTOR PLAN 3
Hx of afib. Unable for further hx. Chart review eliquis.   - Hold eliquis and will discuss surgery plan with Vascular team  - C/w heparin drip  - C/w home metoprolol 25mg BID as tolerated with hold parameters  - Hold diltiazem given pressure   - C/w home digoxin if po tolerant Hx of afib. Unable for further hx. Chart review eliquis.   - Hold eliquis and will discuss surgery plan with Vascular team  - C/w heparin drip  - C/w home metoprolol 25mg BID as tolerated with hold parameters  - Hold diltiazem given pressure   - C/w home digoxin if po tolerant  - Digoxin level in am Mild troponin elevation likely in the setting of demand ischemia.   - Trend troponins to peak   - Troponin T 64, will repeat with next blood draw

## 2022-06-11 NOTE — PROGRESS NOTE ADULT - ASSESSMENT
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 Patient seen by vascular surgery and will require ambutation of the L leg. Course complicated by a ROBERTO CARLOS and UTI.

## 2022-06-11 NOTE — H&P ADULT - NSHPPHYSICALEXAM_GEN_ALL_CORE
PHYSICAL EXAM:  GENERAL: NAD, Cachectic, malnourished   HEAD:  Atraumatic, Normocephalic  EYES: EOMI, PERRLA, conjunctiva and sclera clear  ENT: Moist mucous membranes  NECK: Supple, No JVD  CHEST/LUNG: Clear to auscultation bilaterally; No rales, rhonchi, wheezing, or rubs. Unlabored respirations  HEART: Regular rate and rhythm; No murmurs, rubs, or gallops  ABDOMEN: Bowel sounds present; Soft, Nontender, Nondistended. No hepatomegaly  EXTREMITIES:  LLE cold cool w/o any palpable pulses,   NERVOUS SYSTEM:  Alert & Oriented X0, speech clear. Limited by mental status   SKIN: mottled with significant skin changes LLE PHYSICAL EXAM:  GENERAL: NAD, Cachectic, malnourished   HEAD:  Atraumatic, Normocephalic  EYES: EOMI, PERRLA, conjunctiva and sclera clear  ENT: Moist mucous membranes  NECK: Supple, No JVD  CHEST/LUNG: Clear to auscultation bilaterally; No rales, rhonchi, wheezing, or rubs. Unlabored respirations  HEART: Irregular rate and rhythm; No murmurs, rubs, or gallops  ABDOMEN: Bowel sounds present; Soft, Nontender, Nondistended. No hepatomegaly  EXTREMITIES:  LLE cold cool w/o any palpable pulses,   NERVOUS SYSTEM:  Alert & Oriented X0, speech clear. Limited by mental status   SKIN: mottled with significant skin changes LLE PHYSICAL EXAM:  GENERAL: NAD, Cachectic, malnourished   HEAD:  Atraumatic, Normocephalic  EYES: EOMI, PERRLA, conjunctiva and sclera clear  ENT: Moist mucous membranes  NECK: Supple, No JVD  CHEST/LUNG: Clear to auscultation bilaterally; No rales, rhonchi, wheezing, or rubs. Intermittent crackles with mucus. Unlabored respirations  HEART: Irregular rate and rhythm; No murmurs, rubs, or gallops  ABDOMEN: Bowel sounds present; Soft, Nontender, Nondistended. No hepatomegaly  EXTREMITIES:  LLE cold cool w/o any palpable pulses,   NERVOUS SYSTEM:  Alert & Oriented X0, speech clear. Limited by mental status   SKIN: mottled with significant skin changes LLE PHYSICAL EXAM:  Vital Signs Last 24 Hrs  T(C): 36.2 (06-11-22 @ 00:00), Max: 37.9 (06-10-22 @ 18:21)  T(F): 97.2 (06-11-22 @ 00:00), Max: 100.3 (06-10-22 @ 18:21)  HR: 121 (06-11-22 @ 00:00) (96 - 152)  BP: 91/70 (06-11-22 @ 00:00) (91/70 - 147/65)  BP(mean): --  RR: 18 (06-11-22 @ 00:00) (18 - 26)  SpO2: 95% (06-11-22 @ 00:00) (88% - 99%)    GENERAL: NAD, Cachectic, malnourished   HEAD:  Atraumatic, Normocephalic  EYES: EOMI, PERRLA, conjunctiva and sclera clear  ENT: Moist mucous membranes  NECK: Supple, No JVD  CHEST/LUNG: Clear to auscultation bilaterally; No rales, rhonchi, wheezing, or rubs. Intermittent crackles with mucus. Unlabored respirations  HEART: Irregular rate and rhythm; No murmurs, rubs, or gallops  ABDOMEN: Bowel sounds present; Soft, Nontender, Nondistended. No hepatomegaly  EXTREMITIES:  LLE cold cool w/o any palpable pulses,   NERVOUS SYSTEM:  Alert & Oriented X0, speech clear. Limited by mental status   SKIN: mottled with significant skin changes LLE

## 2022-06-12 NOTE — PROGRESS NOTE ADULT - PROBLEM SELECTOR PLAN 3
Mild troponin elevation likely in the setting of demand ischemia.   - Troponin T 64 at admission, now downtrending

## 2022-06-12 NOTE — SWALLOW BEDSIDE ASSESSMENT ADULT - SLP PERTINENT HISTORY OF CURRENT PROBLEM
89 y/o F full code, bedbound with PMH of CVA (old left MCA infarct), AFIB on eliquis/diltiazem/digoxin/metoprolol, HTN, HLD, CAD, and dementia BIBEMS as transfer from [place] for left lower leg ischemia. As per EMS, the family noticed that patient's left lower leg turning colder and blue 2 days ago and decided to call EMS today for further evaluation. Received cardizem 10mg and 4mg morphine prior to arrival. Patient seen by Vascular Surgery and will require amputation of the L leg. Course complicated by a ROBERTO CARLOS and UTI. Talked to son and daughter in law who states that pt would want to be DNR and DNI. They also agreed on no pressors. Family wants everything else done for patient including antibiotics. Discussed with family that prognosis is poor and that medical management will not improve patients condition. Family understands. Will consult palliative care for further GOC discussion with family. 89 y/o F full code, bedbound with PMH of CVA (old left MCA infarct), AFIB on eliquis/diltiazem/digoxin/metoprolol, HTN, HLD, CAD, and dementia BIBEMS as transfer from [place] for left lower leg ischemia. As per EMS, the family noticed that patient's left lower leg turning colder and blue 2 days ago and decided to call EMS today for further evaluation. Received cardizem 10mg and 4mg morphine prior to arrival. Patient seen by Vascular Surgery and will require amputation of the L leg. Course complicated by a ROBERTO CARLOS and UTI. Talked to son and daughter in law who states that pt would want to be DNR/DNI, no pressors. Family wants everything else done for patient including antibiotics. Prognosis is poor. Medical management will not improve patients condition. Will consult palliative care for further GOC discussion with family.

## 2022-06-12 NOTE — SWALLOW BEDSIDE ASSESSMENT ADULT - SLP GENERAL OBSERVATIONS
Pt was received at bedside sleeping. +NC (3L). Roused with verbal and tactile stimuli. She was AAOx0 and unable to follow any commands (dementia; per EMR- baseline mental status). Perseverating on "I love you, thank you, help me." No other coherent utterances. Vocal quality deemed WFL for age and gender.

## 2022-06-12 NOTE — GOALS OF CARE CONVERSATION - ADVANCED CARE PLANNING - CONVERSATION DETAILS
Ms. Arriola is a 89/F w/ dementia, bedbound, who has a hx of MCA infarct, AF, HTN, HLD, CAD and who was transferred from [facility] for left lower leg ischemia.  Pt seen by surgery.  The limb is not salvageable and will need amputation however the operation carries a high risk in this pt and in earlier discussion family opted for conservative management.  Further goals of care between son Denis, dtr-in-law Michelle, and me revealed that the family wants to focus on making the patient comfortable.  They know that although she is a fighter she has likely reached the end of her life and has implored me to "let her die in peace".  To that end, in shared decision-making, we will stop the heparin gtt, stop antibiotics, stop blood draws, and add medications to make the patient comfortable.  She was already DNR/DNI.  Denis understands that his mother will continue to fight, but may decide to pass away naturally.  Family's questions were answered, concerns addressed.  Primary team available if pt or family needs us.

## 2022-06-12 NOTE — PROGRESS NOTE ADULT - PROBLEM SELECTOR PLAN 5
Hx of afib. Unable for further hx. Chart review Eliquis.   - Hold Eliquis and will discuss surgery plan with Vascular team  - C/w heparin drip  - Pt unable to take PO. Will start patient on lopressor 5mg Q6  - Hold diltiazem given pressure   - Will start home digoxin if Afib not controlled   - Digoxin level in am pending

## 2022-06-12 NOTE — PROGRESS NOTE ADULT - PROBLEM SELECTOR PLAN 8
DVT/PE ppx: Heparin drip   Diet: NPO pending S&S eval   Code: DNR/DNI. No pressors. Family still wants everything else done including antibiotics   Family called back. Updated number is [phone] (Michelle Zhou- daughter in law)

## 2022-06-12 NOTE — SWALLOW BEDSIDE ASSESSMENT ADULT - COMMENTS
IMAGING:  CXR: 6/10/22  IMPRESSION:  Pulmonary edema.    Pt is known to the Speech Pathology service from bedside swallow evaluation 1/14/22 at [facility]. "Oropharyngeal phases of swallow marked by reduced labial seal /stripping utensil, increased oral transit times with suspected delay in swallow, and decreased hyolaryngeal elevation/excursion to palpation. no overt signs of aspiration." Rx at that time: "puree with mildly thick liquid."

## 2022-06-12 NOTE — SWALLOW BEDSIDE ASSESSMENT ADULT - SWALLOW EVAL: RECOMMENDED FEEDING/EATING TECHNIQUES
crush medication (when feasible)/no straws/oral hygiene/position upright (90 degrees)/small sips/bites

## 2022-06-12 NOTE — PROGRESS NOTE ADULT - PROBLEM SELECTOR PLAN 4
Most likely 2/2 to pre-renal etiology. Cr: BUN ratio 1:42 consistent with pre-renal and was fluid responsive.   - s/p 1.5L   - now downtrending. will cont to monitor

## 2022-06-12 NOTE — PROGRESS NOTE ADULT - ASSESSMENT
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 Patient seen by vascular surgery and will require ambutation of the L leg. Course complicated by a ROBERTO CARLOS and UTI.

## 2022-06-12 NOTE — PROGRESS NOTE ADULT - PROBLEM SELECTOR PLAN 1
Acute ischemic limb likely 2/2 to thromboembolic event.   - 06/10: Vascular Surgery consult, determined to have    Plan:   - Vascular recommendations appreciated   - C/w Heparin infusion   - Off load pressure points on L foot   - Possible L AKA pending clinical course and discussion with family  - Maintain active type and screen  - Pain control with IV tylenol and IV dilaudid

## 2022-06-12 NOTE — SWALLOW BEDSIDE ASSESSMENT ADULT - SWALLOW EVAL: DIAGNOSIS
89 y/o bedbound F with PMH of CVA (old left MCA infarct), Afib, HTN, HLD, CAD, and dementia BIBEMS as transfer from [place] for left lower leg ischemia, seen by vascular surgery and will require amputation of the L leg, course c/b ROBERTO CARLOS and UTI. Pt was seen today for bedside swallow evaluation which revealed oral dysphagia and suspected pharyngeal dysphagia. The swallow sequence was characterized by mildly reduced AP bolus transfer, suspected delayed trigger of pharyngeal swallow, and overt clinical s/s of aspiration or penetration (throat clearing post intake of mildly thick liquids). No overt clinical s/s of aspiration or penetration with moderately thick liquids or puree. Chewables not trialed given edentulous status.

## 2022-06-12 NOTE — PROGRESS NOTE ADULT - SUBJECTIVE AND OBJECTIVE BOX
PROGRESS NOTE:   Authored by Nahomy Garcia MD  Pager: [facility] [phone]; [facility] [phone]    Patient is a 89y old  Female who presents with a chief complaint of Ischemic limb (2022 07:29)      SUBJECTIVE / OVERNIGHT EVENTS:  Patient unable to verbalize complains     ADDITIONAL REVIEW OF SYSTEMS:    MEDICATIONS  (STANDING):  atorvastatin 40 milliGRAM(s) Oral at bedtime  cefepime   IVPB 1000 milliGRAM(s) IV Intermittent every 12 hours  cyanocobalamin 1000 MICROGram(s) Oral daily  escitalopram 5 milliGRAM(s) Oral daily  folic acid 1 milliGRAM(s) Oral daily  heparin  Infusion.  Unit(s)/Hr (11 mL/Hr) IV Continuous <Continuous>  influenza  Vaccine (HIGH DOSE) 0.7 milliLiter(s) IntraMuscular once  metoprolol tartrate Injectable 5 milliGRAM(s) IV Push every 6 hours  multivitamin 1 Tablet(s) Oral daily  thiamine IVPB 500 milliGRAM(s) IV Intermittent every 8 hours  vancomycin  IVPB 750 milliGRAM(s) IV Intermittent every 24 hours  vancomycin  IVPB        MEDICATIONS  (PRN):  heparin   Injectable 4500 Unit(s) IV Push every 6 hours PRN For aPTT less than 40  heparin   Injectable 2000 Unit(s) IV Push every 6 hours PRN For aPTT between 40 - 57  HYDROmorphone  Injectable 0.2 milliGRAM(s) IV Push every 6 hours PRN Severe Pain (7 - 10)  polyethylene glycol 3350 17 Gram(s) Oral daily PRN Constipation      CAPILLARY BLOOD GLUCOSE        I&O's Summary    2022 07:01  -  2022 07:00  --------------------------------------------------------  IN: 0 mL / OUT: 850 mL / NET: -850 mL        PHYSICAL EXAM:  Vital Signs Last 24 Hrs  T(C): 36.6 (2022 04:29), Max: 36.6 (2022 21:10)  T(F): 97.8 (2022 04:29), Max: 97.8 (2022 21:10)  HR: 117 (2022 04:29) (60 - 135)  BP: 109/80 (2022 04:29) (109/68 - 124/83)  BP(mean): --  RR: 18 (2022 04:29) (18 - 18)  SpO2: 95% (2022 04:29) (93% - 97%)    GENERAL: NAD, Cachectic, malnourished   HEAD:  Atraumatic, Normocephalic  EYES: EOMI, PERRLA, conjunctiva and sclera clear  ENT: Moist mucous membranes  NECK: Supple, No JVD  CHEST/LUNG: Clear to auscultation bilaterally; No rales, rhonchi, wheezing, or rubs. Intermittent crackles with mucus. Unlabored respirations  HEART: Irregular rate and rhythm; No murmurs, rubs, or gallops  ABDOMEN: Bowel sounds present; Soft, Nontender, Nondistended. No hepatomegaly  EXTREMITIES:  LLE cold cool w/o any palpable pulses,   NERVOUS SYSTEM:  Alert & Oriented X0, speech clear. Limited by mental status   SKIN: mottled with significant skin changes LLE    LABS:                        11.2   17.08 )-----------( 100      ( 2022 12:35 )             36.8     06-11    145  |  111<H>  |  49<H>  ----------------------------<  92  5.0   |  23  |  0.99    Ca    8.5      2022 12:38  Phos  3.0     06-11  Mg     2.3     06-11    TPro  6.0  /  Alb  2.4<L>  /  TBili  0.6  /  DBili  x   /  AST  48<H>  /  ALT  35  /  AlkPhos  82  06-11    PT/INR - ( 10 Tobi 2022 19:37 )   PT: 15.1 sec;   INR: 1.31 ratio         PTT - ( 2022 02:02 )  PTT:68.0 sec  CARDIAC MARKERS ( 2022 12:38 )  x     / x     / x     / x     / 10.3 ng/mL  CARDIAC MARKERS ( 2022 04:55 )  x     / x     / x     / x     / 16.2 ng/mL      Urinalysis Basic - ( 10 Tobi 2022 20:54 )    Color: Yellow / Appearance: Slightly Turbid / S.053 / pH: x  Gluc: x / Ketone: Negative  / Bili: Negative / Urobili: Negative   Blood: x / Protein: 100 / Nitrite: Positive   Leuk Esterase: Large / RBC: 3 /hpf /  /HPF   Sq Epi: x / Non Sq Epi: 1 /hpf / Bacteria: Many        Culture - Urine (collected 10 Tobi 2022 20:54)  Source: Clean Catch Clean Catch (Midstream)  Preliminary Report (2022 08:12):    >100,000 CFU/ml Escherichia coli    Culture - Blood (collected 10 Tobi 2022 18:35)  Source: .Blood Blood  Preliminary Report (2022 01:02):    No growth to date.    Culture - Blood (collected 10 Tobi 2022 18:20)  Source: .Blood Blood  Preliminary Report (2022 01:02):    No growth to date.        RADIOLOGY & ADDITIONAL TESTS:  Results Reviewed:   Imaging Personally Reviewed:  Electrocardiogram Personally Reviewed:    COORDINATION OF CARE:  Care Discussed with Consultants/Other Providers [Y/N]:  Prior or Outpatient Records Reviewed [Y/N]:

## 2022-06-13 NOTE — PROGRESS NOTE ADULT - PROBLEM SELECTOR PLAN 1
Acute ischemic limb likely 2/2 to thromboembolic event.   - 06/10: Vascular Surgery consulted, pt not surgical candidate   - Pt started on Heparin infusion, now d/c-ed after family discussion   - Off load pressure points on L foot   - Maintain active type and screen  - Pain control with IV tylenol and IV dilaudid Acute ischemic limb likely 2/2 to thromboembolic event.   - Vascular Surgery consulted, pt not surgical candidate   - Pt started on Heparin infusion, now d/c-ed after family discussion   - Off load pressure points on L foot   - Maintain active type and screen  - Pain control with IV tylenol and IV dilaudid

## 2022-06-13 NOTE — PROGRESS NOTE ADULT - PROBLEM SELECTOR PLAN 5
Hx of afib. Unable for further hx. Chart review Eliquis.   - Hold Eliquis and will discuss surgery plan with Vascular team  - Pt unable to take PO. Will start patient on lopressor 5mg Q6  - Hold diltiazem given pressure

## 2022-06-13 NOTE — PROGRESS NOTE ADULT - SUBJECTIVE AND OBJECTIVE BOX
PROGRESS NOTE:   Authored by Nahomy Garcia MD  Pager: [facility] [phone]; [facility] [phone]    Patient is a 89y old  Female who presents with a chief complaint of Ischemic limb (12 Jun 2022 08:55)      SUBJECTIVE / OVERNIGHT EVENTS:  Patient unchanged from yesterday. Unable to verbalize any complains     ADDITIONAL REVIEW OF SYSTEMS:    MEDICATIONS  (STANDING):    MEDICATIONS  (PRN):  glycopyrrolate Injectable 0.4 milliGRAM(s) IV Push four times a day PRN Secretions  HYDROmorphone  Injectable 0.2 milliGRAM(s) IV Push every 2 hours PRN Moderate pain (4-6), Severe pain (7-10), Respiratory rate greater than 22  HYDROmorphone  Injectable 0.5 milliGRAM(s) IV Push every 2 hours PRN Severe Pain (7 - 10)  LORazepam   Injectable 0.5 milliGRAM(s) IV Push every 2 hours PRN Anxiety      CAPILLARY BLOOD GLUCOSE        I&O's Summary    12 Jun 2022 07:01  -  13 Jun 2022 07:00  --------------------------------------------------------  IN: 640 mL / OUT: 350 mL / NET: 290 mL        PHYSICAL EXAM:  Vital Signs Last 24 Hrs  T(C): 36.5 (13 Jun 2022 04:28), Max: 36.6 (12 Jun 2022 21:38)  T(F): 97.7 (13 Jun 2022 04:28), Max: 97.8 (12 Jun 2022 21:38)  HR: 130 (13 Jun 2022 04:28) (110 - 130)  BP: 127/84 (13 Jun 2022 04:28) (106/83 - 128/88)  BP(mean): --  RR: 18 (13 Jun 2022 04:28) (18 - 18)  SpO2: 96% (13 Jun 2022 04:28) (94% - 96%)    GENERAL: NAD, Cachectic, malnourished   HEAD:  Atraumatic, Normocephalic  EYES: EOMI, PERRLA, conjunctiva and sclera clear  ENT: Moist mucous membranes  NECK: Supple, No JVD  CHEST/LUNG: Clear to auscultation bilaterally; No rales, rhonchi, wheezing, or rubs. Intermittent crackles with mucus. Unlabored respirations  HEART: Irregular rate and rhythm; No murmurs, rubs, or gallops  ABDOMEN: Bowel sounds present; Soft, Nontender, Nondistended. No hepatomegaly  EXTREMITIES:  LLE cold cool w/o any palpable pulses,   NERVOUS SYSTEM:  Alert & Oriented X0, speech clear. Limited by mental status   SKIN: mottled with significant skin changes LLE    LABS:                        12.1   19.29 )-----------( 114      ( 12 Jun 2022 11:11 )             40.3     06-12    145  |  112<H>  |  42<H>  ----------------------------<  129<H>  4.7   |  22  |  1.09    Ca    8.7      12 Jun 2022 11:11  Phos  3.5     06-12  Mg     2.3     06-12    TPro  6.0  /  Alb  2.4<L>  /  TBili  0.6  /  DBili  x   /  AST  48<H>  /  ALT  35  /  AlkPhos  82  06-11    PTT - ( 12 Jun 2022 11:11 )  PTT:64.7 sec  CARDIAC MARKERS ( 11 Jun 2022 12:38 )  x     / x     / x     / x     / 10.3 ng/mL          Culture - Urine (collected 10 Tobi 2022 20:54)  Source: Clean Catch Clean Catch (Midstream)  Preliminary Report (12 Jun 2022 08:12):    >100,000 CFU/ml Escherichia coli    Culture - Blood (collected 10 Tobi 2022 18:35)  Source: .Blood Blood  Preliminary Report (12 Jun 2022 01:02):    No growth to date.    Culture - Blood (collected 10 Tobi 2022 18:20)  Source: .Blood Blood  Preliminary Report (12 Jun 2022 01:02):    No growth to date.        RADIOLOGY & ADDITIONAL TESTS:  Results Reviewed:   Imaging Personally Reviewed:  Electrocardiogram Personally Reviewed:    COORDINATION OF CARE:  Care Discussed with Consultants/Other Providers [Y/N]:  Prior or Outpatient Records Reviewed [Y/N]:

## 2022-06-13 NOTE — PROGRESS NOTE ADULT - PROBLEM SELECTOR PLAN 8
DVT/PE ppx: none    Diet: Pureed diet with mildly thick liquid   Code: DNR/DNI. Comfort care. Updated number is [phone] (Michelle Zhou- daughter in law)

## 2022-06-13 NOTE — CONSULT NOTE ADULT - SUBJECTIVE AND OBJECTIVE BOX
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 As per EMS, the family noticed that patient's left lower leg turning colder and blue 2 days ago and decided to call EMS today for further evaluation.  In the ED:   Vitals: , /110, Temp 100.3  Labs: WBC 21.6, Platelets 104, Na 148, Cr 1.41, Troponin I 429.3  Interventions: heparin drip started, Vascular consulted  (11 Jun 2022 00:48)    PERTINENT PM/SXH:   CVA (cerebral vascular accident)    A-fib    Hypertension      No significant past surgical history      FAMILY HISTORY:  No pertinent family of CAD history in first degree relatives      ITEMS NOT CHECKED ARE NOT PRESENT    SOCIAL HISTORY:   Significant other/partner[x ]  Children[ ]  Mandaen/Spirituality:  Substance hx:  [ ]   Tobacco hx:  [ ]   Alcohol hx: [ ]   Home Opioid hx:  [ ] I-Stop Reference No:  Living Situation: [x ]Home  [ ]Long term care  [ ]Rehab [ ]Other    ADVANCE DIRECTIVES:    DNR    MOLST  [x ]    Living Will  [ ]     DECISION MAKER(s):  [ ] Health Care Proxy(s)  [ x] Surrogate(s)  [ ] Guardian           Name(s): Phone Number(s): Michelle [phone]    BASELINE (I)ADL(s) (prior to admission):  [place]: [ ]Total  [ ] Moderate [x ]Dependent    Allergies    No Known Allergies    Intolerances    MEDICATIONS  (STANDING):  HYDROmorphone  Injectable 0.2 milliGRAM(s) IV Push every 6 hours    MEDICATIONS  (PRN):  glycopyrrolate Injectable 0.4 milliGRAM(s) IV Push four times a day PRN Secretions  HYDROmorphone  Injectable 0.2 milliGRAM(s) IV Push every 2 hours PRN Moderate Pain (4 - 6)  HYDROmorphone  Injectable 0.5 milliGRAM(s) IV Push every 2 hours PRN Severe Pain (7 - 10)  LORazepam   Injectable 0.5 milliGRAM(s) IV Push every 2 hours PRN Anxiety    PRESENT SYMPTOMS: [ ]Unable to obtain due to poor mentation   Source if other than patient:  [ ]Family   [ ]Team     Pain: [x ]yes [ ]no  QOL impact -   Location -    LLE                Aggravating factors - movement  Quality - unable  Radiation - unable  Timing- unable  Severity (0-10 scale): unable  Minimal acceptable level (0-10 scale):  unable    PAIN AD Score: 2    http://geriatrictoolkit.[email or web address]/cog/painad.pdf (press ctrl +  left click to view)    Dyspnea:                           [ ]Mild [ ]Moderate [ ]Severe  Anxiety:                             [ ]Mild [ ]Moderate [ ]Severe  Fatigue:                             [ ]Mild [ ]Moderate [x ]Severe  Nausea:                             [ ]Mild [ ]Moderate [ ]Severe  Loss of appetite:              [ ]Mild [ ]Moderate [ x]Severe  Constipation:                    [ ]Mild [ ]Moderate [ ]Severe    Other Symptoms:  [ ]All other review of systems negative     Palliative Performance Status Version 2:     20    %    http://npcrc.org/files/news/palliative_performance_scale_ppsv2.pdf  PHYSICAL EXAM:  Vital Signs Last 24 Hrs  T(C): 36.5 (13 Jun 2022 04:28), Max: 36.6 (12 Jun 2022 21:38)  T(F): 97.7 (13 Jun 2022 04:28), Max: 97.8 (12 Jun 2022 21:38)  HR: 130 (13 Jun 2022 04:28) (110 - 130)  BP: 127/84 (13 Jun 2022 04:28) (106/83 - 127/84)  BP(mean): --  RR: 18 (13 Jun 2022 04:28) (18 - 18)  SpO2: 96% (13 Jun 2022 04:28) (94% - 96%) I&O's Summary    12 Jun 2022 07:01  -  13 Jun 2022 07:00  --------------------------------------------------------  IN: 640 mL / OUT: 350 mL / NET: 290 mL    13 Jun 2022 07:01  -  13 Jun 2022 13:31  --------------------------------------------------------  IN: 120 mL / OUT: 0 mL / NET: 120 mL      GENERAL:  [ ]Alert  [ ]Oriented x   [x ]Lethargic  [x ]Cachexia  [ ]Unarousable  [ ]Verbal  [ x]Non-Verbal    Behavioral:   [ ] Anxiety  [ x] Delirium [ ] Agitation [ ] Other    HEENT:  [ ]Normal   [ x]Dry mouth   [ ]ET Tube/Trach  [ ]Oral lesions    PULMONARY:   [ ]Clear [ ]Tachypnea  [ ]Audible excessive secretions   [ ]Rhonchi        [ ]Right [ ]Left [ ]Bilateral  [ ]Crackles        [ ]Right [ ]Left [ ]Bilateral  [ ]Wheezing     [ ]Right [ ]Left [ ]Bilateral  [x ]Diminished breath sounds [ ]right [ ]left [ x]bilateral    CARDIOVASCULAR:    [ x]Regular [ ]Irregular [x ]Tachy  [ ]Jefry [ ]Murmur [ ]Other    GASTROINTESTINAL:  [x]Soft  [ ]Distended   [x ]+BS  x[ ]Non tender [ ]Tender  [ ]PEG [ ]OGT/ NGT  Last BM:     GENITOURINARY:  [ ]Normal [x ] Incontinent   [ ]Oliguria/Anuria   [ ]Bradley    [ ]Esternal cath    MUSCULOSKELETAL:   [ ]Normal   [ ]Weakness  [x ]Bed/Wheelchair bound [ ]Edema    NEUROLOGIC:   [ ]No focal deficits  [x ]Cognitive impairment  [ ]Dysphagia [ ]Dysarthria [ ]Paresis [ ]Other     SKIN: LLE wound  [ ]Normal    [ ]Rash  [ ]Pressure ulcer(s)       Present on admission [ ]y [ ]n    CRITICAL CARE:  [ ]Shock Present  [ ]Septic [ ]Cardiogenic [ ]Neurologic [ ]Hypovolemic  [ ]  Vasopressors [ ]  Inotropes   [ ]Respiratory failure present [ ]Mechanical ventilation [ ]Non-invasive ventilatory support [ ]High flow  [ ]Acute  [ ]Chronic [ ]Hypoxic  [ ]Hypercarbic [ ]Other  [x ]Other organ failure  skin    LABS:                        12.1   19.29 )-----------( 114      ( 12 Jun 2022 11:11 )             40.3   06-12    145  |  112<H>  |  42<H>  ----------------------------<  129<H>  4.7   |  22  |  1.09    Ca    8.7      12 Jun 2022 11:11  Phos  3.5     06-12  Mg     2.3     06-12    PTT - ( 12 Jun 2022 11:11 )  PTT:64.7 sec      RADIOLOGY & ADDITIONAL STUDIES:    < from: MR Head No Cont (01.18.22 @ 10:37) >  ACC: [id number] EXAM:  MR BRAIN                          PROCEDURE DATE:  01/18/2022          INTERPRETATION:  CLINICAL INDICATION: Right arm weakness. Stroke.    TECHNIQUE: Multi-planar multi-sequential MR imaging of the brain was   performed without intravenous contrast.    COMPARISON: CT head 1/14/2022.    FINDINGS:  There is restricted diffusion involving the left precentral gyrus at the   hand knob as well as within the lateral left post central gyrus.    Chronic infarct is noted involving the left lateral parietotemporal lobe.   Chronic lacunar infarct in the left cerebellum.    No acute intracranial hemorrhage. Confluent white matter FLAIR   hyperintense foci are noted compatible with chronic small vessel disease.    No hydrocephalus. No extra-axial fluid collections. The skull base flow   voids are present.    The visualized intraorbital contents are normal. The imaged portions of   the paranasal sinuses are clear. The mastoid air cells are clear. The   visualized osseous structures, soft tissues and partially visualized   parotid glands appear normal.    IMPRESSION:    -Acute infarcts of the left pre and postcentral gyri.  -Chronic infarcts as described above.  -Extensive chronic small vessel disease.    --- End of Report ---            SIHVA CARRINGTON MD; Attending Radiologist    < end of copied text >      < from: CT Angio Abd Aorta w/run-off w/ IV Cont (06.10.22 @ 19:12) >  ACC: [id number] EXAM:  CT ANGIO ABD AOR W RUN(W)AW IC                          PROCEDURE DATE:  06/10/2022          INTERPRETATION:  CLINICAL INFORMATION: Peripheral artery disease of the   legs with Left lower extremity cold and mottled.    COMPARISON: None.    CONTRAST/COMPLICATIONS:  IV Contrast: Omnipaque 350  115 cc administered   0 cc discarded  Oral Contrast: NONE  Complications: None reported at time of study completion    CT ANGIOGRAM ABDOMEN, PELVIS, AND LOWER EXTREMITIES:    PROCEDURE:  Initially, nonenhanced CT was obtained through the calves. Then,   following the rapid administration of intravenous contrast, CT   angiography was performed through the abdomen, pelvis, and lower   extremities down to the toes.  Delayed images through the calves were   also obtained. Sagittal and coronal reformats as well as 3D   reconstructions were performed.    FINDINGS:    CENTRAL ARTERIAL SYSTEM:  Significant calcified and noncalcified plaque involving the abdominal   aorta. The celiac artery, SMA, ANNIE, and bilateral renal arteries are   patent.      RIGHT LOWER EXTREMITY:  The common iliac, external iliac, internal iliac and common femoral   arteries are patent on the arterial phase.    On the delayed images, the femoral artery and popliteal artery are   opacified with contrast and patent. Evaluation distal to the TP trunk is   suboptimal secondary to the phase of contrast.      LEFT LOWER EXTREMITY:  The common iliac, external iliac, internal iliac and common femoral   arteries are patent on the arterial phase.    On the delayed images, there is nonopacification distal to the lower   segment of the left femoral artery (6:24). These findings may be   secondary to occlusion versus phase of contrast.    ADDITIONAL FINDINGS: Right hip ORIF.    IMPRESSION:  Suboptimal exam secondary to the phase of contrast.    Nonopacification distal to the lower segment of the left femoral artery.   These findings may be secondary to occlusion versus phase of contrast.   Favor occlusive disease. Arterial Doppler ultrasound correlation may be   helpful.    --- End of Report ---          JERMAINE RED MD; Resident Radiology  This document has been electronically signed.  SILKE GUZMAN MD; Attending Radiologist  This document has been electronically signed. Tobi 10 2022  8:17PM    < end of copied text >      PROTEIN CALORIE MALNUTRITION PRESENT: [ ]mild [ ]moderate [ ]severe [ ]underweight [ ]morbid obesity  https://www.andeal.org/vault/2440/web/files/ONC/Table_Clinical%20Characteristics%20to%20Document%20Malnutrition-White%20JV%20et%20al%202012.pdf    Height (cm): 160 (06-10-22 @ 18:10), 160 (06-10-22 @ 16:36), 160 (01-15-22 @ 13:58)  Weight (kg): 58.3 (06-10-22 @ 18:21), 52.2 (06-10-22 @ 16:36), 56.2 (01-14-22 @ 01:16)  BMI (kg/m2): 22.8 (06-10-22 @ 18:21), 20.4 (06-10-22 @ 18:10), 20.4 (06-10-22 @ 16:36)    [ ]PPSV2 < or = to 30% [ ]significant weight loss  [ ]poor nutritional intake  [ ]anasarca      [ ]Artificial Nutrition      REFERRALS:   [ ]Chaplaincy  [ ]Hospice  [ ]Child Life  [ ]Social Work  [ ]Case management [ ]Holistic Therapy     Goals of Care Document: 
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  Patient's mother in law and son stated they started to notice that patient's left lower leg turning colder and blue 2 days ago and decided to call EMS today for further evaluation. Patient started on heparin gtt and transferred to [facility] for further evaluation.     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  Unit(s)/Hr (11 mL/Hr) IV Continuous <Continuous>    MEDICATIONS  (PRN):  heparin   Injectable 4500 Unit(s) IV Push every 6 hours PRN For aPTT less than 40  heparin   Injectable 2000 Unit(s) IV Push every 6 hours PRN For aPTT between 40 - 57      Allergies    No Known Allergies    Intolerances        SOCIAL HISTORY: Unable to obtain secondary to patient mental status    FAMILY HISTORY: Unable to obtain secondary to patient mental status        Physical Exam:  General: NAD, resting comfortably  HEENT: NC/AT, EOMI, normal hearing, no oral lesions, no LAD, neck supple  Pulmonary: normal resp effort, CTA-B  Cardiovascular: NSR, no murmurs  Abdominal: soft, ND/NT, no organomegaly  Extremities: RLE WWP, L foot and calf with mottling throughout  Neuro: A/O x 0  Vasc: Bilateral femoral pulses. RLE with palpable DP, PT pulses. LLE with NO dopplerable DP/PT/popliteal signals.     Vital Signs Last 24 Hrs  T(C): 36.2 (2022 00:00), Max: 37.9 (10 Tobi 2022 18:21)  T(F): 97.2 (2022 00:00), Max: 100.3 (10 Tobi 2022 18:21)  HR: 121 (2022 00:00) (96 - 152)  BP: 91/70 (2022 00:00) (91/70 - 147/65)  BP(mean): --  RR: 18 (2022 00:00) (18 - 26)  SpO2: 95% (2022 00:00) (88% - 99%)    I&O's Summary          LABS:                        13.3   22.12 )-----------( 106      ( 10 Tobi 2022 19:37 )             44.1     06-10    149<H>  |  114<H>  |  58<H>  ----------------------------<  102<H>  5.0   |  22  |  1.13    Ca    8.8      10 Tobi 2022 19:37    TPro  7.1  /  Alb  2.7<L>  /  TBili  0.8  /  DBili  x   /  AST  71<H>  /  ALT  46<H>  /  AlkPhos  84  06-10    PT/INR - ( 10 Tobi 2022 19:37 )   PT: 15.1 sec;   INR: 1.31 ratio         PTT - ( 10 Tobi 2022 19:37 )  PTT:173.2 sec  Urinalysis Basic - ( 10 Tobi 2022 20:54 )    Color: Yellow / Appearance: Slightly Turbid / S.053 / pH: x  Gluc: x / Ketone: Negative  / Bili: Negative / Urobili: Negative   Blood: x / Protein: 100 / Nitrite: Positive   Leuk Esterase: Large / RBC: 3 /hpf /  /HPF   Sq Epi: x / Non Sq Epi: 1 /hpf / Bacteria: Many      CAPILLARY BLOOD GLUCOSE        LIVER FUNCTIONS - ( 10 Tobi 2022 19:37 )  Alb: 2.7 g/dL / Pro: 7.1 g/dL / ALK PHOS: 84 U/L / ALT: 46 U/L / AST: 71 U/L / GGT: x             RADIOLOGY & ADDITIONAL STUDIES:  < from: CT Angio Abd Aorta w/run-off w/ IV Cont (06.10.22 @ 19:12) >  FINDINGS:    CENTRAL ARTERIAL SYSTEM:  Significant calcified and noncalcified plaque involving the abdominal   aorta. The celiac artery, SMA, ANNIE, and bilateral renal arteries are   patent.      RIGHT LOWER EXTREMITY:  The common iliac, external iliac, internal iliac and common femoral   arteries are patent on the arterial phase.    On the delayed images, the femoral artery and popliteal artery are   opacified with contrast and patent. Evaluation distal to the TP trunk is   suboptimal secondary to the phase of contrast.      LEFT LOWER EXTREMITY:  The common iliac, external iliac, internal iliac and common femoral   arteries are patent on the arterial phase.    On the delayed images, there is nonopacification distal to the lower   segment of the left femoral artery (6:24). These findings may be   secondary to occlusion versus phase of contrast.    ADDITIONAL FINDINGS: Right hip ORIF.    IMPRESSION:  Suboptimal exam secondary to the phase of contrast.    Nonopacification distal to the lower segment of the left femoral artery.   These findings may be secondary to occlusion versus phase of contrast.   Favor occlusive disease. Arterial Doppler ultrasound correlation may be   helpful.    < end of copied text >

## 2022-06-13 NOTE — CONSULT NOTE ADULT - ASSESSMENT
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 Patient seen by vascular surgery, not a surgical candidate. Course complicated by a ROBERTO CARLOS and UTI.    Palliative care called for GOC and pain management.

## 2022-06-13 NOTE — CONSULT NOTE ADULT - PROBLEM SELECTOR RECOMMENDATION 5
kps 20-30%  Discussed pain management with team.  Meds ordered to focus on comfort and decrease pain and agitation.  GOC conversation as per primary team .  Will reach out to family to discuss possible transfer to PCU for continued care and symptom  management.

## 2022-06-13 NOTE — PROGRESS NOTE ADULT - PROBLEM SELECTOR PLAN 6
Advanced dementia hx of bed bound and non-verbal per chart.   - Pt currently DNR/DNI and comfort care   - S&S eval recommended pureed diet with mildly thick liquid   - C/w home SSRI if po tolerant

## 2022-06-13 NOTE — CONSULT NOTE ADULT - PROBLEM SELECTOR RECOMMENDATION 4
Use pain ad score   started Dilaudid 0.2mg ivp q6 atc  dilaudid 0.2mg iv q2hrs prn moderate pain  dilaudid 0.5mg iv q2 h prn severe pain

## 2022-06-13 NOTE — PROGRESS NOTE ADULT - ASSESSMENT
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 Patient seen by vascular surgery and will require ambutation of the L leg. Course complicated by a ROBERTO CARLOS and UTI.

## 2022-06-14 NOTE — PROGRESS NOTE ADULT - ASSESSMENT
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 Patient seen by vascular surgery and will require ambutation of the L leg. Course complicated by a ROBERTO CARLOS and UTI.

## 2022-06-14 NOTE — PROGRESS NOTE ADULT - PROBLEM SELECTOR PLAN 4
Pt requrired 1 additional dose of PRN meds  Continue with -   Dilaudid 0.2mg ivp q6 atc  dilaudid 0.2mg iv q2hrs prn moderate pain  dilaudid 0.5mg iv q2 h prn severe pain.

## 2022-06-14 NOTE — PROGRESS NOTE ADULT - PROBLEM SELECTOR PLAN 6
Spoke with Pts dtr in law and son.    Greater then 16 mis spent discussing ACP.  Michelle is able to teach back that her mother in law is very sick and will likely die from the infection in her leg. She is in agreement with transfer to the PCU and focus on comfort.  Reviewed MOLST form. She was happy to hear that the pt is now on ATC medications for pain.    Transfer to PCU when bed available.  sign out provided to team.

## 2022-06-14 NOTE — PROGRESS NOTE ADULT - PROBLEM SELECTOR PLAN 1
Acute ischemic limb likely 2/2 to thromboembolic event.   - Vascular Surgery consulted, pt not surgical candidate   - Pt started on Heparin infusion, now d/c-ed after family discussion   - Off load pressure points on L foot   - Maintain active type and screen  - Pain control with IV tylenol and IV dilaudid  - Palliative care consulted, will f/u recs

## 2022-06-14 NOTE — PROGRESS NOTE ADULT - ASSESSMENT
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 Patient seen by vascular surgery, not a surgical candidate. Course complicated by a ROBERTO CARLOS and UTI.    Palliative care called for GOC and pain management.

## 2022-06-14 NOTE — PROGRESS NOTE ADULT - SUBJECTIVE AND OBJECTIVE BOX
PROGRESS NOTE:   Authored by Nahomy Garcia MD  Pager: [facility] [phone]; [facility] [phone]    Patient is a 89y old  Female who presents with a chief complaint of Ischemic limb (13 Jun 2022 13:31)      SUBJECTIVE / OVERNIGHT EVENTS:  Pt unchanged from yesterday. Still unable to explain any complaints     ADDITIONAL REVIEW OF SYSTEMS:    MEDICATIONS  (STANDING):  HYDROmorphone  Injectable 0.2 milliGRAM(s) IV Push every 6 hours    MEDICATIONS  (PRN):  glycopyrrolate Injectable 0.4 milliGRAM(s) IV Push four times a day PRN Secretions  HYDROmorphone  Injectable 0.2 milliGRAM(s) IV Push every 2 hours PRN Moderate Pain (4 - 6)  HYDROmorphone  Injectable 0.5 milliGRAM(s) IV Push every 2 hours PRN Severe Pain (7 - 10)  LORazepam   Injectable 0.5 milliGRAM(s) IV Push every 2 hours PRN Anxiety      CAPILLARY BLOOD GLUCOSE        I&O's Summary    13 Jun 2022 07:01  -  14 Jun 2022 07:00  --------------------------------------------------------  IN: 360 mL / OUT: 600 mL / NET: -240 mL        PHYSICAL EXAM:  Vital Signs Last 24 Hrs  T(C): 36.4 (14 Jun 2022 05:40), Max: 36.4 (14 Jun 2022 05:40)  T(F): 97.5 (14 Jun 2022 05:40), Max: 97.5 (14 Jun 2022 05:40)  HR: 132 (14 Jun 2022 05:40) (130 - 132)  BP: 121/86 (14 Jun 2022 05:40) (121/86 - 135/89)  BP(mean): --  RR: 19 (14 Jun 2022 05:40) (18 - 19)  SpO2: 96% (14 Jun 2022 05:40) (96% - 96%)    GENERAL: NAD, Cachectic, malnourished   HEAD:  Atraumatic, Normocephalic  EYES: EOMI, PERRLA, conjunctiva and sclera clear  ENT: Moist mucous membranes  NECK: Supple, No JVD  CHEST/LUNG: Clear to auscultation bilaterally; No rales, rhonchi, wheezing, or rubs. Intermittent crackles with mucus. Unlabored respirations  HEART: Irregular rate and rhythm; No murmurs, rubs, or gallops  ABDOMEN: Bowel sounds present; Soft, Nontender, Nondistended. No hepatomegaly  EXTREMITIES:  LLE cold cool w/o any palpable pulses,   NERVOUS SYSTEM:  Alert & Oriented X0, speech clear. Limited by mental status   SKIN: mottled with significant skin changes LLE      LABS:                        12.1   19.29 )-----------( 114      ( 12 Jun 2022 11:11 )             40.3     06-12    145  |  112<H>  |  42<H>  ----------------------------<  129<H>  4.7   |  22  |  1.09    Ca    8.7      12 Jun 2022 11:11  Phos  3.5     06-12  Mg     2.3     06-12      PTT - ( 12 Jun 2022 11:11 )  PTT:64.7 sec            RADIOLOGY & ADDITIONAL TESTS:  Results Reviewed:   Imaging Personally Reviewed:  Electrocardiogram Personally Reviewed:    COORDINATION OF CARE:  Care Discussed with Consultants/Other Providers [Y/N]:  Prior or Outpatient Records Reviewed [Y/N]:

## 2022-06-14 NOTE — PROGRESS NOTE ADULT - SUBJECTIVE AND OBJECTIVE BOX
SUBJECTIVE AND OBJECTIVE:  pt resting comfortably in bed  INTERVAL HPI/OVERNIGHT EVENTS:  no acute event. pt used 1 additional dose of dilaudid prn    DNR on chart: yes  Allergies    No Known Allergies    Intolerances    MEDICATIONS  (STANDING):  HYDROmorphone  Injectable 0.2 milliGRAM(s) IV Push every 6 hours    MEDICATIONS  (PRN):  glycopyrrolate Injectable 0.4 milliGRAM(s) IV Push four times a day PRN Secretions  HYDROmorphone  Injectable 0.2 milliGRAM(s) IV Push every 2 hours PRN Moderate Pain (4 - 6)  HYDROmorphone  Injectable 0.5 milliGRAM(s) IV Push every 2 hours PRN Severe Pain (7 - 10)  LORazepam   Injectable 0.5 milliGRAM(s) IV Push every 2 hours PRN Anxiety      ITEMS UNCHECKED ARE NOT PRESENT    PRESENT SYMPTOMS: [x ]Unable to obtain due to poor mentation   Source if other than patient:  [ ]Family   [ ]Team     Pain:  [ ]yes [ ]no--- pt unable  QOL impact -   Location -                    Aggravating factors -  Quality -  Radiation -  Timing-  Severity (0-10 scale):  Minimal acceptable level (0-10 scale):     Dyspnea:                           [ ]Mild [ ]Moderate [ ]Severe  Anxiety:                             [ ]Mild [ ]Moderate [ ]Severe  Fatigue:                             [ ]Mild [ ]Moderate [x ]Severe  Nausea:                            [ ]Mild [ ]Moderate [ ]Severe  Loss of appetite:               [ ]Mild [ ]Moderate [x ]Severe  Constipation:                    [ ]Mild [ ]Moderate [ ]Severe    PAIN AD Score:	2  http://geriatrictoolkit.[place].[email or web address]/cog/painad.pdf (Ctrl + left click to view)    Other Symptoms:  [ ]All other review of systems negative     Palliative Performance Status Version 2:    10-20     %      http://npcrc.org/files/news/palliative_performance_scale_ppsv2.pdf    PHYSICAL EXAM:  Vital Signs Last 24 Hrs  T(C): 36.4 (14 Jun 2022 05:40), Max: 36.4 (14 Jun 2022 05:40)  T(F): 97.5 (14 Jun 2022 05:40), Max: 97.5 (14 Jun 2022 05:40)  HR: 132 (14 Jun 2022 05:40) (130 - 132)  BP: 121/86 (14 Jun 2022 05:40) (121/86 - 135/89)  BP(mean): --  RR: 19 (14 Jun 2022 05:40) (18 - 19)  SpO2: 96% (14 Jun 2022 05:40) (96% - 96%) I&O's Summary    13 Jun 2022 07:01  -  14 Jun 2022 07:00  --------------------------------------------------------  IN: 360 mL / OUT: 600 mL / NET: -240 mL    14 Jun 2022 07:01  -  14 Jun 2022 11:03  --------------------------------------------------------  IN: 120 mL / OUT: 0 mL / NET: 120 mL       GENERAL:  [ ]Alert  [ ]Oriented x   [x ]Lethargic  [ ]Cachexia  [x ]Unarousable  [ ]Verbal  [x]Non-Verbal    Behavioral:   [ ]Anxiety  [ ]Delirium [ ]Agitation [ ]Other    HEENT:  [ ]Normal   [ x]Dry mouth   [ ]ET Tube/Trach  [ ]Oral lesions    PULMONARY:   [ ]Clear [ ]Tachypnea  [ ]Audible excessive secretions   [ ]Rhonchi        [ ]Right [ ]Left [ ]Bilateral  [ ]Crackles        [ ]Right [ ]Left [ ]Bilateral  [ ]Wheezing     [ ]Right [ ]Left [ ]Bilateral  [x ]Diminished BS [ ] Right [ ]Left [ x]Bilateral    CARDIOVASCULAR:    [x ]Regular [ ]Irregular [x ]Tachy  [ ]Jefry [ ]Murmur [ ]Other    GASTROINTESTINAL:  [x ]Soft  [ ]Distended   [x ]+BS  [ ]Non tender [ ]Tender  [ ]PEG [ ]OGT/ NGT   Last BM:      GENITOURINARY:  [ ]Normal [x ]Incontinent   [ ]Oliguria/Anuria   [ ]Bradley   [ ] External cath    MUSCULOSKELETAL:   [ ]Normal   [ ]Weakness  [x ]Bed/Wheelchair bound [ ]Edema    NEUROLOGIC:   [ ]No focal deficits  [x ] Cognitive impairment  [ ] Dysphagia [ ]Dysarthria [ ] Paresis [ ]Other     SKIN: L LE wound  [ ]Normal  [ ]Rash   [ ]Pressure ulcer(s) [ ]y [ ]n present on admission    CRITICAL CARE:  [ ]Shock Present  [ ]Septic [ ]Cardiogenic [ ]Neurologic [ ]Hypovolemic  [ ]Vasopressors [ ]Inotropes  [ ]Respiratory failure present [ ]Mechanical Ventilation [ ]Non-invasive ventilatory support [ ]High-Flow  [ ]Acute  [ ]Chronic [ ]Hypoxic  [ ]Hypercarbic [ ]Other  [ ]Other organ failure     LABS:                        12.1   19.29 )-----------( 114      ( 12 Jun 2022 11:11 )             40.3   06-12    145  |  112<H>  |  42<H>  ----------------------------<  129<H>  4.7   |  22  |  1.09    Ca    8.7      12 Jun 2022 11:11  Phos  3.5     06-12  Mg     2.3     06-12    PTT - ( 12 Jun 2022 11:11 )  PTT:64.7 sec      RADIOLOGY & ADDITIONAL STUDIES:    < from: CT Angio Abd Aorta w/run-off w/ IV Cont (06.10.22 @ 19:12) >  ACC: [id number] EXAM:  CT ANGIO ABD AOR W RUN(W)AW IC                          PROCEDURE DATE:  06/10/2022          INTERPRETATION:  CLINICAL INFORMATION: Peripheral artery disease of the   legs with Left lower extremity cold and mottled.    COMPARISON: None.    CONTRAST/COMPLICATIONS:  IV Contrast: Omnipaque 350  115 cc administered   0 cc discarded  Oral Contrast: NONE  Complications: None reported at time of study completion    CT ANGIOGRAM ABDOMEN, PELVIS, AND LOWER EXTREMITIES:    PROCEDURE:  Initially, nonenhanced CT was obtained through the calves. Then,   following the rapid administration of intravenous contrast, CT   angiography was performed through the abdomen, pelvis, and lower   extremities down to the toes.  Delayed images through the calves were   also obtained. Sagittal and coronal reformats as well as 3D   reconstructions were performed.    FINDINGS:    CENTRAL ARTERIAL SYSTEM:  Significant calcified and noncalcified plaque involving the abdominal   aorta. The celiac artery, SMA, ANNIE, and bilateral renal arteries are   patent.      RIGHT LOWER EXTREMITY:  The common iliac, external iliac, internal iliac and common femoral   arteries are patent on the arterial phase.    On the delayed images, the femoral artery and popliteal artery are   opacified with contrast and patent. Evaluation distal to the TP trunk is   suboptimal secondary to the phase of contrast.      LEFT LOWER EXTREMITY:  The common iliac, external iliac, internal iliac and common femoral   arteries are patent on the arterial phase.    On the delayed images, there is nonopacification distal to the lower   segment of the left femoral artery (6:24). These findings may be   secondary to occlusion versus phase of contrast.    ADDITIONAL FINDINGS: Right hip ORIF.    IMPRESSION:  Suboptimal exam secondary to the phase of contrast.    Nonopacification distal to the lower segment of the left femoral artery.   These findings may be secondary to occlusion versus phase of contrast.   Favor occlusive disease. Arterial Doppler ultrasound correlation may be   helpful.    --- End of Report ---          JERMAINE RED MD; Resident Radiology  This document has been electronically signed.  SILKE GUZMAN MD; Attending Radiologist  This document has been electronically signed. Tobi 10 2022  8:17PM    < end of copied text >      Protein Calorie Malnutrition Present: [ ]mild [ ]moderate [ ]severe [ ]underweight [ ]morbid obesity  https://www.andeal.org/vault/2440/web/files/ONC/Table_Clinical%20Characteristics%20to%20Document%20Malnutrition-White%20JV%20et%20al%548559.pdf    Height (cm): 160 (06-10-22 @ 18:10), 160 (06-10-22 @ 16:36), 160 (01-15-22 @ 13:58)  Weight (kg): 58.3 (06-10-22 @ 18:21), 52.2 (06-10-22 @ 16:36), 56.2 (01-14-22 @ 01:16)  BMI (kg/m2): 22.8 (06-10-22 @ 18:21), 20.4 (06-10-22 @ 18:10), 20.4 (06-10-22 @ 16:36)    [ ]PPSV2 < or = 30%  [ ]significant weight loss [ ]poor nutritional intake [ ]anasarca    [ ]Artificial Nutrition    REFERRALS:   [ ]Chaplaincy  [ ]Hospice  [ ]Child Life  [ ]Social Work  [ ]Case management [ ]Holistic Therapy     Goals of Care Document:

## 2022-06-14 NOTE — PROGRESS NOTE ADULT - PROBLEM SELECTOR PLAN 8
DVT/PE ppx: none    Diet: Pureed diet with mildly thick liquid   Code: DNR/DNI. Comfort care. Updated number is [phone] (Michelle Zhou- daughter in law)

## 2022-06-15 NOTE — PROGRESS NOTE ADULT - PROBLEM SELECTOR PLAN 5
ROBERTO CARLOS Most likely 2/2 to pre-renal etiology  and was fluid responsive.   - s/p 1.5L with improvement in labs prior to arrival in Palliative care unit  Goal is comfort care.

## 2022-06-15 NOTE — PROGRESS NOTE ADULT - SUBJECTIVE AND OBJECTIVE BOX
GAP TEAM PALLIATIVE CARE UNIT PROGRESS NOTE:      [  ] Patient on hospice program.    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 As per EMS, the family noticed that patient's left lower leg turning colder and blue 2 days ago and decided to call EMS. Patient was seen by vascular sugery and was not a surgical candidate as per vascular. Hospital course was complicated by ROBERTO CARLOS and UTI. Palliative was consulted for GOC and pain management.    OVERNIGHT EVENTS:  Pt is seen and examined at bedside. Patient's PAINAD score was 0 and remains comfortable. This morning on rounds, she repeatedly stated "Outside". After informing her that we cannot transport her outside but able to open the blinds to offer some sunshine, she responded by saying "thank you". In the past 24 Hour period 8AM to 8AM, she received dilaudid 0.2 mg IV x 1 for moderate pain and Dilaudid 0.5mg x1 for severe pain. This morning, her temp is 97.6, RR 18, SpO2 95% on 2L O2 by NC, /94 and . Her last BM was 6/13. She has a davila in place (Day5) and continues to make urine output.       DNR on chart: Yes  Yes      Allergies    No Known Allergies    Intolerances    MEDICATIONS  (STANDING):  HYDROmorphone  Injectable 0.2 milliGRAM(s) IV Push every 6 hours    MEDICATIONS  (PRN):  acetaminophen  Suppository .. 650 milliGRAM(s) Rectal every 6 hours PRN Temp greater or equal to 38C (100.4F), Mild Pain (1 - 3)  bisacodyl Suppository 10 milliGRAM(s) Rectal daily PRN Constipation  glycopyrrolate Injectable 0.4 milliGRAM(s) IV Push four times a day PRN Secretions  HYDROmorphone  Injectable 0.5 milliGRAM(s) IV Push every 1 hour PRN Severe Pain (7 - 10)  HYDROmorphone  Injectable 0.2 milliGRAM(s) IV Push every 1 hour PRN Moderate Pain (4 - 6)  HYDROmorphone  Injectable 0.5 milliGRAM(s) IV Push every 1 hour PRN dyspnea  LORazepam   Injectable 0.5 milliGRAM(s) IV Push every 1 hour PRN Anxiety    ITEMS UNCHECKED ARE NOT PRESENT    PRESENT SYMPTOMS: [x]Unable to self-report  [x]PAINADs [x]RDOS  Source if other than patient:  [ ]Family   [ ]Team     Pain: [ ] yes [ ] no  QOL impact -   Location -                    Aggravating factors -  Quality -  Radiation -  Timing-  Severity (0-10 scale):  Minimal acceptable level (0-10 scale):     PAINAD Score: 0  http://geriatrictoolkit.[facility]/cog/painad.pdf (Ctrl +  left click to view)    Dyspnea:                           [x ]Mild [ ]Moderate [ ]Severe    RDOS: 1  0 to 2  minimal or no respiratory distress   3  mild distress  4 to 6 moderate distress  >7 severe distress  [email or web address]/handouts/hen/Respiratory_Distress_Observation_Scale.pdf (Ctrl +  left click to view)     Anxiety:                             [ ]Mild [ ]Moderate [ ]Severe  Fatigue:                             [ ]Mild [ ]Moderate [ ]Severe  Nausea:                             [ ]Mild [ ]Moderate [ ]Severe  Loss of appetite:              [ ]Mild [ ]Moderate [ ]Severe  Constipation:                    [ ]Mild [ ]Moderate [ ]Severe  		  Other Symptoms:  [ ]All other review of systems negative     Palliative Performance Status Version 2:       30  %         http://npcrc.org/files/news/palliative_performance_scale_ppsv2.pdf  PHYSICAL EXAM:   Vital Signs Last 24 Hrs  T(C): 36.4 (15 Tobi 2022 05:30), Max: 36.4 (15 Tobi 2022 05:30)  T(F): 97.6 (15 Tobi 2022 05:30), Max: 97.6 (15 Tobi 2022 05:30)  HR: 128 (15 Tobi 2022 05:30) (128 - 128)  BP: 128/94 (15 Tobi 2022 05:30) (128/94 - 128/94)  BP(mean): --  RR: 18 (15 Tobi 2022 05:30) (18 - 18)  SpO2: 95% (15 Tobi 2022 05:30) (95% - 95%) I&O's Summary    14 Jun 2022 07:01  -  15 Tobi 2022 07:00  --------------------------------------------------------  IN: 360 mL / OUT: 500 mL / NET: -140 mL    15 Tobi 2022 07:01  -  15 Tobi 2022 12:11  --------------------------------------------------------  IN: 220 mL / OUT: 0 mL / NET: 220 mL      GENERAL: [ ] Cachexia  [ ]Alert  [ ]Oriented x   [x ]Lethargic  [ ]Unarousable  [x ]Verbal  [ ]Non-Verbal  Behavioral:   [ ] Anxiety  [ ] Delirium [ ] Agitation [ ] Other  HEENT:  [x ]Normal   [ ]Dry mouth   [ ]ET Tube/Trach  [ ]Oral lesions  PULMONARY:   [x ]Clear [ ]Tachypnea  [ ]Audible excessive secretions   [ ]Rhonchi        [ ]Right [ ]Left [ ]Bilateral  [ ]Crackles        [ ]Right [ ]Left [ ]Bilateral  [ ]Wheezing     [ ]Right [ ]Left [ ]Bilateral  [ ]Diminished BS [ ]Right [ ]Left [ ]Bilateral    CARDIOVASCULAR:    [ x]Regular [ ]Irregular [x ]Tachy  [ ]Jefry [ ]Murmur [ ]Other  GASTROINTESTINAL:  [x ]Soft  [ ]Distended   [x ]+BS  [x ]Non tender [ ]Tender  [ ]Other [ ]PEG [ ]OGT/ NGT   Last BM:  6/12  GENITOURINARY:  [ ]Normal [ ] Incontinent   [ ]Oliguria/Anuria   [ ]Davila  MUSCULOSKELETAL:   [ ]Normal   [ ]Weakness  [ x]Bed/Wheelchair bound [ ]Edema  NEUROLOGIC:   [ ]No focal deficits  [ ] Cognitive impairment  [ ] Dysphagia [ ]Dysarthria [ ] Paresis [ ]Other   SKIN:   [ ]Normal  [ ]Rash  [ ]Other  [ ]Pressure ulcer(s)  [ ]y [ ]n  Present on admission      CRITICAL CARE:  [ ] Shock Present  [ ]Septic [ ]Cardiogenic [ ]Neurologic [ ]Hypovolemic  [ ]  Vasopressors [ ]  Inotropes   [ ] Respiratory failure present [ ] Mechanical Ventilation [ ] Non-invasive ventilatory support [ ] High-Flow  [ ] Acute  [ ] Chronic [ ] Hypoxic  [ ] Hypercarbic [ ] Other  [ ] Other organ failure     LABS:            RADIOLOGY & ADDITIONAL STUDIES:    PROTEIN CALORIE MALNUTRITION: [ ] mild [ ] moderate [ ] severe  [ ] underweight [ ] morbid obesity    https://www.andeal.org/vault/2440/web/files/ONC/Table_Clinical%20Characteristics%20to%20Document%20Malnutrition-White%20JV%20et%20al%089090.pdf    Height (cm): 160 (06-10-22 @ 18:10), 160 (06-10-22 @ 16:36), 160 (01-15-22 @ 13:58)  Weight (kg): 58.3 (06-10-22 @ 18:21), 52.2 (06-10-22 @ 16:36), 56.2 (01-14-22 @ 01:16)  BMI (kg/m2): 22.8 (06-10-22 @ 18:21), 20.4 (06-10-22 @ 18:10), 20.4 (06-10-22 @ 16:36)    [x ] PPSV2 < or = 30% [ ] significant weight loss [ ] poor nutritional intake [ ] anasarca   Artificial Nutrition [ ]     REFERRALS:   [ ]Chaplaincy  [ ] Hospice  [ ]Child Life  [ ]Social Work  [ ]Case management [ ]Holistic Therapy [ ] Physical Therapy [ ] Dietary   Goals of Care Document: JACKY Lloyd (06-12-22 @ 15:29)  Goals of Care Conversation:   Participants:  · Participants  Family  · Child(mayelin Barrios    Advance Directives:  · Does patient have Advance Directive  No  · Do you want to complete the HCP and name a [facility] Care Agent  No  · Does Patient Have a Surrogate  No  · Does the Patient have a Court Appointed Guardian ([id number])  No  · Caregiver:  no    Conversation Discussion:  · Conversation  Prognosis; MOLST Discussed; Treatment Options  · Conversation Details  Ms. Arriola is a 89/F w/ dementia, bedbound, who has a hx of MCA infarct, AF, HTN, HLD, CAD and who was transferred from [facility] for left lower leg ischemia.  Pt seen by surgery.  The limb is not salvageable and will need amputation however the operation carries a high risk in this pt and in earlier discussion family opted for conservative management.  Further goals of care between son Denis, dtr-in-law Michelle, and me revealed that the family wants to focus on making the patient comfortable.  They know that although she is a fighter she has likely reached the end of her life and has implored me to "let her die in peace".  To that end, in shared decision-making, we will stop the heparin gtt, stop antibiotics, stop blood draws, and add medications to make the patient comfortable.  She was already DNR/DNI.  Denis understands that his mother will continue to fight, but may decide to pass away naturally.  Family's questions were answered, concerns addressed.  Primary team available if pt or family needs us.    What Matters Most To Patient and Family:  · What matters most to patient and family  Comfort, dignity    Personal Advance Directives Treatment Guidelines:   Treatment Guidelines:  · Decision Maker  Surrogate  · Treatment Guidelines  DNR Order; Comfort measures only; No blood draws; No artificial nutrition; No antibiotics    MOLST:  · Completed  11-Jun-2022    Location of Discussion:   Time Spent on Advance Care Planning:  I spent 30 (in minutes) on advance care planning services with the patient.  This time is separate and distinct from any other care management services provided on this date.    Location of Discussion:  · Location of discussion  Face to face      Electronic Signatures:  Chas Lloyd)  (Signed 12-Jun-2022 15:44)  	Authored: Goals of Care Conversation, Personal Advance Directives Treatment Guidelines, Location of Discussion      Last Updated: 12-Jun-2022 15:44 by Chas Lloyd)

## 2022-06-15 NOTE — PROGRESS NOTE ADULT - PROBLEM SELECTOR PLAN 6
Pt is bedbound and minimally/non verbal at baseline per chart notes.   Encephalopathy multifactorial including but not limited to, UTI, hospitalization, infection.  Goal is comfort care.  Monitor for agitation/Delerium. Pt is bedbound and minimally/non verbal at baseline per chart notes.   Encephalopathy multifactorial including but not limited to, UTI, hospitalization, infection.  Goal is comfort care.  Monitor for agitation/Delerium.  Monitor for constipation, urinary retention, pain, hunger, thirst, etc.  Promote sleep wake cycle and reorientation as indicated.  No expectation of meaningful recovery.

## 2022-06-15 NOTE — PROGRESS NOTE ADULT - PROBLEM SELECTOR PLAN 4
H/o Advanced dementia hx of bed bound and non-verbal per chart.   - Pt currently DNR/DNI and comfort care   - S&S eval recommended pureed diet with mildly thick liquid

## 2022-06-15 NOTE — PROGRESS NOTE ADULT - ASSESSMENT
[facility] TEAM PALLIATIVE CARE UNIT PROGRESS NOTE:      [  ] Patient on hospice program.    INDICATION FOR PALLIATIVE CARE UNIT SERVICES/Interval HPI:    OVERNIGHT EVENTS:    DNR on chart: Yes  Yes      Allergies    No Known Allergies    Intolerances    MEDICATIONS  (STANDING):  HYDROmorphone  Injectable 0.2 milliGRAM(s) IV Push every 6 hours    MEDICATIONS  (PRN):  acetaminophen  Suppository .. 650 milliGRAM(s) Rectal every 6 hours PRN Temp greater or equal to 38C (100.4F), Mild Pain (1 - 3)  bisacodyl Suppository 10 milliGRAM(s) Rectal daily PRN Constipation  glycopyrrolate Injectable 0.4 milliGRAM(s) IV Push four times a day PRN Secretions  HYDROmorphone  Injectable 0.5 milliGRAM(s) IV Push every 1 hour PRN Severe Pain (7 - 10)  HYDROmorphone  Injectable 0.2 milliGRAM(s) IV Push every 1 hour PRN Moderate Pain (4 - 6)  HYDROmorphone  Injectable 0.5 milliGRAM(s) IV Push every 1 hour PRN dyspnea  LORazepam   Injectable 0.5 milliGRAM(s) IV Push every 1 hour PRN Anxiety    ITEMS UNCHECKED ARE NOT PRESENT    PRESENT SYMPTOMS: [ ]Unable to self-report  [ ]PAINADs [ ]RDOS  Source if other than patient:  [ ]Family   [ ]Team     Pain: [ ] yes [ ] no  QOL impact -   Location -                    Aggravating factors -  Quality -  Radiation -  Timing-  Severity (0-10 scale):  Minimal acceptable level (0-10 scale):     PAINAD Score:  [email or web address] (Ctrl +  left click to view)    Dyspnea:                           [ ]Mild [ ]Moderate [ ]Severe    RDOS:  0 to 2  minimal or no respiratory distress   3  mild distress  4 to 6 moderate distress  >7 severe distress  [email or web address]/[email or web address]_Distress_Observation_Scale.pdf (Ctrl +  left click to view)     Anxiety:                             [ ]Mild [ ]Moderate [ ]Severe  Fatigue:                             [ ]Mild [ ]Moderate [ ]Severe  Nausea:                             [ ]Mild [ ]Moderate [ ]Severe  Loss of appetite:              [ ]Mild [ ]Moderate [ ]Severe  Constipation:                    [ ]Mild [ ]Moderate [ ]Severe  		  Other Symptoms:  [ ]All other review of systems negative     Palliative Performance Status Version 2:         %         [email or web address]  PHYSICAL EXAM:   Vital Signs Last 24 Hrs  T(C): 36.4 (15 Tobi 2022 05:30), Max: 36.4 (15 Tobi 2022 05:30)  T(F): 97.6 (15 Tobi 2022 05:30), Max: 97.6 (15 Tobi 2022 05:30)  HR: 128 (15 Tobi 2022 05:30) (128 - 128)  BP: 128/94 (15 Tobi 2022 05:30) (128/94 - 128/94)  BP(mean): --  RR: 18 (15 Tobi 2022 05:30) (18 - 18)  SpO2: 95% (15 Tobi 2022 05:30) (95% - 95%) I&O's Summary    14 Jun 2022 07:01  -  15 Tobi 2022 07:00  --------------------------------------------------------  IN: 360 mL / OUT: 500 mL / NET: -140 mL    15 Tobi 2022 07:01  -  15 Tobi 2022 11:01  --------------------------------------------------------  IN: 120 mL / OUT: 0 mL / NET: 120 mL      GENERAL: [ ] Cachexia  [ ]Alert  [ ]Oriented x   [ ]Lethargic  [ ]Unarousable  [ ]Verbal  [ ]Non-Verbal  Behavioral:   [ ] Anxiety  [ ] Delirium [ ] Agitation [ ] Other  HEENT:  [ ]Normal   [ ]Dry mouth   [ ]ET Tube/Trach  [ ]Oral lesions  PULMONARY:   [ ]Clear [ ]Tachypnea  [ ]Audible excessive secretions   [ ]Rhonchi        [ ]Right [ ]Left [ ]Bilateral  [ ]Crackles        [ ]Right [ ]Left [ ]Bilateral  [ ]Wheezing     [ ]Right [ ]Left [ ]Bilateral  [ ]Diminished BS [ ]Right [ ]Left [ ]Bilateral    CARDIOVASCULAR:    [ ]Regular [ ]Irregular [ ]Tachy  [ ]Jefry [ ]Murmur [ ]Other  GASTROINTESTINAL:  [ ]Soft  [ ]Distended   [ ]+BS  [ ]Non tender [ ]Tender  [ ]Other [ ]PEG [ ]OGT/ NGT   Last BM:    GENITOURINARY:  [ ]Normal [ ] Incontinent   [ ]Oliguria/Anuria   [ ]Bradley  MUSCULOSKELETAL:   [ ]Normal   [ ]Weakness  [ ]Bed/Wheelchair bound [ ]Edema  NEUROLOGIC:   [ ]No focal deficits  [ ] Cognitive impairment  [ ] Dysphagia [ ]Dysarthria [ ] Paresis [ ]Other   SKIN:   [ ]Normal  [ ]Rash  [ ]Other  [ ]Pressure ulcer(s)  [ ]y [ ]n  Present on admission      CRITICAL CARE:  [ ] Shock Present  [ ]Septic [ ]Cardiogenic [ ]Neurologic [ ]Hypovolemic  [ ]  Vasopressors [ ]  Inotropes   [ ] Respiratory failure present [ ] Mechanical Ventilation [ ] Non-invasive ventilatory support [ ] High-Flow  [ ] Acute  [ ] Chronic [ ] Hypoxic  [ ] Hypercarbic [ ] Other  [ ] Other organ failure     LABS:            RADIOLOGY & ADDITIONAL STUDIES:    PROTEIN CALORIE MALNUTRITION: [ ] mild [ ] moderate [ ] severe  [ ] underweight [ ] morbid obesity    https://www.andeal.org/vault/2440/web/files/ONC/Table_Clinical%20Characteristics%20to%20Document%20Malnutrition-White%20JV%20et%20al%013014.pdf    Height (cm): 160 (06-10-22 @ 18:10), 160 (06-10-22 @ 16:36), 160 (01-15-22 @ 13:58)  Weight (kg): 58.3 (06-10-22 @ 18:21), 52.2 (06-10-22 @ 16:36), 56.2 (01-14-22 @ 01:16)  BMI (kg/m2): 22.8 (06-10-22 @ 18:21), 20.4 (06-10-22 @ 18:10), 20.4 (06-10-22 @ 16:36)    [ ] PPSV2 < or = 30% [ ] significant weight loss [ ] poor nutritional intake [ ] anasarca   Artificial Nutrition [ ]     REFERRALS:   [ ]Chaplaincy  [ ] Hospice  [ ]Child Life  [ ]Social Work  [ ]Case management [ ]Holistic Therapy [ ] Physical Therapy [ ] Dietary   Goals of Care Document: JACKY Lloyd (06-12-22 @ 15:29)  Goals of Care Conversation:   Participants:  · Participants  Family  · Child(hannah)  Denis    Advance Directives:  · Does patient have Advance Directive  No  · Do you want to complete the HCP and name a [facility] Care Agent  No  · Does Patient Have a Surrogate  No  · Does the Patient have a Court Appointed Guardian ([address])  No  · Caregiver:  no    Conversation Discussion:  · Conversation  Prognosis; MOLST Discussed; Treatment Options  · Conversation Details  Ms. Arriola is a 89/F w/ dementia, bedbound, who has a hx of MCA infarct, AF, HTN, HLD, CAD and who was transferred from [facility] for left lower leg ischemia.  Pt seen by surgery.  The limb is not salvageable and will need amputation however the operation carries a high risk in this pt and in earlier discussion family opted for conservative management.  Further goals of care between son Denis, dtr-in-law Michelle, and me revealed that the family wants to focus on making the patient comfortable.  They know that although she is a fighter she has likely reached the end of her life and has implored me to "let her die in peace".  To that end, in shared decision-making, we will stop the heparin gtt, stop antibiotics, stop blood draws, and add medications to make the patient comfortable.  She was already DNR/DNI.  Denis understands that his mother will continue to fight, but may decide to pass away naturally.  Family's questions were answered, concerns addressed.  Primary team available if pt or family needs us.    What Matters Most To Patient and Family:  · What matters most to patient and family  Comfort, dignity    Personal Advance Directives Treatment Guidelines:   Treatment Guidelines:  · Decision Maker  Surrogate  · Treatment Guidelines  DNR Order; Comfort measures only; No blood draws; No artificial nutrition; No antibiotics    MOLST:  · Completed  11-Jun-2022    Location of Discussion:   Time Spent on Advance Care Planning:  I spent 30 (in minutes) on advance care planning services with the patient.  This time is separate and distinct from any other care management services provided on this date.    Location of Discussion:  · Location of discussion  Face to face      Electronic Signatures:  Chas Lloyd)  (Signed 12-Jun-2022 15:44)  	Authored: Goals of Care Conversation, Personal Advance Directives Treatment Guidelines, Location of Discussion      Last Updated: 12-Jun-2022 15:44 by Chas Lloyd)      89y/o F  bedbound w/ h/o CVA w/ old left MCA infarct, AFIB  HTN, HLD, CAD, dementia BIBEMS as transfer from [place] for left lower leg ischemia. As per EMS, the family noticed that patient's left lower leg turning colder and blue 2 days ago and decided to call EMS. Patient was seen by vascular sugery and was not a surgical candidate as per vascular. Hospital course was complicated by ROBERTO CARLOS and UTI. Admitted to Palliative care unit for symptom management.    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 As per EMS, the family noticed that patient's left lower leg turning colder and blue 2 days ago and decided to call EMS. Patient was seen by vascular sugery and was not a surgical candidate as per vascular. Hospital course was complicated by ROBERTO CARLOS and UTI. Admitted to Palliative care unit for symptom management.

## 2022-06-15 NOTE — PROGRESS NOTE ADULT - PROBLEM SELECTOR PLAN 7
pt requiring full supportive care with all ADL's.  PPSV score 30%. pt requiring full supportive care with all ADL's.  -PPSV 30% needs assistance with all basic needs.  - Supportive care  -Turn and position  -Continue with good skin care.

## 2022-06-15 NOTE — PROGRESS NOTE ADULT - PROBLEM SELECTOR PLAN 1
seen by surgery deemed not a surgical candidate.  Acute ischemic limb likely 2/2 to thromboembolic event.     - Pt started on Heparin infusion, now d/c-ed after family discussion   - Off load pressure points on L foot   - Pain control with IV dilaudid  - Goal is comfort care seen by surgery deemed not a surgical candidate.  Acute ischemic limb likely 2/2 to thromboembolic event.     - Pt started on Heparin infusion, now d/c-ed after family discussion   - Off load pressure points on L foot   - Pain control with IV Dilaudid  - Goal is comfort care

## 2022-06-15 NOTE — PROGRESS NOTE ADULT - PROBLEM SELECTOR PLAN 8
Goal is comfort care  Pt's son and Daughter in Law are surrogates.   On Dilaudid for pain and dyspnea  Ativan for agitation  Glycopyrrolate for secretions

## 2022-06-16 NOTE — PROGRESS NOTE ADULT - PROBLEM SELECTOR PLAN 6
Pt is bedbound and minimally/non verbal at baseline per chart notes.   Encephalopathy multifactorial including but not limited to, UTI, hospitalization, infection.  Goal is comfort care.  Monitor for agitation/ Delirium.  Monitor for constipation, urinary retention, pain, hunger, thirst, etc.  Promote sleep wake cycle and reorientation as indicated.

## 2022-06-16 NOTE — PROGRESS NOTE ADULT - PROBLEM SELECTOR PROBLEM 8
Prophylactic measure
Encounter for palliative care
Encounter for palliative care
Prophylactic measure
No

## 2022-06-16 NOTE — PROGRESS NOTE ADULT - PROBLEM SELECTOR PLAN 7
pt requiring full supportive care with all ADL's.  -PPSV 30% needs assistance with all basic needs.  - Supportive care  -Turn and position  -Continue with good skin care.

## 2022-06-16 NOTE — PROGRESS NOTE ADULT - PROBLEM SELECTOR PLAN 8
Goal is comfort care  Pt's son and Daughter in Law are surrogates.   On Dilaudid for pain and dyspnea  Ativan for agitation  Glycopyrrolate for secretions.  Spoke with Daughter in Law yesterday. (chelly [phone]) She will be coming to visit in person.   plan to discuss with Family members regarding discharge disposition if patient remains stable.

## 2022-06-16 NOTE — PROGRESS NOTE ADULT - SUBJECTIVE AND OBJECTIVE BOX
GAP TEAM PALLIATIVE CARE UNIT PROGRESS NOTE:      [  ] Patient on hospice program.    INDICATION FOR PALLIATIVE CARE UNIT SERVICES/Interval HPI:    OVERNIGHT EVENTS:    DNR on chart: Yes  Yes      Allergies    No Known Allergies    Intolerances    MEDICATIONS  (STANDING):  HYDROmorphone  Injectable 0.2 milliGRAM(s) IV Push every 6 hours    MEDICATIONS  (PRN):  acetaminophen  Suppository .. 650 milliGRAM(s) Rectal every 6 hours PRN Temp greater or equal to 38C (100.4F), Mild Pain (1 - 3)  bisacodyl Suppository 10 milliGRAM(s) Rectal daily PRN Constipation  glycopyrrolate Injectable 0.4 milliGRAM(s) IV Push four times a day PRN Secretions  HYDROmorphone  Injectable 0.5 milliGRAM(s) IV Push every 1 hour PRN Severe Pain (7 - 10)  HYDROmorphone  Injectable 0.2 milliGRAM(s) IV Push every 1 hour PRN Moderate Pain (4 - 6)  HYDROmorphone  Injectable 0.5 milliGRAM(s) IV Push every 1 hour PRN dyspnea  LORazepam   Injectable 0.5 milliGRAM(s) IV Push every 1 hour PRN Anxiety    ITEMS UNCHECKED ARE NOT PRESENT    PRESENT SYMPTOMS: [ ]Unable to self-report  [ ]PAINADs [ ]RDOS  Source if other than patient:  [ ]Family   [ ]Team     Pain: [ ] yes [ ] no  QOL impact -   Location -                    Aggravating factors -  Quality -  Radiation -  Timing-  Severity (0-10 scale):  Minimal acceptable level (0-10 scale):     PAINAD Score:  http://geriatrictoolkit.[email or web address]/cog/painad.pdf (Ctrl +  left click to view)    Dyspnea:                           [ ]Mild [ ]Moderate [ ]Severe    RDOS:  0 to 2  minimal or no respiratory distress   3  mild distress  4 to 6 moderate distress  >7 severe distress  [email or web address]_Distress_Observation_Scale.pdf (Ctrl +  left click to view)     Anxiety:                             [ ]Mild [ ]Moderate [ ]Severe  Fatigue:                             [ ]Mild [ ]Moderate [ ]Severe  Nausea:                             [ ]Mild [ ]Moderate [ ]Severe  Loss of appetite:              [ ]Mild [ ]Moderate [ ]Severe  Constipation:                    [ ]Mild [ ]Moderate [ ]Severe  		  Other Symptoms:  [ ]All other review of systems negative     Palliative Performance Status Version 2:         %         http://npcrc.org/files/news/palliative_performance_scale_ppsv2.pdf  PHYSICAL EXAM:   Vital Signs Last 24 Hrs  T(C): 36.6 (16 Jun 2022 08:22), Max: 36.6 (16 Jun 2022 08:22)  T(F): 97.9 (16 Jun 2022 08:22), Max: 97.9 (16 Jun 2022 08:22)  HR: 139 (16 Jun 2022 08:22) (139 - 139)  BP: 132/97 (16 Jun 2022 08:22) (132/97 - 132/97)  BP(mean): --  RR: 20 (16 Jun 2022 08:22) (20 - 20)  SpO2: 90% (16 Jun 2022 08:22) (90% - 90%) I&O's Summary    15 Tobi 2022 07:01  -  16 Jun 2022 07:00  --------------------------------------------------------  IN: 420 mL / OUT: 450 mL / NET: -30 mL      GENERAL: [ ] Cachexia  [ ]Alert  [ ]Oriented x   [ ]Lethargic  [ ]Unarousable  [ ]Verbal  [ ]Non-Verbal  Behavioral:   [ ] Anxiety  [ ] Delirium [ ] Agitation [ ] Other  HEENT:  [ ]Normal   [ ]Dry mouth   [ ]ET Tube/Trach  [ ]Oral lesions  PULMONARY:   [ ]Clear [ ]Tachypnea  [ ]Audible excessive secretions   [ ]Rhonchi        [ ]Right [ ]Left [ ]Bilateral  [ ]Crackles        [ ]Right [ ]Left [ ]Bilateral  [ ]Wheezing     [ ]Right [ ]Left [ ]Bilateral  [ ]Diminished BS [ ]Right [ ]Left [ ]Bilateral    CARDIOVASCULAR:    [ ]Regular [ ]Irregular [ ]Tachy  [ ]Jefry [ ]Murmur [ ]Other  GASTROINTESTINAL:  [ ]Soft  [ ]Distended   [ ]+BS  [ ]Non tender [ ]Tender  [ ]Other [ ]PEG [ ]OGT/ NGT   Last BM:    GENITOURINARY:  [ ]Normal [ ] Incontinent   [ ]Oliguria/Anuria   [ ]Bradley  MUSCULOSKELETAL:   [ ]Normal   [ ]Weakness  [ ]Bed/Wheelchair bound [ ]Edema  NEUROLOGIC:   [ ]No focal deficits  [ ] Cognitive impairment  [ ] Dysphagia [ ]Dysarthria [ ] Paresis [ ]Other   SKIN:   [ ]Normal  [ ]Rash  [ ]Other  [ ]Pressure ulcer(s)  [ ]y [ ]n  Present on admission      CRITICAL CARE:  [ ] Shock Present  [ ]Septic [ ]Cardiogenic [ ]Neurologic [ ]Hypovolemic  [ ]  Vasopressors [ ]  Inotropes   [ ] Respiratory failure present [ ] Mechanical Ventilation [ ] Non-invasive ventilatory support [ ] High-Flow  [ ] Acute  [ ] Chronic [ ] Hypoxic  [ ] Hypercarbic [ ] Other  [ ] Other organ failure     LABS:            RADIOLOGY & ADDITIONAL STUDIES:    PROTEIN CALORIE MALNUTRITION: [ ] mild [ ] moderate [ ] severe  [ ] underweight [ ] morbid obesity    https://www.andeal.org/vault/2440/web/files/ONC/Table_Clinical%20Characteristics%20to%20Document%20Malnutrition-White%20JV%20et%20al%553779.pdf    Height (cm): 160 (06-10-22 @ 18:10), 160 (06-10-22 @ 16:36), 160 (01-15-22 @ 13:58)  Weight (kg): 58.3 (06-10-22 @ 18:21), 52.2 (06-10-22 @ 16:36), 56.2 (01-14-22 @ 01:16)  BMI (kg/m2): 22.8 (06-10-22 @ 18:21), 20.4 (06-10-22 @ 18:10), 20.4 (06-10-22 @ 16:36)    [ ] PPSV2 < or = 30% [ ] significant weight loss [ ] poor nutritional intake [ ] anasarca   Artificial Nutrition [ ]     REFERRALS:   [ ]Chaplaincy  [ ] Hospice  [ ]Child Life  [ ]Social Work  [ ]Case management [ ]Holistic Therapy [ ] Physical Therapy [ ] Dietary   Goals of Care Document: JACKY Lloyd (06-12-22 @ 15:29)  Goals of Care Conversation:   Participants:  · Participants  Family  · Child(mayelin Barrios    Advance Directives:  · Does patient have Advance Directive  No  · Do you want to complete the HCP and name a [facility] Care Agent  No  · Does Patient Have a Surrogate  No  · Does the Patient have a Court Appointed Guardian ([address])  No  · Caregiver:  no    Conversation Discussion:  · Conversation  Prognosis; MOLST Discussed; Treatment Options  · Conversation Details  Ms. Arriola is a 89/F w/ dementia, bedbound, who has a hx of MCA infarct, AF, HTN, HLD, CAD and who was transferred from [facility] for left lower leg ischemia.  Pt seen by surgery.  The limb is not salvageable and will need amputation however the operation carries a high risk in this pt and in earlier discussion family opted for conservative management.  Further goals of care between son Denis, dtr-in-law Michelle, and me revealed that the family wants to focus on making the patient comfortable.  They know that although she is a fighter she has likely reached the end of her life and has implored me to "let her die in peace".  To that end, in shared decision-making, we will stop the heparin gtt, stop antibiotics, stop blood draws, and add medications to make the patient comfortable.  She was already DNR/DNI.  Denis understands that his mother will continue to fight, but may decide to pass away naturally.  Family's questions were answered, concerns addressed.  Primary team available if pt or family needs us.    What Matters Most To Patient and Family:  · What matters most to patient and family  Comfort, dignity    Personal Advance Directives Treatment Guidelines:   Treatment Guidelines:  · Decision Maker  Surrogate  · Treatment Guidelines  DNR Order; Comfort measures only; No blood draws; No artificial nutrition; No antibiotics    MOLST:  · Completed  11-Jun-2022    Location of Discussion:   Time Spent on Advance Care Planning:  I spent 30 (in minutes) on advance care planning services with the patient.  This time is separate and distinct from any other care management services provided on this date.    Location of Discussion:  · Location of discussion  Face to face      Electronic Signatures:  Chas Lloyd)  (Signed 12-Jun-2022 15:44)  	Authored: Goals of Care Conversation, Personal Advance Directives Treatment Guidelines, Location of Discussion      Last Updated: 12-Jun-2022 15:44 by Chas Lloyd)     GAP TEAM PALLIATIVE CARE UNIT PROGRESS NOTE:      [  ] Patient on hospice program.    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 As per EMS, the family noticed that patient's left lower leg turning colder and blue 2 days ago and decided to call EMS. Patient was seen by vascular sugery and was not a surgical candidate as per vascular. Hospital course was complicated by ROBERTO CARLOS and UTI. Palliative was consulted for GOC and pain management.    OVERNIGHT EVENTS:  Pt is seen and examined at bedside. Patient's PAINAD score was 0 and remains comfortable. This morning on rounds, she repeatedly stated "Outside", "Thank you" and "I love you". In the past 24 Hour period 8AM to 8AM, she did not receive any PRN medication. This morning, her temp is 97.9, RR 20, SpO2 90% on Room air, /97 and . Her last BM was 6/13. She has a davila in place (Day6) and continues to make urine output.    DNR on chart: Yes  Yes      Allergies    No Known Allergies    Intolerances    MEDICATIONS  (STANDING):  HYDROmorphone  Injectable 0.2 milliGRAM(s) IV Push every 6 hours    MEDICATIONS  (PRN):  acetaminophen  Suppository .. 650 milliGRAM(s) Rectal every 6 hours PRN Temp greater or equal to 38C (100.4F), Mild Pain (1 - 3)  bisacodyl Suppository 10 milliGRAM(s) Rectal daily PRN Constipation  glycopyrrolate Injectable 0.4 milliGRAM(s) IV Push four times a day PRN Secretions  HYDROmorphone  Injectable 0.5 milliGRAM(s) IV Push every 1 hour PRN Severe Pain (7 - 10)  HYDROmorphone  Injectable 0.2 milliGRAM(s) IV Push every 1 hour PRN Moderate Pain (4 - 6)  HYDROmorphone  Injectable 0.5 milliGRAM(s) IV Push every 1 hour PRN dyspnea  LORazepam   Injectable 0.5 milliGRAM(s) IV Push every 1 hour PRN Anxiety    ITEMS UNCHECKED ARE NOT PRESENT    PRESENT SYMPTOMS: [x]Unable to self-report  [x]PAINADs [x]RDOS  Source if other than patient:  [ ]Family   [ ]Team     Pain: [ ] yes [x] no  QOL impact -   Location -                    Aggravating factors -  Quality -  Radiation -  Timing-  Severity (0-10 scale):  Minimal acceptable level (0-10 scale):     PAINAD Score: 0  http://geriatrictoolkit.[facility]/cog/painad.pdf (Ctrl +  left click to view)    Dyspnea:                           [ ]Mild [ ]Moderate [ ]Severe    RDOS: 1  0 to 2  minimal or no respiratory distress   3  mild distress  4 to 6 moderate distress  >7 severe distress  [email or web address]/handouts/hen/Respiratory_Distress_Observation_Scale.pdf (Ctrl +  left click to view)     Anxiety:                             [ ]Mild [ ]Moderate [ ]Severe  Fatigue:                             [ ]Mild [ ]Moderate [ ]Severe  Nausea:                             [ ]Mild [ ]Moderate [ ]Severe  Loss of appetite:              [ ]Mild [ ]Moderate [ ]Severe  Constipation:                    [ ]Mild [ ]Moderate [ ]Severe  		  Other Symptoms:  [ ]All other review of systems negative     Palliative Performance Status Version 2:    10     %         http://npcrc.org/files/news/palliative_performance_scale_ppsv2.pdf  PHYSICAL EXAM:   Vital Signs Last 24 Hrs  T(C): 36.6 (16 Jun 2022 08:22), Max: 36.6 (16 Jun 2022 08:22)  T(F): 97.9 (16 Jun 2022 08:22), Max: 97.9 (16 Jun 2022 08:22)  HR: 139 (16 Jun 2022 08:22) (139 - 139)  BP: 132/97 (16 Jun 2022 08:22) (132/97 - 132/97)  BP(mean): --  RR: 20 (16 Jun 2022 08:22) (20 - 20)  SpO2: 90% (16 Jun 2022 08:22) (90% - 90%) I&O's Summary    15 Tobi 2022 07:01  -  16 Jun 2022 07:00  --------------------------------------------------------  IN: 420 mL / OUT: 450 mL / NET: -30 mL      GENERAL: [ ] Cachexia  [ ]Alert  [ ]Oriented x   [x ]Lethargic  [ ]Unarousable  [x ]Verbal  [ ]Non-Verbal  Behavioral:   [ ] Anxiety  [ ] Delirium [ ] Agitation [ ] Other  HEENT:  [ ]Normal   [ ]Dry mouth   [ ]ET Tube/Trach  [ ]Oral lesions  PULMONARY:   [x ]Clear [ ]Tachypnea  [ ]Audible excessive secretions   [ ]Rhonchi        [ ]Right [ ]Left [ ]Bilateral  [ ]Crackles        [ ]Right [ ]Left [ ]Bilateral  [ ]Wheezing     [ ]Right [ ]Left [ ]Bilateral  [ ]Diminished BS [ ]Right [ ]Left [ ]Bilateral    CARDIOVASCULAR:    [ ]Regular [x]Irregular [x]Tachy  [ ]Jefry [ ]Murmur [ ]Other  GASTROINTESTINAL:  [x]Soft  [ ]Distended   [ x]+BS  [x]Non tender [ ]Tender  [ ]Other [ ]PEG [ ]OGT/ NGT   Last BM: 6/13   GENITOURINARY:  [ ]Normal [ ] Incontinent   [ ]Oliguria/Anuria   [x]Davila Day 7  MUSCULOSKELETAL:   [ ]Normal   [ ]Weakness  [ x]Bed/Wheelchair bound [ ]Edema  NEUROLOGIC:   [ ]No focal deficits  [ ] Cognitive impairment  [ ] Dysphagia [ ]Dysarthria [ ] Paresis [ ]Other   SKIN:   [ ]Normal  [ ]Rash  [x ]Other LLE Ischemic limb with discoloration.  [ ]Pressure ulcer(s)  [ ]y [ ]n  Present on admission      CRITICAL CARE:  [ ] Shock Present  [ ]Septic [ ]Cardiogenic [ ]Neurologic [ ]Hypovolemic  [ ]  Vasopressors [ ]  Inotropes   [ ] Respiratory failure present [ ] Mechanical Ventilation [ ] Non-invasive ventilatory support [ ] High-Flow  [ ] Acute  [ ] Chronic [ ] Hypoxic  [ ] Hypercarbic [ ] Other  [x ] Other organ failure ischemic limb.    LABS:            RADIOLOGY & ADDITIONAL STUDIES:    PROTEIN CALORIE MALNUTRITION: [ ] mild [ ] moderate [ ] severe  [ ] underweight [ ] morbid obesity    https://www.andeal.org/vault/7930/web/files/ONC/Table_Clinical%20Characteristics%20to%20Document%20Malnutrition-White%20JV%20et%20al%202012.pdf    Height (cm): 160 (06-10-22 @ 18:10), 160 (06-10-22 @ 16:36), 160 (01-15-22 @ 13:58)  Weight (kg): 58.3 (06-10-22 @ 18:21), 52.2 (06-10-22 @ 16:36), 56.2 (01-14-22 @ 01:16)  BMI (kg/m2): 22.8 (06-10-22 @ 18:21), 20.4 (06-10-22 @ 18:10), 20.4 (06-10-22 @ 16:36)    [x ] PPSV2 < or = 30% [ ] significant weight loss [ ] poor nutritional intake [ ] anasarca   Artificial Nutrition [ ]     REFERRALS:   [ ]Chaplaincy  [ ] Hospice  [ ]Child Life  [ ]Social Work  [ ]Case management [ ]Holistic Therapy [ ] Physical Therapy [ ] Dietary   Goals of Care Document: JACKY Lloyd (06-12-22 @ 15:29)  Goals of Care Conversation:   Participants:  · Participants  Family  · Child(mayelin Barrios    Advance Directives:  · Does patient have Advance Directive  No  · Do you want to complete the HCP and name a [facility] Care Agent  No  · Does Patient Have a Surrogate  No  · Does the Patient have a Court Appointed Guardian ([address])  No  · Caregiver:  no    Conversation Discussion:  · Conversation  Prognosis; MOLST Discussed; Treatment Options  · Conversation Details  Ms. Arriola is a 89/F w/ dementia, bedbound, who has a hx of MCA infarct, AF, HTN, HLD, CAD and who was transferred from [facility] for left lower leg ischemia.  Pt seen by surgery.  The limb is not salvageable and will need amputation however the operation carries a high risk in this pt and in earlier discussion family opted for conservative management.  Further goals of care between son Denis, dtr-in-law Michelle, and me revealed that the family wants to focus on making the patient comfortable.  They know that although she is a fighter she has likely reached the end of her life and has implored me to "let her die in peace".  To that end, in shared decision-making, we will stop the heparin gtt, stop antibiotics, stop blood draws, and add medications to make the patient comfortable.  She was already DNR/DNI.  Denis understands that his mother will continue to fight, but may decide to pass away naturally.  Family's questions were answered, concerns addressed.  Primary team available if pt or family needs us.    What Matters Most To Patient and Family:  · What matters most to patient and family  Comfort, dignity    Personal Advance Directives Treatment Guidelines:   Treatment Guidelines:  · Decision Maker  Surrogate  · Treatment Guidelines  DNR Order; Comfort measures only; No blood draws; No artificial nutrition; No antibiotics    MOLST:  · Completed  11-Jun-2022    Location of Discussion:   Time Spent on Advance Care Planning:  I spent 30 (in minutes) on advance care planning services with the patient.  This time is separate and distinct from any other care management services provided on this date.    Location of Discussion:  · Location of discussion  Face to face      Electronic Signatures:  Chas Lloyd)  (Signed 12-Jun-2022 15:44)  	Authored: Goals of Care Conversation, Personal Advance Directives Treatment Guidelines, Location of Discussion      Last Updated: 12-Jun-2022 15:44 by Chas Lloyd)

## 2022-06-16 NOTE — PROGRESS NOTE ADULT - ASSESSMENT
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 As per EMS, the family noticed that patient's left lower leg turning colder and blue 2 days ago and decided to call EMS. Patient was seen by vascular sugery and was not a surgical candidate as per vascular. Hospital course was complicated by ROBERTO CARLOS and UTI. Admitted to Palliative care unit for symptom management.

## 2022-06-16 NOTE — PROGRESS NOTE ADULT - PROBLEM SELECTOR PLAN 1
seen by surgery deemed not a surgical candidate.  Acute ischemic limb likely 2/2 to thromboembolic event.     - Pt started on Heparin infusion, now d/c-ed after family discussion   - Off load pressure points on L foot   - Pain control with IV Dilaudid  - Goal is comfort care.

## 2022-06-16 NOTE — PROGRESS NOTE ADULT - PROBLEM SELECTOR PLAN 4
H/o Advanced dementia hx of bed bound and non-verbal per chart.   - Pt currently DNR/DNI and comfort care   - S&S eval recommended pureed diet with mildly thick liquid.  - Pt is tolerating PO diet and appears to be similar intake at baseline per family.

## 2022-06-17 NOTE — PROGRESS NOTE ADULT - PROBLEM SELECTOR PLAN 3
Patient at high risk for revascularization due to co-morbidities and poor functional status.  At this point limb is no longer salvageable.  Protect skin from further injury.  Monitor for pain.

## 2022-06-17 NOTE — PROGRESS NOTE ADULT - PROBLEM SELECTOR PLAN 1
Likely due to ischemic limb.  Monitoring PAIN ADs.  Offload blankets to left leg with bed cradle.  Monitor skin to minimize further damage.      PRN hydromorphone available, none required past 24 hours.  On same RTC for pain/dyspnea.    Bowel regimen ordered, monitor for constipation.

## 2022-06-17 NOTE — PROGRESS NOTE ADULT - SUBJECTIVE AND OBJECTIVE BOX
GAP TEAM PALLIATIVE CARE UNIT PROGRESS NOTE:      [  ] Patient on hospice program.    INDICATION FOR PALLIATIVE CARE UNIT SERVICES/Interval HPI:  89y/o F bedbound w/ h/o left MCA infarct, AFIB  HTN, HLD, CAD, dementia transferred from [place] for left lower leg ischemia.  Given co-morbidities and functional status, patient transferred to PCU for symptom management and end of life care.      OVERNIGHT EVENTS:  Patient with diminished responsiveness today.  Required ativan x 1 for agitation past 24 hours.  Limb ischemia - foot is duskier but advancement up the leg is not seen.  Remains pain free with RTC IV hydromorphone 0.2mg every 6 hours.        DNR on chart: Yes  Yes      Allergies    No Known Allergies    Intolerances    MEDICATIONS  (STANDING):  HYDROmorphone  Injectable 0.2 milliGRAM(s) IV Push every 6 hours    MEDICATIONS  (PRN):  acetaminophen  Suppository .. 650 milliGRAM(s) Rectal every 6 hours PRN Temp greater or equal to 38C (100.4F), Mild Pain (1 - 3)  bisacodyl Suppository 10 milliGRAM(s) Rectal daily PRN Constipation  glycopyrrolate Injectable 0.4 milliGRAM(s) IV Push four times a day PRN Secretions  HYDROmorphone  Injectable 0.5 milliGRAM(s) IV Push every 1 hour PRN Severe Pain (7 - 10)  HYDROmorphone  Injectable 0.2 milliGRAM(s) IV Push every 1 hour PRN Moderate Pain (4 - 6)  HYDROmorphone  Injectable 0.5 milliGRAM(s) IV Push every 1 hour PRN dyspnea  LORazepam   Injectable 0.5 milliGRAM(s) IV Push every 1 hour PRN Anxiety    ITEMS UNCHECKED ARE NOT PRESENT    PRESENT SYMPTOMS: [x]Unable to self-report  [x]PAINADs [x]RDOS  Source if other than patient:  [ ]Family   [ ]Team     Pain: [ ] yes [x] no  QOL impact -   Location -                    Aggravating factors -  Quality -  Radiation -  Timing-  Severity (0-10 scale):  Minimal acceptable level (0-10 scale):     PAINAD Score: 0  [email or web address] (Ctrl +  left click to view)    Dyspnea:                           [ ]Mild [ ]Moderate [ ]Severe    RDOS: 1  0 to 2  minimal or no respiratory distress   3  mild distress  4 to 6 moderate distress  >7 severe distress  [email or web address]://[email or web address]/handouts/hen/Respiratory_Distress_Observation_Scale.pdf (Ctrl +  left click to view)     Anxiety:                             [ ]Mild [ ]Moderate [ ]Severe  Fatigue:                             [ ]Mild [ ]Moderate [ ]Severe  Nausea:                             [ ]Mild [ ]Moderate [ ]Severe  Loss of appetite:              [ ]Mild [ ]Moderate [ ]Severe  Constipation:                    [ ]Mild [ ]Moderate [ ]Severe  		  Other Symptoms:  [ ]All other review of systems negative     Palliative Performance Status Version 2:    10     %         [email or web address]/files/[email or web address]_performance_scale_[email or web address].pdf  PHYSICAL EXAM:     Vital Signs Last 24 Hrs  T(C): 36.8 (17 Jun 2022 09:10), Max: 36.8 (17 Jun 2022 09:10)  T(F): 98.3 (17 Jun 2022 09:10), Max: 98.3 (17 Jun 2022 09:10)  HR: 138 (17 Jun 2022 09:10) (138 - 138)  BP: 146/95 (17 Jun 2022 09:10) (146/95 - 146/95)  BP(mean): --  RR: 20 (17 Jun 2022 09:10) (20 - 20)  SpO2: 85% (17 Jun 2022 09:10) (85% - 85%)    GENERAL: [ ] Cachexia  [ ]Alert  [ ]Oriented x   [x ]Lethargic  [ ]Unarousable  [ ]Verbal  [x ]Non-Verbal  Behavioral:   [ ] Anxiety  [ ] Delirium [x ] Agitation [ ] Other  HEENT:  [ ]Normal   [x ]Dry mouth   [ ]ET Tube/Trach  [ ]Oral lesions  PULMONARY:   [x ]Clear [ ]Tachypnea  [ ]Audible excessive secretions   [ ]Rhonchi        [ ]Right [ ]Left [ ]Bilateral  [ ]Crackles        [ ]Right [ ]Left [ ]Bilateral  [ ]Wheezing     [ ]Right [ ]Left [ ]Bilateral  [ ]Diminished BS [ ]Right [ ]Left [ ]Bilateral    CARDIOVASCULAR:    [ ]Regular [x]Irregular [x]Tachy  [ ]Jefry [ ]Murmur [ ]Other  GASTROINTESTINAL:  [x]Soft  [ ]Distended   [ x]+BS  [x]Non tender [ ]Tender  [ ]Other [ ]PEG [ ]OGT/ NGT   Last BM: 6/14/22  GENITOURINARY:  [ ]Normal [ ] Incontinent   [ ]Oliguria/Anuria   [x][place]   MUSCULOSKELETAL:   [ ]Normal   [ ]Weakness  [ x]Bed/Wheelchair bound [ ]Edema  left leg ischemic to mid calf  NEUROLOGIC:   [ ]No focal deficits  [ ] Cognitive impairment  [ ] Dysphagia [ ]Dysarthria [ ] Paresis [ ]Other   SKIN:   [ ]Normal  [ ]Rash  [x ]Other LLE Ischemic limb with discoloration.  [ ]Pressure ulcer(s)  [ ]y [ ]n  Present on admission      CRITICAL CARE:  [ ] Shock Present  [ ]Septic [ ]Cardiogenic [ ]Neurologic [ ]Hypovolemic  [ ]  Vasopressors [ ]  Inotropes   [ ] Respiratory failure present [ ] Mechanical Ventilation [ ] Non-invasive ventilatory support [ ] High-Flow  [ ] Acute  [ ] Chronic [ ] Hypoxic  [ ] Hypercarbic [ ] Other  [x ] Other organ failure ischemic limb.    LABS:  None new.           RADIOLOGY & ADDITIONAL STUDIES: None new.     PROTEIN CALORIE MALNUTRITION: [ ] mild [ ] moderate [ ] severe  [ ] underweight [ ] morbid obesity    https://www.andeal.org/vault/2440/web/files/ONC/Table_Clinical%20Characteristics%20to%20Document%20Malnutrition-White%20JV%20et%20al%596703.pdf    Height (cm): 160 (06-10-22 @ 18:10), 160 (06-10-22 @ 16:36), 160 (01-15-22 @ 13:58)  Weight (kg): 58.3 (06-10-22 @ 18:21), 52.2 (06-10-22 @ 16:36), 56.2 (01-14-22 @ 01:16)  BMI (kg/m2): 22.8 (06-10-22 @ 18:21), 20.4 (06-10-22 @ 18:10), 20.4 (06-10-22 @ 16:36)    [x ] PPSV2 < or = 30% [ ] significant weight loss [ ] poor nutritional intake [ ] anasarca   Artificial Nutrition [ ]     REFERRALS:   [ ]Chaplaincy  [ ] Hospice  [ ]Child Life  [ ]Social Work  [ ]Case management [ ]Holistic Therapy [ ] Physical Therapy [ ] Dietary   Goals of Care Document: JACKY Lloyd (06-12-22 @ 15:29)  Goals of Care Conversation:   Participants:  · Participants  Family  · Child(mayelin Barrios    Advance Directives:  · Does patient have Advance Directive  No  · Do you want to complete the HCP and name a [facility] Care Agent  No  · Does Patient Have a Surrogate  No  · Does the Patient have a Court Appointed Guardian ([id number])  No  · Caregiver:  no    Conversation Discussion:  · Conversation  Prognosis; MOLST Discussed; Treatment Options  · Conversation Details  Ms. Arriola is a 89/F w/ dementia, bedbound, who has a hx of MCA infarct, AF, HTN, HLD, CAD and who was transferred from [facility] for left lower leg ischemia.  Pt seen by surgery.  The limb is not salvageable and will need amputation however the operation carries a high risk in this pt and in earlier discussion family opted for conservative management.  Further goals of care between son Denis, dtr-in-law Michelle, and me revealed that the family wants to focus on making the patient comfortable.  They know that although she is a fighter she has likely reached the end of her life and has implored me to "let her die in peace".  To that end, in shared decision-making, we will stop the heparin gtt, stop antibiotics, stop blood draws, and add medications to make the patient comfortable.  She was already DNR/DNI.  Denis understands that his mother will continue to fight, but may decide to pass away naturally.  Family's questions were answered, concerns addressed.  Primary team available if pt or family needs us.    What Matters Most To Patient and Family:  · What matters most to patient and family  Comfort, dignity    Personal Advance Directives Treatment Guidelines:   Treatment Guidelines:  · Decision Maker  Surrogate  · Treatment Guidelines  DNR Order; Comfort measures only; No blood draws; No artificial nutrition; No antibiotics    MOLST:  · Completed  11-Jun-2022    Location of Discussion:   Time Spent on Advance Care Planning:  I spent 30 (in minutes) on advance care planning services with the patient.  This time is separate and distinct from any other care management services provided on this date.    Location of Discussion:  · Location of discussion  Face to face      Electronic Signatures:  Chas Lloyd)  (Signed 12-Jun-2022 15:44)  	Authored: Goals of Care Conversation, Personal Advance Directives Treatment Guidelines, Location of Discussion      Last Updated: 12-Jun-2022 15:44 by Chas Lloyd)

## 2022-06-17 NOTE — PROGRESS NOTE ADULT - PROBLEM SELECTOR PLAN 7
Goal is comfort care  Pt's son and Daughter in Law are surrogates.   Michelle [phone]  In the setting of parenteral controlled substance administration, clinical monitoring required for side effects such as respiratory depression, constipation and opioid induced neurotoxicity.  This patient is at [ ]high [x ] medium [ ] low risk due to multiple comorbidities and poor functional status.    [ ] The patient is receiving IV and has required  escalation for uncontrolled symptoms.  [ ] To taper and monitor for emergence of symptoms.   [x ] No change in regimen.    Hospice transition to be addressed if clinical condition permits.   Patient assessment and plan discussed on interdisciplinary team rounds today.

## 2022-06-17 NOTE — PROGRESS NOTE ADULT - ASSESSMENT
89y/o F  bedbound w/ h/o CVA w/ old left MCA infarct, AFIB  HTN, HLD, CAD, dementia BIBEMS as transfer from [place] for left lower leg ischemia. Given multiple co-morbidities and poor functional status, family has opted for symptom management.  Patient is less responsive than yesterday with increased duskiness of the foot.  Demarcation remains at mid-calf.

## 2022-06-17 NOTE — PROGRESS NOTE ADULT - PROBLEM SELECTOR PLAN 5
Multiple factors - serious illness, metabolic derangements, acute hospitalization.  Monitor for constipation, urinary retention, pain, hunger, thirst, etc.  Promote sleep wake cycle and reorientation as indicated.    Ativan PRN x 1 required past 24 hours.

## 2022-06-18 NOTE — PROGRESS NOTE ADULT - PROBLEM SELECTOR PLAN 4
Fast 7E.  Now completely non-verbal and appears to be entering more active phase of dying.  - Supportive care

## 2022-06-18 NOTE — PROGRESS NOTE ADULT - PROBLEM SELECTOR PROBLEM 2
Acute UTI
Dyspnea and respiratory abnormality
Functional quadriplegia
Acute UTI
Acute UTI
Dyspnea and respiratory abnormality
Acute UTI

## 2022-06-18 NOTE — PROGRESS NOTE ADULT - SUBJECTIVE AND OBJECTIVE BOX
GAP TEAM PALLIATIVE CARE UNIT PROGRESS NOTE:      [  ] Patient on hospice program.    INDICATION FOR PALLIATIVE CARE UNIT SERVICES/Interval HPI: Symptom-directed care    OVERNIGHT EVENTS: Used x1 PRN Dilaudid 0.2mg IV for moderate pain, x1 PRN 0.5mg IV for severe pain and x1 PRN 0.5mg IV for dyspnea.    DNR on chart: Yes  Yes      Allergies    No Known Allergies    Intolerances    MEDICATIONS  (STANDING):  HYDROmorphone  Injectable 0.5 milliGRAM(s) IV Push every 6 hours    MEDICATIONS  (PRN):  acetaminophen  Suppository .. 650 milliGRAM(s) Rectal every 6 hours PRN Temp greater or equal to 38C (100.4F), Mild Pain (1 - 3)  bisacodyl Suppository 10 milliGRAM(s) Rectal daily PRN Constipation  glycopyrrolate Injectable 0.4 milliGRAM(s) IV Push four times a day PRN Secretions  HYDROmorphone  Injectable 0.5 milliGRAM(s) IV Push every 1 hour PRN Severe Pain (7 - 10)  HYDROmorphone  Injectable 0.2 milliGRAM(s) IV Push every 1 hour PRN Moderate Pain (4 - 6)  HYDROmorphone  Injectable 0.5 milliGRAM(s) IV Push every 1 hour PRN dyspnea  LORazepam   Injectable 0.5 milliGRAM(s) IV Push every 1 hour PRN Anxiety    ITEMS UNCHECKED ARE NOT PRESENT    PRESENT SYMPTOMS: [X]Unable to self-report  [X]PAINADs [X]RDOS  Source if other than patient:  [ ]Family   [ ]Team     Pain: [ ] yes [X] no  QOL impact -   Location -                    Aggravating factors -  Quality -  Radiation -  Timing-  Severity (0-10 scale):  Minimal acceptable level (0-10 scale):     PAINAD Score: 0  http://geriatrictoolkit.[place].[email or web address]/cog/painad.pdf (Ctrl +  left click to view)    Dyspnea:                           [ ]Mild [ ]Moderate [ ]Severe    RDOS: 2  0 to 2  minimal or no respiratory distress   3  mild distress  4 to 6 moderate distress  >7 severe distress  https://[email or web address]/handouts/hen/Respiratory_Distress_Observation_Scale.pdf (Ctrl +  left click to view)    Anxiety:                             [ ]Mild [ ]Moderate [ ]Severe  Fatigue:                             [ ]Mild [ ]Moderate [ ]Severe  Nausea:                             [ ]Mild [ ]Moderate [ ]Severe  Loss of appetite:              [ ]Mild [ ]Moderate [ ]Severe  Constipation:                    [ ]Mild [ ]Moderate [ ]Severe    Other Symptoms:  [X]All other review of systems negative    Palliative Performance Status Version 2:         10%  [email or web address]://[email or web address]/[email or web address]_performance_scale_[email or web address]    PHYSICAL EXAM:   Vital Signs Last 24 Hrs  T(C): 36.5 (18 Jun 2022 07:56), Max: 36.5 (18 Jun 2022 07:56)  T(F): 97.7 (18 Jun 2022 07:56), Max: 97.7 (18 Jun 2022 07:56)  HR: 125 (18 Jun 2022 07:56) (125 - 125)  BP: 120/79 (18 Jun 2022 07:56) (120/79 - 120/79)  BP(mean): --  RR: 24 (18 Jun 2022 07:56) (24 - 24)  SpO2: 87% (18 Jun 2022 07:56) (87% - 87%) I&O's Summary    17 Jun 2022 07:01  -  18 Jun 2022 07:00  --------------------------------------------------------  IN: 0 mL / OUT: 150 mL / NET: -150 mL      GENERAL: [ ] Cachexia  [ ]Alert  [ ]Oriented x   [ ]Lethargic  [X]Unarousable  [ ]Verbal  [ ]Non-Verbal  Behavioral:   [ ] Anxiety  [ ] Delirium [ ] Agitation [ ] Other  HEENT:  [X]Normal   [ ]Dry mouth   [ ]ET Tube/Trach  [ ]Oral lesions  PULMONARY:   [X]Clear [ ]Tachypnea  [ ]Audible excessive secretions   [ ]Rhonchi        [ ]Right [ ]Left [ ]Bilateral  [ ]Crackles        [ ]Right [ ]Left [ ]Bilateral  [ ]Wheezing     [ ]Right [ ]Left [ ]Bilateral  [ ]Diminished BS [ ]Right [ ]Left [ ]Bilateral    CARDIOVASCULAR:    [ ]Regular [X]Irregular [X]Tachy  [ ]Jefry [ ]Murmur [ ]Other  GASTROINTESTINAL:  [X]Soft  [ ]Distended   [ ]+BS  [X]Non tender [ ]Tender  [ ]Other [ ]PEG [ ]OGT/ NGT   Last BM:  6/14  GENITOURINARY:  [ ]Normal [ ] Incontinent   [ ]Oliguria/Anuria   [X]Bradley  MUSCULOSKELETAL:   [ ]Normal   [ ]Weakness  [X]Bed/Wheelchair bound [ ]Edema  NEUROLOGIC:   [ ]No focal deficits  [X] Cognitive impairment  [ ] Dysphagia [ ]Dysarthria [ ] Paresis [ ]Other   SKIN:   [ ]Normal  [ ]Rash  [ ]Other  [X]Pressure ulcer(s)  [X]y [ ]n  Present on admission      CRITICAL CARE:  [ ] Shock Present  [ ]Septic [ ]Cardiogenic [ ]Neurologic [ ]Hypovolemic  [ ]  Vasopressors [ ]  Inotropes   [ ] Respiratory failure present [ ] Mechanical Ventilation [ ] Non-invasive ventilatory support [ ] High-Flow  [ ] Acute  [ ] Chronic [ ] Hypoxic  [ ] Hypercarbic [ ] Other  [ ] Other organ failure     LABS:      RADIOLOGY & ADDITIONAL STUDIES:    PROTEIN CALORIE MALNUTRITION: [ ] mild [ ] moderate [ ] severe  [ ] underweight [ ] morbid obesity    https://www.andeal.org/vault/2440/web/files/ONC/Table_Clinical%20Characteristics%20to%20Document%20Malnutrition-White%20JV%20et%20al%558703.pdf    Height (cm): 160 (06-10-22 @ 18:10), 160 (06-10-22 @ 16:36), 160 (01-15-22 @ 13:58)  Weight (kg): 58.3 (06-10-22 @ 18:21), 52.2 (06-10-22 @ 16:36), 56.2 (01-14-22 @ 01:16)  BMI (kg/m2): 22.8 (06-10-22 @ 18:21), 20.4 (06-10-22 @ 18:10), 20.4 (06-10-22 @ 16:36)    [X] PPSV2 < or = 30% [ ] significant weight loss [X] poor nutritional intake [ ] anasarca   Artificial Nutrition [ ]     REFERRALS:   [ ]Chaplaincy  [ ] Hospice  [ ]Child Life  [X]Social Work  [ ]Case management [ ]Holistic Therapy [ ] Physical Therapy [ ] Dietary     Goals of Care Document: D. Lloyd (06-12-22 @ 15:29)  Goals of Care Conversation:   Participants:  · Participants  Family  · Child(hannah)  Denis    Advance Directives:  · Does patient have Advance Directive  No  · Do you want to complete the HCP and name a [facility] Care Agent  No  · Does Patient Have a Surrogate  No  · Does the Patient have a Court Appointed Guardian ([address])  No  · Caregiver:  no    Conversation Discussion:  · Conversation  Prognosis; MOLST Discussed; Treatment Options  · Conversation Details  Ms. Arriola is a 89/F w/ dementia, bedbound, who has a hx of MCA infarct, AF, HTN, HLD, CAD and who was transferred from [facility] for left lower leg ischemia.  Pt seen by surgery.  The limb is not salvageable and will need amputation however the operation carries a high risk in this pt and in earlier discussion family opted for conservative management.  Further goals of care between son Denis, dtr-in-law Michelle, and me revealed that the family wants to focus on making the patient comfortable.  They know that although she is a fighter she has likely reached the end of her life and has implored me to "let her die in peace".  To that end, in shared decision-making, we will stop the heparin gtt, stop antibiotics, stop blood draws, and add medications to make the patient comfortable.  She was already DNR/DNI.  Denis understands that his mother will continue to fight, but may decide to pass away naturally.  Family's questions were answered, concerns addressed.  Primary team available if pt or family needs us.    What Matters Most To Patient and Family:  · What matters most to patient and family  Comfort, dignity    Personal Advance Directives Treatment Guidelines:   Treatment Guidelines:  · Decision Maker  Surrogate  · Treatment Guidelines  DNR Order; Comfort measures only; No blood draws; No artificial nutrition; No antibiotics    MOLST:  · Completed  11-Jun-2022    Location of Discussion:   Time Spent on Advance Care Planning:  I spent 30 (in minutes) on advance care planning services with the patient.  This time is separate and distinct from any other care management services provided on this date.    Location of Discussion:  · Location of discussion  Face to face      Electronic Signatures:  Chas Lloyd)  (Signed 12-Jun-2022 15:44)  	Authored: Goals of Care Conversation, Personal Advance Directives Treatment Guidelines, Location of Discussion      Last Updated: 12-Jun-2022 15:44 by Chas Lloyd)     GAP TEAM PALLIATIVE CARE UNIT PROGRESS NOTE:      [  ] Patient on hospice program.    INDICATION FOR PALLIATIVE CARE UNIT SERVICES/Interval HPI: Symptom-directed care    OVERNIGHT EVENTS: Used x1 PRN Dilaudid 0.2mg IV for moderate pain, x1 PRN 0.5mg IV for severe pain and x1 PRN 0.5mg IV for dyspnea.    DNR on chart: Yes  Yes      Allergies    No Known Allergies    Intolerances    MEDICATIONS  (STANDING):  HYDROmorphone  Injectable 0.5 milliGRAM(s) IV Push every 6 hours    MEDICATIONS  (PRN):  acetaminophen  Suppository .. 650 milliGRAM(s) Rectal every 6 hours PRN Temp greater or equal to 38C (100.4F), Mild Pain (1 - 3)  bisacodyl Suppository 10 milliGRAM(s) Rectal daily PRN Constipation  glycopyrrolate Injectable 0.4 milliGRAM(s) IV Push four times a day PRN Secretions  HYDROmorphone  Injectable 0.5 milliGRAM(s) IV Push every 1 hour PRN Severe Pain (7 - 10)  HYDROmorphone  Injectable 0.2 milliGRAM(s) IV Push every 1 hour PRN Moderate Pain (4 - 6)  HYDROmorphone  Injectable 0.5 milliGRAM(s) IV Push every 1 hour PRN dyspnea  LORazepam   Injectable 0.5 milliGRAM(s) IV Push every 1 hour PRN Anxiety    ITEMS UNCHECKED ARE NOT PRESENT    PRESENT SYMPTOMS: [X]Unable to self-report  [X]PAINADs [X]RDOS  Source if other than patient:  [ ]Family   [ ]Team     Pain: [ ] yes [X] no  QOL impact -   Location -                    Aggravating factors -  Quality -  Radiation -  Timing-  Severity (0-10 scale):  Minimal acceptable level (0-10 scale):     PAINAD Score: 0  http://geriatrictoolkit.[place].[facility]/cog/painad.pdf (Ctrl +  left click to view)    Dyspnea:                           [ ]Mild [ ]Moderate [ ]Severe    RDOS: 2  0 to 2  minimal or no respiratory distress   3  mild distress  4 to 6 moderate distress  >7 severe distress  https://[email or web address]/handouts/hen/Respiratory_Distress_Observation_Scale.pdf (Ctrl +  left click to view)    Anxiety:                             [ ]Mild [ ]Moderate [ ]Severe  Fatigue:                             [ ]Mild [ ]Moderate [ ]Severe  Nausea:                             [ ]Mild [ ]Moderate [ ]Severe  Loss of appetite:              [ ]Mild [ ]Moderate [ ]Severe  Constipation:                    [ ]Mild [ ]Moderate [ ]Severe    Other Symptoms:  [X]All other review of systems unable to obtain due to poor mentation    Palliative Performance Status Version 2:         10%  [email or web address]/files/[email or web address]_performance_scale_[email or web address].pdf    PHYSICAL EXAM:   Vital Signs Last 24 Hrs  T(C): 36.5 (18 Jun 2022 07:56), Max: 36.5 (18 Jun 2022 07:56)  T(F): 97.7 (18 Jun 2022 07:56), Max: 97.7 (18 Jun 2022 07:56)  HR: 125 (18 Jun 2022 07:56) (125 - 125)  BP: 120/79 (18 Jun 2022 07:56) (120/79 - 120/79)  BP(mean): --  RR: 24 (18 Jun 2022 07:56) (24 - 24)  SpO2: 87% (18 Jun 2022 07:56) (87% - 87%) I&O's Summary    17 Jun 2022 07:01  -  18 Jun 2022 07:00  --------------------------------------------------------  IN: 0 mL / OUT: 150 mL / NET: -150 mL      GENERAL: [ ] Cachexia  [ ]Alert  [ ]Oriented x   [ ]Lethargic  [X]Unarousable  [ ]Verbal  [ ]Non-Verbal  Behavioral:   [ ] Anxiety  [ ] Delirium [ ] Agitation [ ] Other  HEENT:  [X]Normal   [ ]Dry mouth   [ ]ET Tube/Trach  [ ]Oral lesions  PULMONARY:   [X]Clear [ ]Tachypnea  [ ]Audible excessive secretions   [ ]Rhonchi        [ ]Right [ ]Left [ ]Bilateral  [ ]Crackles        [ ]Right [ ]Left [ ]Bilateral  [ ]Wheezing     [ ]Right [ ]Left [ ]Bilateral  [ ]Diminished BS [ ]Right [ ]Left [ ]Bilateral    CARDIOVASCULAR:    [ ]Regular [X]Irregular [X]Tachy  [ ]Jefry [ ]Murmur [ ]Other  GASTROINTESTINAL:  [X]Soft  [ ]Distended   [ ]+BS  [X]Non tender [ ]Tender  [ ]Other [ ]PEG [ ]OGT/ NGT   Last BM:  6/14  GENITOURINARY:  [ ]Normal [ ] Incontinent   [ ]Oliguria/Anuria   [X]Bradley  MUSCULOSKELETAL:   [ ]Normal   [ ]Weakness  [X]Bed/Wheelchair bound [ ]Edema  NEUROLOGIC:   [ ]No focal deficits  [X] Cognitive impairment  [ ] Dysphagia [ ]Dysarthria [ ] Paresis [ ]Other   SKIN:   [ ]Normal  [ ]Rash  [ ]Other  [X]Pressure ulcer(s)  [X]y [ ]n  Present on admission      CRITICAL CARE:  [ ] Shock Present  [ ]Septic [ ]Cardiogenic [ ]Neurologic [ ]Hypovolemic  [ ]  Vasopressors [ ]  Inotropes   [ ] Respiratory failure present [ ] Mechanical Ventilation [ ] Non-invasive ventilatory support [ ] High-Flow  [ ] Acute  [ ] Chronic [ ] Hypoxic  [ ] Hypercarbic [ ] Other  [ ] Other organ failure     LABS: No new labs      RADIOLOGY & ADDITIONAL STUDIES: Prior imaging reviewed.    PROTEIN CALORIE MALNUTRITION: [ ] mild [ ] moderate [ ] severe  [ ] underweight [ ] morbid obesity    https://www.andeal.org/vault/2440/web/files/ONC/Table_Clinical%20Characteristics%20to%20Document%20Malnutrition-White%20JV%20et%20al%053203.pdf    Height (cm): 160 (06-10-22 @ 18:10), 160 (06-10-22 @ 16:36), 160 (01-15-22 @ 13:58)  Weight (kg): 58.3 (06-10-22 @ 18:21), 52.2 (06-10-22 @ 16:36), 56.2 (01-14-22 @ 01:16)  BMI (kg/m2): 22.8 (06-10-22 @ 18:21), 20.4 (06-10-22 @ 18:10), 20.4 (06-10-22 @ 16:36)    [X] PPSV2 < or = 30% [ ] significant weight loss [X] poor nutritional intake [ ] anasarca   Artificial Nutrition [ ]     REFERRALS:   [ ]Chaplaincy  [ ] Hospice  [ ]Child Life  [X]Social Work  [ ]Case management [ ]Holistic Therapy [ ] Physical Therapy [ ] Dietary     Goals of Care Document: JACKY Lloyd (06-12-22 @ 15:29)  Goals of Care Conversation:   Participants:  · Participants  Family  · Child(mayelin Barrios    Advance Directives:  · Does patient have Advance Directive  No  · Do you want to complete the HCP and name a [facility] Care Agent  No  · Does Patient Have a Surrogate  No  · Does the Patient have a Court Appointed Guardian ([address])  No  · Caregiver:  no    Conversation Discussion:  · Conversation  Prognosis; MOLST Discussed; Treatment Options  · Conversation Details  Ms. Arriola is a 89/F w/ dementia, bedbound, who has a hx of MCA infarct, AF, HTN, HLD, CAD and who was transferred from [facility] for left lower leg ischemia.  Pt seen by surgery.  The limb is not salvageable and will need amputation however the operation carries a high risk in this pt and in earlier discussion family opted for conservative management.  Further goals of care between son Denis, dtr-in-law Michelle, and me revealed that the family wants to focus on making the patient comfortable.  They know that although she is a fighter she has likely reached the end of her life and has implored me to "let her die in peace".  To that end, in shared decision-making, we will stop the heparin gtt, stop antibiotics, stop blood draws, and add medications to make the patient comfortable.  She was already DNR/DNI.  Denis understands that his mother will continue to fight, but may decide to pass away naturally.  Family's questions were answered, concerns addressed.  Primary team available if pt or family needs us.    What Matters Most To Patient and Family:  · What matters most to patient and family  Comfort, dignity    Personal Advance Directives Treatment Guidelines:   Treatment Guidelines:  · Decision Maker  Surrogate  · Treatment Guidelines  DNR Order; Comfort measures only; No blood draws; No artificial nutrition; No antibiotics    MOLST:  · Completed  11-Jun-2022    Location of Discussion:   Time Spent on Advance Care Planning:  I spent 30 (in minutes) on advance care planning services with the patient.  This time is separate and distinct from any other care management services provided on this date.    Location of Discussion:  · Location of discussion  Face to face      Electronic Signatures:  Chas Lloyd)  (Signed 12-Jun-2022 15:44)  	Authored: Goals of Care Conversation, Personal Advance Directives Treatment Guidelines, Location of Discussion      Last Updated: 12-Jun-2022 15:44 by Chas Lloyd)

## 2022-06-18 NOTE — PROGRESS NOTE ADULT - PROBLEM SELECTOR PLAN 7
Care continued in PCU. If stable Monday, will refer for inpatient hospice.    Indu Betancourt MD  Palliative Fellow, PGY5  Geriatrics and Palliative Consult Service

## 2022-06-18 NOTE — PROGRESS NOTE ADULT - PROBLEM SELECTOR PROBLEM 5
Encounter for palliative care
ROBERTO CARLOS (acute kidney injury)
Atrial fibrillation
ROBERTO CARLOS (acute kidney injury)
Delirium of mixed origin
Atrial fibrillation
Delirium of mixed origin
Atrial fibrillation
Atrial fibrillation

## 2022-06-18 NOTE — PROGRESS NOTE ADULT - REASON FOR ADMISSION
Ischemic limb

## 2022-06-18 NOTE — PROGRESS NOTE ADULT - PROBLEM SELECTOR PLAN 5
Multiple factors - serious illness, metabolic derangements, acute hospitalization.  Monitor for constipation, urinary retention, pain, hunger, thirst, etc.  Promote sleep wake cycle and reorientation as indicated.  - Ativan 0.5mg IV q1h PRN for agitation

## 2022-06-18 NOTE — PROGRESS NOTE ADULT - PROVIDER SPECIALTY LIST ADULT
Palliative Care
Palliative Care
Internal Medicine
Palliative Care
Internal Medicine
Palliative Care
Palliative Care

## 2022-06-18 NOTE — PROGRESS NOTE ADULT - ATTENDING COMMENTS
89 yo F bedbound w/ h/o CVA w/ old left MCA infarct, AFIB  HTN, HLD, CAD, dementia BIBEMS as transfer from [place] for left lower leg ischemia. Given multiple co-morbidities and poor functional status, family has opted for symptom directed management.    Pt requiring escalating doses of PRN IV dilaudid for pain managmnet. Will increase ATC dosing of IV dilaudid to 0.5mg q6h. Increase Dilaudid 0.5mg IV PRN q1h for moderate pain, 1mg IV q1h PRN for severe pain. Continue bowel regimen. Last BM 6/14, if no BM today will give suppository tomorrow.
89F bedbound w/ h/o CVA w/ old left MCA infarct, dementia and non-verbal, AFIB, HTN, HLD, CAD BIBEMS as transfer from [place] for left lower leg ischemia. Appreciate vascular surgery recommendations, non-salvageable leg with possible need for L AKA vs palliation.     Patient not alert, does not appear to be in pain. LLE foot/calf mottled, worsening.  Leukocytosis worsening.   Now DNR/DNI, MOLST in the chart. Family declines surgical intervention   Ongoing GOC  If comfort measures decided, palliative c/s appropriate for PCU consideration.   S/S eval noted, can resume PO meds  Ischemic limb - Continue broad spectrum abx, heparin gtt for medical management while family wants treatment  IV Dilaudid PRN for pain control   Chronic afib - home digoxin and IV metoprolol     Rest as above.
Awaiting PCU transfer.
Continue comfort measures. Possible PCU transfer.
89y/o F  bedbound w/ h/o CVA w/ old left MCA infarct, AFIB  HTN, HLD, CAD, dementia BIBEMS as transfer from [place] for left lower leg ischemia.  Given her overall medical condition, surgical management was not offered.  Course c/b ROBERTO CARLOS and UTI.  Patient in the PCU for management of pain and other symptoms due to limb ischemia.  Hospice transition to be addressed if clinical condition permits. In the setting of parenteral controlled substance administration, clinical monitoring required for side effects such as respiratory depression, constipation and opioid induced neurotoxicity.  This patient is at high risk due to organ failure.  Patient assessment and plan discussed on interdisciplinary team rounds today.
89F bedbound w/ h/o CVA w/ old left MCA infarct, dementia and non-verbal, AFIB, HTN, HLD, CAD BIBEMS as transfer from [place] for left lower leg ischemia. Appreciate vascular surgery recommendations, non-salvageable leg with possible need for L AKA vs palliation.     Patient not alert, does not appear to be in pain. LLE foot/calf mottled.   Appropriate for hospice - per vascular note family declined surgical intervention. Team attempting to reach family to discuss GOC.  Continue broad spectrum abx, heparin gtt  IV Dilaudid PRN for pain control   Afib RVR - can resume home digoxin, continue IV metoprolol q6    Rest as above.
89y/o F  bedbound w/ h/o CVA w/ old left MCA infarct, AFIB  HTN, HLD, CAD, dementia BIBEMS as transfer from [place] for left lower leg ischemia.  Given her overall medical condition, surgical management was not offered.  Course c/b ROBERTO CARLOS and UTI.  Patient in the PCU for management of pain and other symptoms due to limb ischemia.  Hospice transition to be addressed if clinical condition permits. In the setting of parenteral controlled substance administration, clinical monitoring required for side effects such as respiratory depression, constipation and opioid induced neurotoxicity.  This patient is at high risk due to organ failure.  Patient assessment and plan discussed on interdisciplinary team rounds today.

## 2022-06-18 NOTE — PROGRESS NOTE ADULT - PROBLEM SELECTOR PROBLEM 1
Extremity cyanosis
Pain
Extremity cyanosis
Extremity cyanosis
Other encephalopathy
Pain

## 2022-06-18 NOTE — PROGRESS NOTE ADULT - PROBLEM SELECTOR PLAN 2
pt requiring full supportive care with all ADL's.
PRN hydromorphone available, none required past 24 hours.  On same RTC for pain/dyspnea.    Bowel regimen ordered, monitor for constipation.
Used x1 PRN Dilaudid 0.5mg IV for dyspnea in 24 hours.  - Dilaudid ATC to 0.5 mg IV q6h  - Increase Dilaudid to 1mg IV PRN q1h for dyspnea  - Bowel regimen ordered, monitor for constipation.
Patient unable to communicate symptoms.   - 06/10: UA +LE, +Bacteria, +WBCs, given zosyn and vanc in ED, 1L given   - 06/11: 500cc given     Plan:   - Pt started on cefepime and vancomycin. Now d/c-ed after family discussion  - Blood  cultured NGTD and urine cultures: E.Coli   - Trend fever curves
Patient unable to communicate symptoms.   - 06/10: UA +LE, +Bacteria, +WBCs  - Pt started on cefepime and vancomycin. Now d/c-ed after family discussion  - Blood  cultured NGTD and urine cultures: E.Coli   - Goal is comfort care  Pt is afebrile.
Patient unable to communicate symptoms.   - 06/10: UA +LE, +Bacteria, +WBCs, given zosyn and vanc in ED, 1L given   - 06/11: 500cc given     Plan:   - Start cefepime and vancomycin   - F/u Blood and urine cultures   - Trend fever curves   - Leukocytosis noted, trend CBC w/ diff   - Will give fluid as needed
Patient unable to communicate symptoms.   - 06/10: UA +LE, +Bacteria, +WBCs, given zosyn and vanc in ED, 1L given   - 06/11: 500cc given     Plan:   - Pt started on cefepime and vancomycin. Now d/c-ed after family discussion  - Blood  cultured NGTD and urine cultures: E.Coli   - Trend fever curves
Patient unable to communicate symptoms.   - 06/10: UA +LE, +Bacteria, +WBCs  - Pt started on cefepime and vancomycin. Now d/c-ed after family discussion  - Blood  cultured NGTD and urine cultures: E.Coli   - Goal is comfort care  Pt remains afebrile.
Patient unable to communicate symptoms.   - 06/10: UA +LE, +Bacteria, +WBCs, given zosyn and vanc in ED, 1L given   - 06/11: 500cc given     Plan:   - Start cefepime and vancomycin   - Blood  cultured NGTD and urine cultures: E.Coli   - Trend fever curves   - Leukocytosis noted, trend CBC w/ diff   - Will give albumin as needed

## 2022-06-18 NOTE — PROGRESS NOTE ADULT - PROBLEM SELECTOR PROBLEM 6
Functional quadriplegia
Functional quadriplegia
Other encephalopathy
Other encephalopathy
Dementia
Advanced care planning/counseling discussion
Dementia

## 2022-06-18 NOTE — PROGRESS NOTE ADULT - PROBLEM SELECTOR PROBLEM 3
Lower limb ischemia
Elevated troponin
Atrial fibrillation
Atrial fibrillation
Extremity cyanosis
Elevated troponin
Lower limb ischemia
Elevated troponin
Elevated troponin

## 2022-06-18 NOTE — PROGRESS NOTE ADULT - PROBLEM SELECTOR PROBLEM 7
Hypertension
Hypertension
Encounter for palliative care
Functional quadriplegia
Encounter for palliative care
Functional quadriplegia
Hypertension
Hypertension

## 2022-06-18 NOTE — PROGRESS NOTE ADULT - PROBLEM SELECTOR PROBLEM 4
ROBERTO CARLOS (acute kidney injury)
Dementia
Dementia
Pain
Dementia
ROBERTO CARLOS (acute kidney injury)
ROBERTO CARLOS (acute kidney injury)
Dementia
ROBERTO CARLOS (acute kidney injury)

## 2022-06-18 NOTE — PROGRESS NOTE ADULT - PROBLEM SELECTOR PLAN 1
Pain likely due to ischemic limb. Used x1 PRN Dilaudid 0.2mg IV for moderate pain and x1 PRN 0.5mg IV for severe pain.  - Increase Dilaudid ATC to 0.5 mg IV q6h  - Increase Dilaudid 0.5mg IV PRN q1h for moderate pain, 1mg IV q1h PRN for severe pain  - Bowel regimen ordered, monitor for constipation.

## 2022-06-18 NOTE — PROGRESS NOTE ADULT - ASSESSMENT
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 Given multiple co-morbidities and poor functional status, family has opted for symptom directed management. I have reviewed and confirmed nurses' notes for patient's medications, allergies, medical history, and surgical history.

## 2022-06-19 NOTE — DISCHARGE NOTE FOR THE EXPIRED PATIENT - HOSPITAL COURSE
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 Vascular surgery evaluated patient and deemed not a surgical candidate. Given multiple co-morbidities and poor functional status, family opted for symptom directed management after goals of care discussion with Palliative Care team. Patient transferred to Palliative Care Unit where she  on 22

## 2022-12-05 NOTE — ED ADULT NURSE NOTE - NS ED NOTE ABUSE RESPONSE YN
December 5, 2022     Patient: Diego Goyal   YOB: 2013   Date of Visit: 12/5/2022       To Whom it May Concern:    Diego Goyal was seen in my clinic on 12/5/2022  She may return to school on 12/07/2022  If you have any questions or concerns, please don't hesitate to call           Sincerely,          Florida Espinoza MD        CC: No Recipients
Unable to assess due to medical condition

## 2023-01-18 NOTE — ED ADULT NURSE NOTE - CHEST APPEARANCE
Gastrojejunostomy (GJ) Tube Discharge Instructions:   You had a gastrojejunostomy (stomach-intestinal) also known as GJ tube placed on ***. This tube is often used for nutritional support, medication administration and/or stomach venting.     Care instructions:  - If you received sedation for your procedure, do not drive or operate heavy machinery for the rest of the day.  - Avoid soaking in stagnant water (tub baths, Jacuzzis, pools, lake, or ocean).   - You may shower beginning the day after the tube was placed.   - Clean under the disc daily with soap and water. Pat dry under disc and apply new split gauze dressing under disc. Cleaning tube site daily will help prevent infection and skin irritation.  - A small amount of clear tan drainage from the tube exit site can be normal.  - Make sure the disc on the tube fits slightly snug against the skin so that the tube does not move in or out of the body easily.  - Flush your tube with 60cc of water twice a day (using a cath tip syringe) to prevent tube from clogging (or follow recommendations from your dietician if given).    Giving Feedings and Medications:  - Follow-up with your dietician, primary care provider, or oncologist for instructions on tube feedings and medication administration.    - ONLY use specific enteric feedings with your GJ tube (unless otherwise discussed with your dietitian).    - Flush tube at least twice daily with 60ml of water using cath tip syringe or follow dietician's instructions if given.  - Flush the tube before and after administrating medications and bolus feedings with 60cc of water.  - Note: Most jejunal (J port) feedings are given by a continuous method. A continuous feeding is given with a pump over a period of time, usually 12 to 24 hours based upon your specific situation.    Follow Up:  - Routine 3 month exchanges of feeding tube is recommended. Please contact [facility] at [phone]*** to arrange a GJ exchange  appointment.  - GJ tubes CANNOT be removed until 6 weeks after date of tube placement (Tube placed ***).  - T-fasteners/Buttons (suture) should be removed 7-10 days after tube placement. This may have been done while you were still and inpatient, or can be completed in your outpatient clinic or care facility. Please contact [facility] for T fastener questions or concerns at [phone]***.    Please seek medical evaluation for:  - Fever (greater than 101 F (38.3C)).  - Purulent (yellow/green/foul smelling) drainage from tube exit site.   - Significant or worsening abdominal pain.   - Skin that is hot to the touch or significantly reddened at the tube exit site.   - Bleeding at tube exit site.    Call [facility] at [phone]*** with questions or if you have any of the following symptoms:  - Tube falls out or felt to be out of position.  - Unable to flush tube.  - Significant leakage around tube site (tube feeding, medications or drainage).  - Significant bleeding at the tube exit site.  - Severe pain at tube exit site.    normal

## 2023-03-27 NOTE — DISCHARGE NOTE PROVIDER - NSDCMRMEDTOKEN_GEN_ALL_CORE_FT
amLODIPine 5 mg oral tablet: 1 tab(s) orally once a day  apixaban 2.5 mg oral tablet: 1 tab(s) orally once a day  ascorbic acid 500 mg oral tablet: 1 tab(s) orally 2 times a day  aspirin 81 mg oral tablet, chewable: 1 tab(s) orally once a day  atorvastatin 40 mg oral tablet: 1 tab(s) orally once a day (at bedtime)  cyanocobalamin 1000 mcg oral tablet: 1 tab(s) orally once a day  digoxin 125 mcg (0.125 mg) oral tablet: 1 tab(s) orally once a day  dilTIAZem 360 mg/24 hours oral capsule, extended release: 1 cap(s) orally once a day  escitalopram 5 mg oral tablet: 1 tab(s) orally once a day  folic acid 1 mg oral tablet: 1 tab(s) orally once a day  metoprolol tartrate 25 mg oral tablet: 1 tab(s) orally 2 times a day  Multiple Vitamins oral tablet: 1 tab(s) orally once a day  polyethylene glycol 3350 oral powder for reconstitution: 17 gram(s) orally once a day, As needed, Constipation   [Cervical Pap Smear] : cervical Pap smear [Liquid Base] : liquid base [Tolerated Well] : the patient tolerated the procedure well [No Complications] : there were no complications

## 2023-11-02 NOTE — ED ADULT TRIAGE NOTE - HEIGHT IN CM
To radiology via stretcher. Patient asking for pain and nausea medicine. Dr. Savanna Friedman notified.      Bonnie Sandoval RN  11/02/23 2700 160.02

## 2023-11-16 NOTE — CONSULT NOTE ADULT - CONSULT REQUESTED DATE/TIME
04-Jan-2021 18:39
03-Jan-2021 13:01
03-Jan-2021 18:35
Rifampin Counseling: I discussed with the patient the risks of rifampin including but not limited to liver damage, kidney damage, red-orange body fluids, nausea/vomiting and severe allergy.

## 2024-04-24 NOTE — OCCUPATIONAL THERAPY INITIAL EVALUATION ADULT - STRENGTHENING, PT EVAL
No
Pt will increased BUE/LE muscle strength by 1 full grade to enable pt to assist with functional mobility and self care tasks within 12 weeks

## 2024-07-15 NOTE — H&P ADULT - PROBLEM/PLAN-7
Detail Level: Detailed Patient Specific Counseling (Will Not Stick From Patient To Patient): =\\nAF notes that it is not ring worm\\nAF counsels about shampoo and how it can act as a preventative for the yeast to continue to grow DISPLAY PLAN FREE TEXT

## 2024-11-02 NOTE — ED PROVIDER NOTE - CARE PLAN
Goal Outcome Evaluation:           Progress: improving    Patient is Alert and oriented x 4. Vital signs are stable. Patient is tolerating liquids well. Patient has ambulated around the unit multiple times today. Plan of care ongoing.                                 Principal Discharge DX:	Limb ischemia   1